# Patient Record
Sex: MALE | Race: WHITE | NOT HISPANIC OR LATINO | Employment: OTHER | URBAN - NONMETROPOLITAN AREA
[De-identification: names, ages, dates, MRNs, and addresses within clinical notes are randomized per-mention and may not be internally consistent; named-entity substitution may affect disease eponyms.]

---

## 2018-11-16 ENCOUNTER — OFFICE VISIT (OUTPATIENT)
Dept: SURGERY | Facility: CLINIC | Age: 63
End: 2018-11-16

## 2018-11-16 VITALS
WEIGHT: 185 LBS | SYSTOLIC BLOOD PRESSURE: 140 MMHG | TEMPERATURE: 98.9 F | OXYGEN SATURATION: 99 % | BODY MASS INDEX: 25.9 KG/M2 | HEIGHT: 71 IN | HEART RATE: 67 BPM | DIASTOLIC BLOOD PRESSURE: 80 MMHG

## 2018-11-16 DIAGNOSIS — Z12.11 SCREENING FOR COLON CANCER: ICD-10-CM

## 2018-11-16 DIAGNOSIS — K86.1 CHRONIC PANCREATITIS, UNSPECIFIED PANCREATITIS TYPE (HCC): Primary | ICD-10-CM

## 2018-11-16 DIAGNOSIS — R10.11 RIGHT UPPER QUADRANT ABDOMINAL PAIN: ICD-10-CM

## 2018-11-16 PROCEDURE — 99204 OFFICE O/P NEW MOD 45 MIN: CPT | Performed by: SURGERY

## 2018-11-16 RX ORDER — TIZANIDINE 4 MG/1
4 TABLET ORAL EVERY 8 HOURS PRN
COMMUNITY
Start: 2018-11-01 | End: 2021-07-15 | Stop reason: HOSPADM

## 2018-11-16 RX ORDER — FLUTICASONE PROPIONATE 50 MCG
1 SPRAY, SUSPENSION (ML) NASAL DAILY PRN
COMMUNITY
Start: 2018-10-17

## 2018-11-16 RX ORDER — GABAPENTIN 600 MG/1
600 TABLET ORAL 3 TIMES DAILY
COMMUNITY
Start: 2018-10-16 | End: 2021-07-15 | Stop reason: HOSPADM

## 2018-11-16 RX ORDER — POLYETHYLENE GLYCOL 3350 17 G/17G
255 POWDER, FOR SOLUTION ORAL ONCE
Qty: 15 PACKET | Refills: 0 | Status: SHIPPED | OUTPATIENT
Start: 2018-11-16 | End: 2018-11-16

## 2018-11-16 RX ORDER — LISINOPRIL AND HYDROCHLOROTHIAZIDE 25; 20 MG/1; MG/1
1 TABLET ORAL DAILY
Status: ON HOLD | COMMUNITY
End: 2021-07-02

## 2018-11-16 RX ORDER — BISACODYL 5 MG/1
TABLET, DELAYED RELEASE ORAL
Qty: 4 TABLET | Refills: 0 | Status: ON HOLD | OUTPATIENT
Start: 2018-11-16 | End: 2021-07-02

## 2018-11-16 RX ORDER — CETIRIZINE HYDROCHLORIDE 10 MG/1
10 TABLET ORAL DAILY
Status: ON HOLD | COMMUNITY
End: 2021-07-02

## 2018-11-16 RX ORDER — HUMAN INSULIN 100 [IU]/ML
28-30 INJECTION, SUSPENSION SUBCUTANEOUS TAKE AS DIRECTED
Status: ON HOLD | COMMUNITY
Start: 2018-10-17 | End: 2021-07-02

## 2018-11-16 NOTE — PROGRESS NOTES
Patient: Chance Lopez    YOB: 1955    Date: 11/16/2018    Primary Care Provider: Ramos Gurrola MD    Chief Complaint   Patient presents with   • Abdominal Pain       Subjective .     History of present illness:  Patient is here and complains of recent upper abdominal pain, that comes and goes for the last six weeks. He also complains of weight loss and diarrhea. Patient state a history of Pancreatitis. He denies any recent lab work.  Patient has a history of alcohol abuse but he quit 14 years ago.  His last severe attack of pancreatitis with over 20 years ago, required hospitalization at .  Patient complains of right upper quadrant pain, associated with food, bloating and pressure.  No significant heartburn symptoms.  There is some fatty food intolerance.  No previous colonoscopy and no family history of colon cancer.    The following portions of the patient's history were reviewed and updated as appropriate: allergies, current medications, past family history, past medical history, past social history, past surgical history and problem list.       Review of Systems   Constitutional: Negative for chills, fever and unexpected weight change.   HENT: Negative for trouble swallowing and voice change.    Eyes: Negative for visual disturbance.   Respiratory: Negative for apnea, cough, chest tightness, shortness of breath and wheezing.    Cardiovascular: Negative for chest pain, palpitations and leg swelling.   Gastrointestinal: Positive for abdominal pain and diarrhea. Negative for abdominal distention, anal bleeding, blood in stool, constipation, nausea, rectal pain and vomiting.   Endocrine: Negative for cold intolerance and heat intolerance.   Genitourinary: Negative for difficulty urinating, dysuria, flank pain, scrotal swelling and testicular pain.   Musculoskeletal: Negative for back pain, gait problem and joint swelling.   Skin: Negative for color change, rash and wound.   Neurological: Negative  "for dizziness, syncope, speech difficulty, weakness, numbness and headaches.   Hematological: Negative for adenopathy. Does not bruise/bleed easily.   Psychiatric/Behavioral: Negative for confusion. The patient is not nervous/anxious.        Allergies:  No Known Allergies    Medications:    Current Outpatient Medications:   •  cetirizine (zyrTEC) 10 MG tablet, Take 10 mg by mouth Daily., Disp: , Rfl:   •  lisinopril-hydrochlorothiazide (PRINZIDE,ZESTORETIC) 20-25 MG per tablet, Take 1 tablet by mouth Daily., Disp: , Rfl:   •  fluticasone (FLONASE) 50 MCG/ACT nasal spray, , Disp: , Rfl:   •  gabapentin (NEURONTIN) 600 MG tablet, , Disp: , Rfl:   •  NOVOLIN 70/30 RELION (70-30) 100 UNIT/ML injection, , Disp: , Rfl:   •  tiZANidine (ZANAFLEX) 4 MG tablet, , Disp: , Rfl:     History\"  Past Medical History:   Diagnosis Date   • Diabetes mellitus (CMS/[facility])        Past Surgical History:   Procedure Laterality Date   • NO PAST SURGERIES         Family History   Problem Relation Age of Onset   • Diabetes Mother    • Diabetes Father        Social History     Tobacco Use   • Smoking status: Never Smoker   • Smokeless tobacco: Current User     Types: Chew   Substance Use Topics   • Alcohol use: No     Frequency: Never   • Drug use: No        Objective     Vital Signs:   Vitals:    11/16/18 1303   BP: 140/80   Pulse: 67   Temp: 98.9 °F (37.2 °C)   SpO2: 99%   Weight: 83.9 kg (185 lb)   Height: 180.3 cm (71\")       Physical Exam:   General Appearance:    Alert, cooperative, in no acute distress   Head:    Normocephalic, without obvious abnormality, atraumatic   Eyes:            Lids and lashes normal, conjunctivae and sclerae normal, no   icterus, no pallor, corneas clear, PERRLA   Ears:    Ears appear intact with no abnormalities noted   Throat:   No oral lesions, no thrush, oral mucosa moist   Neck:   No adenopathy, supple, trachea midline, no thyromegaly, no   carotid bruit, no JVD   Lungs:     Clear to " auscultation,respirations regular, even and                  unlabored    Heart:    Regular rhythm and normal rate, normal S1 and S2, no            murmur, no gallop, no rub, no click   Chest Wall:    No abnormalities observed   Abdomen:     Normal bowel sounds, no masses, no organomegaly, soft        non-tender, non-distended, no guarding, tender right upper quadrant    Extremities:   Moves all extremities well, no edema, no cyanosis, no             redness   Pulses:   Pulses palpable and equal bilaterally   Skin:   No bleeding, bruising or rash   Lymph nodes:   No palpable adenopathy   Neurologic:   Cranial nerves 2 - 12 grossly intact, sensation intact, DTR       present and equal bilaterally     Results Review:   I reviewed the patient's new clinical results.    Assessment/Plan     1. Chronic pancreatitis, unspecified pancreatitis type (CMS/HCC)    2. Screening for colon cancer    3. Right upper quadrant abdominal pain        Gallbladder ultrasound today.  Patient will need EGD and colonoscopy to evaluate possible ulcer disease or gastritis.  Also will need routine colon screening.  If gallbladder ultrasound negative, patient will also need a HIDA scan to rule out gallbladder disease.    I discussed the patients findings and my recommendations with patient    Review of Systems was reviewed and confirmed as accurate today.    Electronically signed by Ha Sykes MD  11/16/18      .

## 2018-11-16 NOTE — H&P (VIEW-ONLY)
Patient: Chance Lopez    YOB: 1955    Date: 11/16/2018    Primary Care Provider: Ramos Gurrola MD    Chief Complaint   Patient presents with   • Abdominal Pain       Subjective .     History of present illness:  Patient is here and complains of recent upper abdominal pain, that comes and goes for the last six weeks. He also complains of weight loss and diarrhea. Patient state a history of Pancreatitis. He denies any recent lab work.  Patient has a history of alcohol abuse but he quit 14 years ago.  His last severe attack of pancreatitis with over 20 years ago, required hospitalization at .  Patient complains of right upper quadrant pain, associated with food, bloating and pressure.  No significant heartburn symptoms.  There is some fatty food intolerance.  No previous colonoscopy and no family history of colon cancer.    The following portions of the patient's history were reviewed and updated as appropriate: allergies, current medications, past family history, past medical history, past social history, past surgical history and problem list.       Review of Systems   Constitutional: Negative for chills, fever and unexpected weight change.   HENT: Negative for trouble swallowing and voice change.    Eyes: Negative for visual disturbance.   Respiratory: Negative for apnea, cough, chest tightness, shortness of breath and wheezing.    Cardiovascular: Negative for chest pain, palpitations and leg swelling.   Gastrointestinal: Positive for abdominal pain and diarrhea. Negative for abdominal distention, anal bleeding, blood in stool, constipation, nausea, rectal pain and vomiting.   Endocrine: Negative for cold intolerance and heat intolerance.   Genitourinary: Negative for difficulty urinating, dysuria, flank pain, scrotal swelling and testicular pain.   Musculoskeletal: Negative for back pain, gait problem and joint swelling.   Skin: Negative for color change, rash and wound.   Neurological: Negative  "for dizziness, syncope, speech difficulty, weakness, numbness and headaches.   Hematological: Negative for adenopathy. Does not bruise/bleed easily.   Psychiatric/Behavioral: Negative for confusion. The patient is not nervous/anxious.        Allergies:  No Known Allergies    Medications:    Current Outpatient Medications:   •  cetirizine (zyrTEC) 10 MG tablet, Take 10 mg by mouth Daily., Disp: , Rfl:   •  lisinopril-hydrochlorothiazide (PRINZIDE,ZESTORETIC) 20-25 MG per tablet, Take 1 tablet by mouth Daily., Disp: , Rfl:   •  fluticasone (FLONASE) 50 MCG/ACT nasal spray, , Disp: , Rfl:   •  gabapentin (NEURONTIN) 600 MG tablet, , Disp: , Rfl:   •  NOVOLIN 70/30 RELION (70-30) 100 UNIT/ML injection, , Disp: , Rfl:   •  tiZANidine (ZANAFLEX) 4 MG tablet, , Disp: , Rfl:     History\"  Past Medical History:   Diagnosis Date   • Diabetes mellitus (CMS/[facility])        Past Surgical History:   Procedure Laterality Date   • NO PAST SURGERIES         Family History   Problem Relation Age of Onset   • Diabetes Mother    • Diabetes Father        Social History     Tobacco Use   • Smoking status: Never Smoker   • Smokeless tobacco: Current User     Types: Chew   Substance Use Topics   • Alcohol use: No     Frequency: Never   • Drug use: No        Objective     Vital Signs:   Vitals:    11/16/18 1303   BP: 140/80   Pulse: 67   Temp: 98.9 °F (37.2 °C)   SpO2: 99%   Weight: 83.9 kg (185 lb)   Height: 180.3 cm (71\")       Physical Exam:   General Appearance:    Alert, cooperative, in no acute distress   Head:    Normocephalic, without obvious abnormality, atraumatic   Eyes:            Lids and lashes normal, conjunctivae and sclerae normal, no   icterus, no pallor, corneas clear, PERRLA   Ears:    Ears appear intact with no abnormalities noted   Throat:   No oral lesions, no thrush, oral mucosa moist   Neck:   No adenopathy, supple, trachea midline, no thyromegaly, no   carotid bruit, no JVD   Lungs:     Clear to " auscultation,respirations regular, even and                  unlabored    Heart:    Regular rhythm and normal rate, normal S1 and S2, no            murmur, no gallop, no rub, no click   Chest Wall:    No abnormalities observed   Abdomen:     Normal bowel sounds, no masses, no organomegaly, soft        non-tender, non-distended, no guarding, tender right upper quadrant    Extremities:   Moves all extremities well, no edema, no cyanosis, no             redness   Pulses:   Pulses palpable and equal bilaterally   Skin:   No bleeding, bruising or rash   Lymph nodes:   No palpable adenopathy   Neurologic:   Cranial nerves 2 - 12 grossly intact, sensation intact, DTR       present and equal bilaterally     Results Review:   I reviewed the patient's new clinical results.    Assessment/Plan     1. Chronic pancreatitis, unspecified pancreatitis type (CMS/HCC)    2. Screening for colon cancer    3. Right upper quadrant abdominal pain        Gallbladder ultrasound today.  Patient will need EGD and colonoscopy to evaluate possible ulcer disease or gastritis.  Also will need routine colon screening.  If gallbladder ultrasound negative, patient will also need a HIDA scan to rule out gallbladder disease.    I discussed the patients findings and my recommendations with patient    Review of Systems was reviewed and confirmed as accurate today.    Electronically signed by Ha Sykes MD  11/16/18      .

## 2018-11-20 ENCOUNTER — PREP FOR SURGERY (OUTPATIENT)
Dept: OTHER | Facility: HOSPITAL | Age: 63
End: 2018-11-20

## 2018-11-20 DIAGNOSIS — Z12.11 COLON CANCER SCREENING: Primary | ICD-10-CM

## 2018-11-20 DIAGNOSIS — K29.70 GASTRITIS, PRESENCE OF BLEEDING UNSPECIFIED, UNSPECIFIED CHRONICITY, UNSPECIFIED GASTRITIS TYPE: ICD-10-CM

## 2018-11-21 ENCOUNTER — TELEPHONE (OUTPATIENT)
Dept: SURGERY | Facility: CLINIC | Age: 63
End: 2018-11-21

## 2018-11-26 ENCOUNTER — ANESTHESIA EVENT (OUTPATIENT)
Dept: GASTROENTEROLOGY | Facility: HOSPITAL | Age: 63
End: 2018-11-26

## 2018-11-26 ENCOUNTER — ANESTHESIA (OUTPATIENT)
Dept: GASTROENTEROLOGY | Facility: HOSPITAL | Age: 63
End: 2018-11-26

## 2018-11-26 ENCOUNTER — HOSPITAL ENCOUNTER (OUTPATIENT)
Facility: HOSPITAL | Age: 63
Setting detail: HOSPITAL OUTPATIENT SURGERY
Discharge: HOME | End: 2018-11-26
Attending: SURGERY | Admitting: SURGERY

## 2018-11-26 VITALS
RESPIRATION RATE: 18 BRPM | OXYGEN SATURATION: 96 % | DIASTOLIC BLOOD PRESSURE: 76 MMHG | HEART RATE: 55 BPM | BODY MASS INDEX: 25.9 KG/M2 | SYSTOLIC BLOOD PRESSURE: 155 MMHG | TEMPERATURE: 98.4 F | WEIGHT: 185 LBS | HEIGHT: 71 IN

## 2018-11-26 DIAGNOSIS — K29.70 GASTRITIS, PRESENCE OF BLEEDING UNSPECIFIED, UNSPECIFIED CHRONICITY, UNSPECIFIED GASTRITIS TYPE: ICD-10-CM

## 2018-11-26 DIAGNOSIS — Z12.11 COLON CANCER SCREENING: ICD-10-CM

## 2018-11-26 LAB — GLUCOSE BLDC GLUCOMTR-MCNC: 276 MG/DL (ref 70–130)

## 2018-11-26 PROCEDURE — 82962 GLUCOSE BLOOD TEST: CPT

## 2018-11-26 PROCEDURE — 25010000002 ONDANSETRON PER 1 MG: Performed by: NURSE ANESTHETIST, CERTIFIED REGISTERED

## 2018-11-26 PROCEDURE — 25010000002 PROPOFOL 200 MG/20ML EMULSION: Performed by: NURSE ANESTHETIST, CERTIFIED REGISTERED

## 2018-11-26 RX ORDER — ONDANSETRON 2 MG/ML
INJECTION INTRAMUSCULAR; INTRAVENOUS AS NEEDED
Status: DISCONTINUED | OUTPATIENT
Start: 2018-11-26 | End: 2018-11-26 | Stop reason: SURG

## 2018-11-26 RX ORDER — PROPOFOL 10 MG/ML
INJECTION, EMULSION INTRAVENOUS AS NEEDED
Status: DISCONTINUED | OUTPATIENT
Start: 2018-11-26 | End: 2018-11-26 | Stop reason: SURG

## 2018-11-26 RX ORDER — SODIUM CHLORIDE, SODIUM LACTATE, POTASSIUM CHLORIDE, CALCIUM CHLORIDE 600; 310; 30; 20 MG/100ML; MG/100ML; MG/100ML; MG/100ML
1000 INJECTION, SOLUTION INTRAVENOUS CONTINUOUS
Status: DISCONTINUED | OUTPATIENT
Start: 2018-11-26 | End: 2018-11-26 | Stop reason: HOSPADM

## 2018-11-26 RX ORDER — ONDANSETRON 2 MG/ML
4 INJECTION INTRAMUSCULAR; INTRAVENOUS ONCE AS NEEDED
Status: DISCONTINUED | OUTPATIENT
Start: 2018-11-26 | End: 2018-11-26 | Stop reason: HOSPADM

## 2018-11-26 RX ADMIN — PROPOFOL 50 MG: 10 INJECTION, EMULSION INTRAVENOUS at 12:10

## 2018-11-26 RX ADMIN — PROPOFOL 50 MG: 10 INJECTION, EMULSION INTRAVENOUS at 12:00

## 2018-11-26 RX ADMIN — PROPOFOL 50 MG: 10 INJECTION, EMULSION INTRAVENOUS at 12:02

## 2018-11-26 RX ADMIN — PROPOFOL 50 MG: 10 INJECTION, EMULSION INTRAVENOUS at 12:03

## 2018-11-26 RX ADMIN — SODIUM CHLORIDE, POTASSIUM CHLORIDE, SODIUM LACTATE AND CALCIUM CHLORIDE 1000 ML: 600; 310; 30; 20 INJECTION, SOLUTION INTRAVENOUS at 10:04

## 2018-11-26 RX ADMIN — PROPOFOL 50 MG: 10 INJECTION, EMULSION INTRAVENOUS at 12:07

## 2018-11-26 RX ADMIN — ONDANSETRON 4 MG: 2 INJECTION INTRAMUSCULAR; INTRAVENOUS at 12:02

## 2018-11-26 RX ADMIN — PROPOFOL 50 MG: 10 INJECTION, EMULSION INTRAVENOUS at 11:56

## 2018-11-26 RX ADMIN — PROPOFOL 50 MG: 10 INJECTION, EMULSION INTRAVENOUS at 11:58

## 2018-11-26 NOTE — ANESTHESIA PREPROCEDURE EVALUATION
Anesthesia Evaluation     Patient summary reviewed and Nursing notes reviewed   no history of anesthetic complications:  NPO Solid Status: > 8 hours  NPO Liquid Status: > 8 hours           Airway   Dental      Pulmonary    (-) not a smoker  Cardiovascular     (+) hypertension,       Neuro/Psych  GI/Hepatic/Renal/Endo    (+)   hepatitis A, liver disease, diabetes mellitus type 2 poorly controlled using insulin,     Musculoskeletal     (+) arthralgias, back pain, chronic pain,   Abdominal    Substance History   (+) alcohol use,      OB/GYN          Other                      Anesthesia Plan    ASA 3     MAC   (Risks and benefits discussed including risk of aspiration, recall and dental damage. All patient questions answered. Will continue with POC.)  intravenous induction   Anesthetic plan, all risks, benefits, and alternatives have been provided, discussed and informed consent has been obtained with: patient.

## 2018-11-26 NOTE — DISCHARGE INSTRUCTIONS
Please follow all post op instructions and follow up appointment time from your physician's office included in your discharge packet.    No pushing, pulling, tugging,  heavy lifting, or strenuous activity.  No major decision making, driving, or drinking alcoholic beverages for 24 hours. ( due to the medications you have  received)  Always use good hand hygiene/washing techniques.  NO driving while taking pain medications.    To assist you in voiding:  Drink plenty of fluids  Listen to running water while attempting to void.    If you are unable to urinate and you have an uncomfortable urge to void or it has been   6 hours since you were discharged, return to the Emergency Room

## 2018-11-26 NOTE — ANESTHESIA POSTPROCEDURE EVALUATION
Patient: Chance Lopez    Procedure Summary     Date:  11/26/18 Room / Location:  [facility] ENDOSCOPY 3 / [facility] ENDOSCOPY    Anesthesia Start:  1154 Anesthesia Stop:  1214    Procedures:       ESOPHAGOGASTRODUODENOSCOPY with cold forcep biopsy (N/A Esophagus)      COLONOSCOPY (N/A ) Diagnosis:       Colon cancer screening      Gastritis, presence of bleeding unspecified, unspecified chronicity, unspecified gastritis type      (Colon cancer screening [Z12.11])      (Gastritis, presence of bleeding unspecified, unspecified chronicity, unspecified gastritis type [K29.70])    Surgeon:  Ha Sykes MD Provider:  Harpreet Sigala CRNA    Anesthesia Type:  MAC ASA Status:  3          Anesthesia Type: MAC  Last vitals  BP   155/76 (11/26/18 1249)   Temp   98.4 °F (36.9 °C) (11/26/18 1219)   Pulse   55 (11/26/18 1249)   Resp   18 (11/26/18 1249)     SpO2   96 % (11/26/18 1249)     Post Anesthesia Care and Evaluation    Patient location during evaluation: bedside  Patient participation: complete - patient participated  Level of consciousness: awake  Pain score: 0  Pain management: adequate  Airway patency: patent  Anesthetic complications: No anesthetic complications  PONV Status: controlled  Cardiovascular status: acceptable and stable  Respiratory status: acceptable and room air  Hydration status: acceptable

## 2018-11-28 LAB
LAB AP CASE REPORT: NORMAL
PATH REPORT.FINAL DX SPEC: NORMAL

## 2018-12-04 ENCOUNTER — HOSPITAL ENCOUNTER (OUTPATIENT)
Dept: NUCLEAR MEDICINE | Facility: HOSPITAL | Age: 63
Discharge: HOME | End: 2018-12-04
Attending: SURGERY

## 2018-12-04 DIAGNOSIS — K86.1 CHRONIC PANCREATITIS, UNSPECIFIED PANCREATITIS TYPE (HCC): ICD-10-CM

## 2018-12-04 DIAGNOSIS — R10.11 RIGHT UPPER QUADRANT ABDOMINAL PAIN: ICD-10-CM

## 2018-12-04 PROCEDURE — A9500 TC99M SESTAMIBI: HCPCS | Performed by: SURGERY

## 2018-12-04 PROCEDURE — 0 TECHNETIUM TC 99M MEBROFENIN KIT: Performed by: SURGERY

## 2018-12-04 PROCEDURE — 25010000002 SINCALIDE PER 5 MCG: Performed by: SURGERY

## 2018-12-04 PROCEDURE — A9537 TC99M MEBROFENIN: HCPCS | Performed by: SURGERY

## 2018-12-04 PROCEDURE — 0 TECHNETIUM SESTAMIBI: Performed by: SURGERY

## 2018-12-04 PROCEDURE — 78227 HEPATOBIL SYST IMAGE W/DRUG: CPT

## 2018-12-04 RX ORDER — KIT FOR THE PREPARATION OF TECHNETIUM TC 99M MEBROFENIN 45 MG/10ML
1 INJECTION, POWDER, LYOPHILIZED, FOR SOLUTION INTRAVENOUS
Status: COMPLETED | OUTPATIENT
Start: 2018-12-04 | End: 2018-12-04

## 2018-12-04 RX ADMIN — SINCALIDE 1.7 MCG: 5 INJECTION, POWDER, LYOPHILIZED, FOR SOLUTION INTRAVENOUS at 13:23

## 2018-12-04 RX ADMIN — MEBROFENIN 1 DOSE: 45 INJECTION, POWDER, LYOPHILIZED, FOR SOLUTION INTRAVENOUS at 12:20

## 2018-12-14 ENCOUNTER — OFFICE VISIT (OUTPATIENT)
Dept: SURGERY | Facility: CLINIC | Age: 63
End: 2018-12-14

## 2018-12-14 VITALS
BODY MASS INDEX: 25.9 KG/M2 | HEIGHT: 71 IN | DIASTOLIC BLOOD PRESSURE: 78 MMHG | HEART RATE: 77 BPM | SYSTOLIC BLOOD PRESSURE: 136 MMHG | WEIGHT: 184.97 LBS | TEMPERATURE: 98.7 F | OXYGEN SATURATION: 98 %

## 2018-12-14 DIAGNOSIS — K29.60 GASTRITIS DUE TO HELICOBACTER HEILMANNII: ICD-10-CM

## 2018-12-14 DIAGNOSIS — R10.11 RIGHT UPPER QUADRANT ABDOMINAL PAIN: Primary | ICD-10-CM

## 2018-12-14 DIAGNOSIS — K21.00 GASTROESOPHAGEAL REFLUX DISEASE WITH ESOPHAGITIS: ICD-10-CM

## 2018-12-14 DIAGNOSIS — B96.89 GASTRITIS DUE TO HELICOBACTER HEILMANNII: ICD-10-CM

## 2018-12-14 PROCEDURE — 99213 OFFICE O/P EST LOW 20 MIN: CPT | Performed by: SURGERY

## 2018-12-14 RX ORDER — LANSOPRAZOLE, AMOXICILLIN, CLARITHROMYCIN 30-500-500
KIT ORAL 2 TIMES DAILY
Qty: 14 KIT | Refills: 0 | Status: ON HOLD | OUTPATIENT
Start: 2018-12-14 | End: 2021-07-02

## 2018-12-14 NOTE — PROGRESS NOTES
Patient: Chance Lopez    YOB: 1955    Date: 12/14/2018    Primary Care Provider: Ramos Gurrola MD    Chief Complaint   Patient presents with   • Follow-up     EGD and HIDA       Subjective .     History of present illness:  I saw the patient in the office today as a consultation for evaluation and treatment of abdominal pain. He had a recent EGD with biopsy, the pathology report did show Helicobacter pylori gastritis and chronic active gastritis. He also had a recent HIDA scan that did show 43.4%. He complains of sharp upper abdominal pain, nothing seems to make it worse or better. He complains of associated diarrhea. He denies nausea and vomiting.     The following portions of the patient's history were reviewed and updated as appropriate: allergies, current medications, past family history, past medical history, past social history, past surgical history and problem list.    Review of Systems   Constitutional: Negative for chills, fever and unexpected weight change.   HENT: Negative for trouble swallowing and voice change.    Eyes: Negative for visual disturbance.   Respiratory: Negative for apnea, cough, chest tightness, shortness of breath and wheezing.    Cardiovascular: Negative for chest pain, palpitations and leg swelling.   Gastrointestinal: Positive for abdominal pain and diarrhea. Negative for abdominal distention, anal bleeding, blood in stool, constipation, nausea, rectal pain and vomiting.   Endocrine: Negative for cold intolerance and heat intolerance.   Genitourinary: Negative for difficulty urinating, dysuria, flank pain, scrotal swelling and testicular pain.   Musculoskeletal: Negative for back pain, gait problem and joint swelling.   Skin: Negative for color change, rash and wound.   Neurological: Negative for dizziness, syncope, speech difficulty, weakness, numbness and headaches.   Hematological: Negative for adenopathy. Does not bruise/bleed easily.   Psychiatric/Behavioral:  Negative for confusion. The patient is not nervous/anxious.        History:  Past Medical History:   Diagnosis Date   • Diabetes mellitus (CMS/HCC)    • Diarrhea    • Full dentures     INSTRUCTED NO ADHESIVES THE DOS. REPORTS USUALLY ONLY WEARS UPPER PLATE.   • Hearing loss     REPORTS MORE LOSS ON LEFT SIDE BUT THAT HE HAS BILATERAL LOSS. NO USE OF HEARING AIDS.   • Hepatitis     REPORTS AS A CHILD.  UNSURE IF TYPE A OR B.   • History of acute pancreatitis     REPORTS PRIOR TO 2000   • History of gout    • Hypertension    • Seasonal allergies    • Wears glasses     RX GLASSES       Past Surgical History:   Procedure Laterality Date   • TOOTH EXTRACTION         Family History   Problem Relation Age of Onset   • Diabetes Mother    • Diabetes Father        Social History     Tobacco Use   • Smoking status: Never Smoker   • Smokeless tobacco: Current User     Types: Chew   • Tobacco comment: REPORTS HE HAS CHEWED TOBACCO FOR 45 YEARS   Substance Use Topics   • Alcohol use: Yes     Frequency: Never     Comment: PATIENT REPORTS NO ETOH FOR 14 YEARS. REPORTS NO HX OF ABUSE   • Drug use: No       Allergies:  No Known Allergies    Medications:    Current Outpatient Medications:   •  bisacodyl (DULCOLAX) 5 MG EC tablet, As directed/colon prep (Patient taking differently: Take 5 mg by mouth Take As Directed. As directed/colon prep), Disp: 4 tablet, Rfl: 0  •  cetirizine (zyrTEC) 10 MG tablet, Take 10 mg by mouth Daily., Disp: , Rfl:   •  fluticasone (FLONASE) 50 MCG/ACT nasal spray, 1 spray into the nostril(s) as directed by provider Daily As Needed for Rhinitis or Allergies., Disp: , Rfl:   •  gabapentin (NEURONTIN) 600 MG tablet, Take 600 mg by mouth 3 (Three) Times a Day As Needed., Disp: , Rfl:   •  lisinopril-hydrochlorothiazide (PRINZIDE,ZESTORETIC) 20-25 MG per tablet, Take 1 tablet by mouth Daily., Disp: , Rfl:   •  NOVOLIN 70/30 RELION (70-30) 100 UNIT/ML injection, Inject 28-30 Units under the skin into the  "appropriate area as directed Take As Directed. TAKE 30 UNITS EVERY MORNING AND 28 UNITS EVERY NIGHT, Disp: , Rfl:   •  tiZANidine (ZANAFLEX) 4 MG tablet, Take 4 mg by mouth 2 (Two) Times a Day., Disp: , Rfl:     Objective     Vital Signs:   Vitals:    12/14/18 1421   BP: 136/78   Pulse: 77   Temp: 98.7 °F (37.1 °C)   SpO2: 98%   Weight: 83.9 kg (184 lb 15.5 oz)   Height: 180.3 cm (70.98\")       Physical Exam:   General Appearance:    Alert, cooperative, in no acute distress   Head:    Normocephalic, without obvious abnormality, atraumatic   Eyes:            Lids and lashes normal, conjunctivae and sclerae normal, no   icterus, no pallor, corneas clear, PERRLA   Ears:    Ears appear intact with no abnormalities noted   Throat:   No oral lesions, no thrush, oral mucosa moist   Neck:   No adenopathy, supple, trachea midline, no thyromegaly, no   carotid bruit, no JVD   Lungs:     Clear to auscultation,respirations regular, even and                  unlabored    Heart:    Regular rhythm and normal rate, normal S1 and S2, no            murmur   Abdomen:     no masses, no organomegaly, soft non-tender, non-distended, no guarding, there is evidence of right upper quadrant tenderness   Extremities:   Moves all extremities well, no edema, no cyanosis, no             redness   Pulses:   Pulses palpable and equal bilaterally   Skin:   No bleeding, bruising or rash   Lymph nodes:   No palpable adenopathy   Neurologic:   Cranial nerves 2 - 12 grossly intact, sensation intact      Results Review:   I reviewed the patient's new clinical results.    Review of Systems was reviewed and confirmed as accurate today.    Assessment/Plan :    1. Right upper quadrant abdominal pain    2. Gastroesophageal reflux disease with esophagitis    3. Gastritis due to Helicobacter heilmannii      Patient will be started on Prevpac for 14 days, follow-up in 3 weeks.  If no change abdominal pain, with consider laparoscopic cholecystectomy at that " time.     I discussed the patients findings and my recommendations with patient.    Electronically signed by Ha Sykes MD  12/14/18    Portions of this note has been scribed for Ha Sykes MD by Renita Forde. 12/14/2018  2:49 PM

## 2019-01-04 ENCOUNTER — OFFICE VISIT (OUTPATIENT)
Dept: SURGERY | Facility: CLINIC | Age: 64
End: 2019-01-04

## 2019-01-04 VITALS
WEIGHT: 184.2 LBS | OXYGEN SATURATION: 99 % | DIASTOLIC BLOOD PRESSURE: 70 MMHG | SYSTOLIC BLOOD PRESSURE: 130 MMHG | BODY MASS INDEX: 25.79 KG/M2 | HEIGHT: 71 IN | HEART RATE: 60 BPM | TEMPERATURE: 98.4 F

## 2019-01-04 DIAGNOSIS — K29.60 GASTRITIS DUE TO HELICOBACTER HEILMANNII: ICD-10-CM

## 2019-01-04 DIAGNOSIS — K82.8 BILIARY DYSKINESIA: Primary | ICD-10-CM

## 2019-01-04 DIAGNOSIS — B96.89 GASTRITIS DUE TO HELICOBACTER HEILMANNII: ICD-10-CM

## 2019-01-04 PROCEDURE — 99212 OFFICE O/P EST SF 10 MIN: CPT | Performed by: SURGERY

## 2019-01-04 NOTE — PROGRESS NOTES
Patient: Chance Lopez    YOB: 1955    Date: 01/04/2019    Primary Care Provider: Ramos Gurrola MD    Chief Complaint   Patient presents with   • Follow-up     F/U UPPER ABDOMINAL PAIN       History:I saw the patient in the office today to follow up on nausea.  Patient had an EGD done that showed Helicobacter, patient has finished a Prevpak.  He had a hida scan EF 43.4%.  Patient states his diarrhea and pain @ upper abdominal area has improved.  He still  has soreness @ RUQ.  Patient does not have severe right upper quadrant pain after eating, no nausea or bloating.    The following portions of the patient's history were reviewed and updated as appropriate: allergies, current medications, past family history, past medical history, past social history, past surgical history and problem list.      Review of Systems   Constitutional: Negative for chills, fever and unexpected weight change.   HENT: Negative for trouble swallowing and voice change.    Eyes: Negative for visual disturbance.   Respiratory: Negative for apnea, cough, chest tightness, shortness of breath and wheezing.    Cardiovascular: Negative for chest pain, palpitations and leg swelling.   Gastrointestinal: Negative for abdominal distention, abdominal pain, anal bleeding, blood in stool, constipation, diarrhea, nausea, rectal pain and vomiting.   Endocrine: Negative for cold intolerance and heat intolerance.   Genitourinary: Negative for difficulty urinating, dysuria, flank pain, scrotal swelling and testicular pain.   Musculoskeletal: Negative for back pain, gait problem and joint swelling.   Skin: Negative for color change, rash and wound.   Neurological: Negative for dizziness, syncope, speech difficulty, weakness, numbness and headaches.   Hematological: Negative for adenopathy. Does not bruise/bleed easily.   Psychiatric/Behavioral: Negative for confusion. The patient is not nervous/anxious.        Vital Signs  Vitals:    01/04/19  "1225   BP: 130/70   Pulse: 60   Temp: 98.4 °F (36.9 °C)   SpO2: 99%   Weight: 83.6 kg (184 lb 3.2 oz)   Height: 180.3 cm (70.98\")       Allergies:  No Known Allergies    Medications:    Current Outpatient Medications:   •  amoxicillin-clarithromycin-lansoprazole (PREVPAC) combo pack, Take  by mouth 2 (Two) Times a Day. Follow package directions., Disp: 14 kit, Rfl: 0  •  bisacodyl (DULCOLAX) 5 MG EC tablet, As directed/colon prep (Patient taking differently: Take 5 mg by mouth Take As Directed. As directed/colon prep), Disp: 4 tablet, Rfl: 0  •  cetirizine (zyrTEC) 10 MG tablet, Take 10 mg by mouth Daily., Disp: , Rfl:   •  fluticasone (FLONASE) 50 MCG/ACT nasal spray, 1 spray into the nostril(s) as directed by provider Daily As Needed for Rhinitis or Allergies., Disp: , Rfl:   •  gabapentin (NEURONTIN) 600 MG tablet, Take 600 mg by mouth 3 (Three) Times a Day As Needed., Disp: , Rfl:   •  lisinopril-hydrochlorothiazide (PRINZIDE,ZESTORETIC) 20-25 MG per tablet, Take 1 tablet by mouth Daily., Disp: , Rfl:   •  NOVOLIN 70/30 RELION (70-30) 100 UNIT/ML injection, Inject 28-30 Units under the skin into the appropriate area as directed Take As Directed. TAKE 30 UNITS EVERY MORNING AND 28 UNITS EVERY NIGHT, Disp: , Rfl:   •  tiZANidine (ZANAFLEX) 4 MG tablet, Take 4 mg by mouth 2 (Two) Times a Day., Disp: , Rfl:     Physical Exam:   General Appearance:    Alert, cooperative, in no acute distress   Head:    Normocephalic, without obvious abnormality, atraumatic   Lungs:     Clear to auscultation,respirations regular, even and                  unlabored    Heart:    Regular rhythm and normal rate, normal S1 and S2, no            murmur, no gallop, no rub, no click   Abdomen:     Normal bowel sounds, no masses, no organomegaly, soft        non-tender, non-distended, no guarding, no rebound                tenderness   Extremities:   Moves all extremities well, no edema, no cyanosis, no             redness   Pulses:   Pulses " palpable and equal bilaterally   Skin:   No bleeding, bruising or rash       Results Review:   I reviewed the patient's new clinical results.     Assessment/Plan     1. Biliary dyskinesia    2. Gastritis due to Helicobacter heilmannii      Continue a low-fat diet, also would recommend continue PPI medication for gastritis.  Follow-up if he has recurrent symptoms of right upper quadrant pain to reevaluate his gallbladder symptoms.  Otherwise will follow-up as needed.    Electronically signed by Ha Sykes MD  01/04/19    Portions of this note have been scribed for Ha Sykes MD by Elida Agustin. 1/4/2019  12:43 PM.

## 2021-02-25 DIAGNOSIS — Z23 IMMUNIZATION DUE: ICD-10-CM

## 2021-07-01 ENCOUNTER — HOSPITAL ENCOUNTER (INPATIENT)
Facility: HOSPITAL | Age: 66
LOS: 14 days | Discharge: NURSING, REHAB OR LONG-TERM CARE | End: 2021-07-15
Attending: FAMILY MEDICINE | Admitting: FAMILY MEDICINE

## 2021-07-01 PROBLEM — I63.9 CVA (CEREBRAL VASCULAR ACCIDENT): Status: ACTIVE | Noted: 2021-07-01

## 2021-07-01 LAB
GLUCOSE BLDC GLUCOMTR-MCNC: 156 MG/DL (ref 70–130)
GLUCOSE BLDC GLUCOMTR-MCNC: 239 MG/DL (ref 70–130)

## 2021-07-01 PROCEDURE — 63710000001 INSULIN DETEMIR PER 5 UNITS: Performed by: FAMILY MEDICINE

## 2021-07-01 PROCEDURE — 82962 GLUCOSE BLOOD TEST: CPT

## 2021-07-01 PROCEDURE — 25010000002 ENOXAPARIN PER 10 MG: Performed by: FAMILY MEDICINE

## 2021-07-01 PROCEDURE — 63710000001 INSULIN ASPART PER 5 UNITS: Performed by: FAMILY MEDICINE

## 2021-07-01 RX ORDER — DIPHENOXYLATE HYDROCHLORIDE AND ATROPINE SULFATE 2.5; .025 MG/1; MG/1
1 TABLET ORAL DAILY
Status: DISCONTINUED | OUTPATIENT
Start: 2021-07-01 | End: 2021-07-15 | Stop reason: HOSPADM

## 2021-07-01 RX ORDER — CETIRIZINE HYDROCHLORIDE 10 MG/1
10 TABLET ORAL DAILY
Status: DISCONTINUED | OUTPATIENT
Start: 2021-07-01 | End: 2021-07-15 | Stop reason: HOSPADM

## 2021-07-01 RX ORDER — OLANZAPINE 5 MG/1
5 TABLET ORAL NIGHTLY
Status: DISCONTINUED | OUTPATIENT
Start: 2021-07-01 | End: 2021-07-15 | Stop reason: HOSPADM

## 2021-07-01 RX ORDER — CLOPIDOGREL BISULFATE 75 MG/1
75 TABLET ORAL DAILY
Status: DISCONTINUED | OUTPATIENT
Start: 2021-07-01 | End: 2021-07-15 | Stop reason: HOSPADM

## 2021-07-01 RX ORDER — CARVEDILOL 6.25 MG/1
12.5 TABLET ORAL 2 TIMES DAILY WITH MEALS
Status: DISCONTINUED | OUTPATIENT
Start: 2021-07-01 | End: 2021-07-15 | Stop reason: HOSPADM

## 2021-07-01 RX ORDER — FLUTICASONE PROPIONATE 50 MCG
2 SPRAY, SUSPENSION (ML) NASAL DAILY
Status: DISCONTINUED | OUTPATIENT
Start: 2021-07-01 | End: 2021-07-15 | Stop reason: HOSPADM

## 2021-07-01 RX ORDER — ASPIRIN 81 MG/1
81 TABLET ORAL DAILY
Status: DISCONTINUED | OUTPATIENT
Start: 2021-07-01 | End: 2021-07-15 | Stop reason: HOSPADM

## 2021-07-01 RX ORDER — ATORVASTATIN CALCIUM 40 MG/1
40 TABLET, FILM COATED ORAL NIGHTLY
Status: DISCONTINUED | OUTPATIENT
Start: 2021-07-01 | End: 2021-07-15 | Stop reason: HOSPADM

## 2021-07-01 RX ORDER — PAROXETINE HYDROCHLORIDE 20 MG/1
20 TABLET, FILM COATED ORAL DAILY
Status: DISCONTINUED | OUTPATIENT
Start: 2021-07-01 | End: 2021-07-15 | Stop reason: HOSPADM

## 2021-07-01 RX ADMIN — ATORVASTATIN CALCIUM 40 MG: 40 TABLET, FILM COATED ORAL at 20:38

## 2021-07-01 RX ADMIN — INSULIN DETEMIR 8 UNITS: 100 INJECTION, SOLUTION SUBCUTANEOUS at 21:04

## 2021-07-01 RX ADMIN — ENOXAPARIN SODIUM 40 MG: 40 INJECTION SUBCUTANEOUS at 20:37

## 2021-07-01 RX ADMIN — OLANZAPINE 5 MG: 5 TABLET, FILM COATED ORAL at 20:38

## 2021-07-01 RX ADMIN — INSULIN ASPART 7 UNITS: 100 INJECTION, SOLUTION INTRAVENOUS; SUBCUTANEOUS at 18:47

## 2021-07-01 NOTE — PROGRESS NOTES
Patient Assessment Instrument  Quality Indicators - Admission    Section B. Hearing, Speech Vision  Expression of Ideas and Wants: Expresses complex messages without difficulty and  with speech that is clear and easy to understand.  Understanding Verbal and Non-Verbal Content: Understands: Clear comprehension  without cues or repetitions.    Section C. Cognitive Patterns      Section VR6995. Prior Functioning      Section PJ2236. Prior Device Use      Section OM3416. Self Care Performance      Section OY7531. Self Care Discharge Goals      Section WU6446. Mobility Performance      Section ET6642. Mobility Discharge Goals      Section H. Bladder and Bowel      Section I. Active Diagnosis      Section J. Health Conditions      Section K. Swallowing/Nutritional Status      Section M. Skin Conditions      Section N. Medication      Section O. Special Treatments, Procedures, and Programs      OPTIONAL BRANCH FOR TRACKING FALLS  Fall(s) During Shift:    Signed by: Nurse Sara

## 2021-07-01 NOTE — PROGRESS NOTES
Rehabilitation Nursing  Inpatient Rehabilitation Admission Assessment of Functional Measures  and Plan of Care    Plan of Care  Copy from POC    Functional Measures  RENETTA Eating:  RENETTA Grooming:  RENETTA Bathing:  RENETTA Upper Body Dressing:  RENETTA Lower Body Dressing:  RENETTA Toileting:    RENETTA Bladder Management  Level of Assistance:  Frequency/Number of Accidents (4 days prior to admission):  Frequency/Number of Accidents this Shift:    RENETTA Bowel Management  Level of Assistance:  Frequency/Number of Accidents (4 days prior to admission):  Frequency/Number of Accidents this Shift:    RENETTA Bed/Chair/Wheelchair Transfer:  RENETTA Toilet Transfer:  RENETTA Tub/Shower Transfer:    Previously Documented Mode of Locomotion at Discharge:  [facility] Expected Mode of Locomotion at Discharge:  RENETTA Walk/Wheelchair:  RENETTA Stairs:    RENTETA Comprehension:  RENETTA Expression:  RENETTA Social Interaction:  [facility] Problem Solving:  RENETTA Memory:    Discharge Functional Goals:    Signed by: Claudia Allred Nurse

## 2021-07-01 NOTE — PROGRESS NOTES
Rehabilitation Nursing  Inpatient Rehabilitation Plan of Care Note    Plan of Care  Copy from Suzette    Pain Management (Active)  Current Status (7/1/2021 5:54:00 PM): Potential for pain  Weekly Goal: No pain this week  Discharge Goal: No pain    Safety    Potential for Injury (Active)  Current Status (7/1/2021 5:54:00 PM): At risk for injury  Weekly Goal: No injury this week  Discharge Goal: No injury    Signed by: Lynne Martin Nurse

## 2021-07-02 LAB
ALBUMIN SERPL-MCNC: 3.44 G/DL (ref 3.5–5.2)
ALBUMIN/GLOB SERPL: 1.3 G/DL
ALP SERPL-CCNC: 90 U/L (ref 39–117)
ALT SERPL W P-5'-P-CCNC: 35 U/L (ref 1–41)
ANION GAP SERPL CALCULATED.3IONS-SCNC: 10 MMOL/L (ref 5–15)
AST SERPL-CCNC: 27 U/L (ref 1–40)
BASOPHILS # BLD AUTO: 0.04 10*3/MM3 (ref 0–0.2)
BASOPHILS NFR BLD AUTO: 0.8 % (ref 0–1.5)
BILIRUB SERPL-MCNC: 0.4 MG/DL (ref 0–1.2)
BUN SERPL-MCNC: 15 MG/DL (ref 8–23)
BUN/CREAT SERPL: 10.3 (ref 7–25)
CALCIUM SPEC-SCNC: 9.2 MG/DL (ref 8.6–10.5)
CHLORIDE SERPL-SCNC: 105 MMOL/L (ref 98–107)
CO2 SERPL-SCNC: 26 MMOL/L (ref 22–29)
CREAT SERPL-MCNC: 1.45 MG/DL (ref 0.76–1.27)
DEPRECATED RDW RBC AUTO: 49.8 FL (ref 37–54)
EOSINOPHIL # BLD AUTO: 0.19 10*3/MM3 (ref 0–0.4)
EOSINOPHIL NFR BLD AUTO: 3.7 % (ref 0.3–6.2)
ERYTHROCYTE [DISTWIDTH] IN BLOOD BY AUTOMATED COUNT: 13.5 % (ref 12.3–15.4)
GFR SERPL CREATININE-BSD FRML MDRD: 49 ML/MIN/1.73
GLOBULIN UR ELPH-MCNC: 2.7 GM/DL
GLUCOSE BLDC GLUCOMTR-MCNC: 147 MG/DL (ref 70–130)
GLUCOSE BLDC GLUCOMTR-MCNC: 188 MG/DL (ref 70–130)
GLUCOSE SERPL-MCNC: 180 MG/DL (ref 65–99)
HCT VFR BLD AUTO: 34.3 % (ref 37.5–51)
HGB BLD-MCNC: 11.3 G/DL (ref 13–17.7)
IMM GRANULOCYTES # BLD AUTO: 0.02 10*3/MM3 (ref 0–0.05)
IMM GRANULOCYTES NFR BLD AUTO: 0.4 % (ref 0–0.5)
LYMPHOCYTES # BLD AUTO: 1.33 10*3/MM3 (ref 0.7–3.1)
LYMPHOCYTES NFR BLD AUTO: 26 % (ref 19.6–45.3)
MAGNESIUM SERPL-MCNC: 1.6 MG/DL (ref 1.6–2.4)
MCH RBC QN AUTO: 32.8 PG (ref 26.6–33)
MCHC RBC AUTO-ENTMCNC: 32.9 G/DL (ref 31.5–35.7)
MCV RBC AUTO: 99.7 FL (ref 79–97)
MONOCYTES # BLD AUTO: 0.49 10*3/MM3 (ref 0.1–0.9)
MONOCYTES NFR BLD AUTO: 9.6 % (ref 5–12)
NEUTROPHILS NFR BLD AUTO: 3.05 10*3/MM3 (ref 1.7–7)
NEUTROPHILS NFR BLD AUTO: 59.5 % (ref 42.7–76)
NRBC BLD AUTO-RTO: 0 /100 WBC (ref 0–0.2)
PLATELET # BLD AUTO: 126 10*3/MM3 (ref 140–450)
PMV BLD AUTO: 12.1 FL (ref 6–12)
POTASSIUM SERPL-SCNC: 4.2 MMOL/L (ref 3.5–5.2)
PROT SERPL-MCNC: 6.1 G/DL (ref 6–8.5)
RBC # BLD AUTO: 3.44 10*6/MM3 (ref 4.14–5.8)
SODIUM SERPL-SCNC: 141 MMOL/L (ref 136–145)
WBC # BLD AUTO: 5.12 10*3/MM3 (ref 3.4–10.8)

## 2021-07-02 PROCEDURE — 25010000002 ENOXAPARIN PER 10 MG: Performed by: FAMILY MEDICINE

## 2021-07-02 PROCEDURE — 85025 COMPLETE CBC W/AUTO DIFF WBC: CPT | Performed by: FAMILY MEDICINE

## 2021-07-02 PROCEDURE — 82962 GLUCOSE BLOOD TEST: CPT

## 2021-07-02 PROCEDURE — 99223 1ST HOSP IP/OBS HIGH 75: CPT | Performed by: FAMILY MEDICINE

## 2021-07-02 PROCEDURE — 97535 SELF CARE MNGMENT TRAINING: CPT | Performed by: OCCUPATIONAL THERAPIST

## 2021-07-02 PROCEDURE — 63710000001 INSULIN ASPART PER 5 UNITS: Performed by: FAMILY MEDICINE

## 2021-07-02 PROCEDURE — 97530 THERAPEUTIC ACTIVITIES: CPT | Performed by: OCCUPATIONAL THERAPIST

## 2021-07-02 PROCEDURE — 97110 THERAPEUTIC EXERCISES: CPT

## 2021-07-02 PROCEDURE — 97116 GAIT TRAINING THERAPY: CPT

## 2021-07-02 PROCEDURE — 80053 COMPREHEN METABOLIC PANEL: CPT | Performed by: FAMILY MEDICINE

## 2021-07-02 PROCEDURE — 97166 OT EVAL MOD COMPLEX 45 MIN: CPT | Performed by: OCCUPATIONAL THERAPIST

## 2021-07-02 PROCEDURE — 97161 PT EVAL LOW COMPLEX 20 MIN: CPT

## 2021-07-02 PROCEDURE — 92610 EVALUATE SWALLOWING FUNCTION: CPT

## 2021-07-02 PROCEDURE — 83735 ASSAY OF MAGNESIUM: CPT | Performed by: FAMILY MEDICINE

## 2021-07-02 PROCEDURE — 25010000003 MAGNESIUM SULFATE 4 GM/100ML SOLUTION: Performed by: FAMILY MEDICINE

## 2021-07-02 PROCEDURE — 63710000001 INSULIN DETEMIR PER 5 UNITS: Performed by: FAMILY MEDICINE

## 2021-07-02 PROCEDURE — 92523 SPEECH SOUND LANG COMPREHEN: CPT

## 2021-07-02 RX ORDER — MAGNESIUM SULFATE HEPTAHYDRATE 40 MG/ML
2 INJECTION, SOLUTION INTRAVENOUS AS NEEDED
Status: DISCONTINUED | OUTPATIENT
Start: 2021-07-02 | End: 2021-07-15 | Stop reason: HOSPADM

## 2021-07-02 RX ORDER — LORATADINE 10 MG/1
10 TABLET ORAL DAILY PRN
COMMUNITY
End: 2021-07-15 | Stop reason: HOSPADM

## 2021-07-02 RX ORDER — MAGNESIUM SULFATE HEPTAHYDRATE 40 MG/ML
4 INJECTION, SOLUTION INTRAVENOUS AS NEEDED
Status: DISCONTINUED | OUTPATIENT
Start: 2021-07-02 | End: 2021-07-15 | Stop reason: HOSPADM

## 2021-07-02 RX ORDER — MAGNESIUM SULFATE HEPTAHYDRATE 40 MG/ML
4 INJECTION, SOLUTION INTRAVENOUS ONCE
Status: COMPLETED | OUTPATIENT
Start: 2021-07-02 | End: 2021-07-02

## 2021-07-02 RX ORDER — PRAVASTATIN SODIUM 10 MG
10 TABLET ORAL DAILY
COMMUNITY
End: 2021-07-15 | Stop reason: HOSPADM

## 2021-07-02 RX ORDER — HYDROCHLOROTHIAZIDE 25 MG/1
25 TABLET ORAL DAILY
COMMUNITY
End: 2021-07-15 | Stop reason: HOSPADM

## 2021-07-02 RX ADMIN — CETIRIZINE HYDROCHLORIDE 10 MG: 10 TABLET, FILM COATED ORAL at 08:23

## 2021-07-02 RX ADMIN — CLOPIDOGREL 75 MG: 75 TABLET, FILM COATED ORAL at 08:23

## 2021-07-02 RX ADMIN — ENOXAPARIN SODIUM 40 MG: 40 INJECTION SUBCUTANEOUS at 20:09

## 2021-07-02 RX ADMIN — INSULIN DETEMIR 8 UNITS: 100 INJECTION, SOLUTION SUBCUTANEOUS at 21:14

## 2021-07-02 RX ADMIN — CARVEDILOL 12.5 MG: 6.25 TABLET, FILM COATED ORAL at 08:23

## 2021-07-02 RX ADMIN — PAROXETINE HYDROCHLORIDE 20 MG: 20 TABLET, FILM COATED ORAL at 08:23

## 2021-07-02 RX ADMIN — CARVEDILOL 12.5 MG: 6.25 TABLET, FILM COATED ORAL at 17:14

## 2021-07-02 RX ADMIN — INSULIN ASPART 7 UNITS: 100 INJECTION, SOLUTION INTRAVENOUS; SUBCUTANEOUS at 17:13

## 2021-07-02 RX ADMIN — MAGNESIUM SULFATE HEPTAHYDRATE 4 G: 40 INJECTION, SOLUTION INTRAVENOUS at 05:41

## 2021-07-02 RX ADMIN — OLANZAPINE 5 MG: 5 TABLET, FILM COATED ORAL at 20:09

## 2021-07-02 RX ADMIN — ATORVASTATIN CALCIUM 40 MG: 40 TABLET, FILM COATED ORAL at 20:09

## 2021-07-02 RX ADMIN — FLUTICASONE PROPIONATE 2 SPRAY: 50 SPRAY, METERED NASAL at 08:23

## 2021-07-02 RX ADMIN — Medication 1 TABLET: at 08:23

## 2021-07-02 RX ADMIN — INSULIN ASPART 7 UNITS: 100 INJECTION, SOLUTION INTRAVENOUS; SUBCUTANEOUS at 08:23

## 2021-07-02 RX ADMIN — INSULIN ASPART 7 UNITS: 100 INJECTION, SOLUTION INTRAVENOUS; SUBCUTANEOUS at 11:57

## 2021-07-02 RX ADMIN — ASPIRIN 81 MG: 81 TABLET, COATED ORAL at 08:23

## 2021-07-02 NOTE — PLAN OF CARE
DYSPHAGIA THERAPY PLAN OF CARE:    Chance Lopez will benefit from formal dysphagia therapy  X3-5 days per week at 15-30 minutes sessions, as functionally tolerated, for 7-40 Days, to address:    Long Term Goal:  Pt will accept least restrictive diet tolerance w/o overt s/s aspiration.     Short Term Goals:    1. Pt will perform OROM/SHANA exercises x3 sets x10 reps w/ min cues.    2. Pt will perform compensatory techniques during meals w/ min cues.        Thank you-  Claribel Lovell M.S., CFY-SLP      Goal Outcome Evaluation:

## 2021-07-02 NOTE — PROGRESS NOTES
Inpatient Rehabilitation Functional Measures Assessment and Plan of Care    Plan of Care      Functional Measures  RENETTA Eating:  RENETTA Grooming:  RENETTA Bathing:  RENETTA Upper Body Dressing:  RENETTA Lower Body Dressing:  RENETTA Toileting:    RENETTA Bladder Management  Level of Assistance:  Frequency/Number of Accidents this Shift:    RENETTA Bowel Management  Level of Assistance:  Frequency/Number of Accidents this Shift:    RENETTA Bed/Chair/Wheelchair Transfer:  RENETTA Toilet Transfer:  RENETTA Tub/Shower Transfer:    Previously Documented Mode of Locomotion at Discharge:  [facility] Expected Mode of Locomotion at Discharge:  [facility] Walk/Wheelchair:  [facility] Stairs:    [facility] Comprehension:  [facility] Expression:  [facility] Social Interaction:  [facility] Problem Solving:  RENETTA Memory:    Therapy Mode Minutes  Occupational Therapy: Individual: 75 minutes.  Physical Therapy:  Speech Language Pathology:    Discharge Functional Goals:    Signed by: Gabi Marinelli, Occupational Therapist

## 2021-07-02 NOTE — THERAPY EVALUATION
"Inpatient Rehabilitation - Speech Language Pathology Initial Evaluation  [facility]   SPEECH LANGUAGE COGNITIVE EVALUATION     Patient Name: Chance Lopez  : 1955  MRN: [id number]  Today's Date: 2021             Admit Date: 2021     Chance Lopez  is seen this pm on  101/2S to participate in a formal s/l and cognitive evaluation to assess safety/function in adls. Pt is positioned in wheelchair to cooperatively participate. All results and observations of this evaluation are felt to be representative of pt's current functional status.    He had presented to [facility] in [place] with acute confusion of 2-day duration.  He had an MRI performed which showed acute/subacute infarct in the left periventricular occipital lobe without any evidence of hemorrhage.  EEG did show slow frequencies bilaterally consistent with cerebral dysfunction.He was transferred to [facility] and through further evaluation including CT a of the head and neck patient has stenosis of the left MCA segment.  Patient was also evaluated by psychiatry during his time in the emergency [place] for is a concern for catatonia.    Pt's family reports that he had begun to have memory problems approximately 1 month prior to admission.      Pt is noted to be eager to be finished w/ therapy tasks on this date and is tired.  He is noted to be easily distracted and mildly difficult to reorient to task.  Pt is noted to be slightly impulsive and attempts to roll wheelchair to leave speech therapy office as he states \"we're done now\".     Social History     Socioeconomic History   • Marital status:      Spouse name: Not on file   • Number of children: Not on file   • Years of education: Not on file   • Highest education level: Not on file   Tobacco Use   • Smoking status: Never Smoker   • Smokeless tobacco: Current User     Types: Chew   • Tobacco comment: REPORTS HE HAS CHEWED TOBACCO FOR 45 YEARS   Substance and Sexual " Activity   • Alcohol use: Yes     Comment: PATIENT REPORTS NO ETOH FOR 14 YEARS. REPORTS NO HX OF ABUSE   • Drug use: No   • Sexual activity: Defer        Diet Orders (active) (From admission, onward)     Start     Ordered    07/01/21 1822  Diet Soft Texture; Chopped; Thin; Consistent Carbohydrate  Diet Effective Now      07/01/21 1821                Pt is observed on room air w/o complications.     Receptive language skills are impacted. Pt is able to id common objects and personal body parts.  Pt is able to follow simple directives w/ multiple cues provided to follow multi-step directives.  Receptive language skills unable to be completely assessed on this date as pt's impulsivity and distractibility impede completion of assessment.      Expressive language skills are impacted. Pt is able to complete automatic confrontation naming tasks, and repeat at word/sentence level. No verbal apraxia noted. Expressive language skills unable to be completely assessed on this date as pt's impulsivity and distractibility impede completion of assessment.     Pt is independently oriented to person. Pt demonstrates understanding of idiomatic language. Cognitive skills unable to be completely assessed on this date as pt's impulsivity and distractibility impede completion of assessment.     Facial/oral structures are symmetrical upon observation. Oral mucosa are moist, pink and clean. Secretions are clear, thin and well controlled. OROM/SHANA is wfl w/o lingual deviation upon protrusion. Speech intensity, clarity and intelligibility are wfl w/o dysarthria or oral apraxia noted.    Reading skills are intact, premorbid baseline status.  He is noted w/ increased processing time.  Pt is able to graphically produce his wife's name, does not produce his own or his children's despite multiple directives.  Pt is able to id isolated letters, and simple and moderate words.  Additional reading and graphic skills unable to be assessed on this  date at pt's impulsivity and distractibility impede completion of assessment.     Pragmatic skills are wfl w/ appropriate eye gaze patterns and visual tracking. Pt is does not demonstrate appropriate topic maintenance and is often distracted w/ pt noted to be picking at gait belt and wheelchair arms. No left neglect evidenced. Humor response is intact w/ appropriate affect.      Visit Dx:  No diagnosis found.  Patient Active Problem List   Diagnosis   • Colon cancer screening   • Gastritis   • CVA (cerebral vascular accident) (CMS/HCC)     Past Medical History:   Diagnosis Date   • Diabetes mellitus (CMS/HCC)    • Diarrhea    • Full dentures     INSTRUCTED NO ADHESIVES THE DOS. REPORTS USUALLY ONLY WEARS UPPER PLATE.   • Hearing loss     REPORTS MORE LOSS ON LEFT SIDE BUT THAT HE HAS BILATERAL LOSS. NO USE OF HEARING AIDS.   • Hepatitis     REPORTS AS A CHILD.  UNSURE IF TYPE A OR B.   • History of acute pancreatitis     REPORTS PRIOR TO 2000   • History of gout    • Hypertension    • Seasonal allergies    • Stroke (CMS/HCC)    • Wears glasses     RX GLASSES     Past Surgical History:   Procedure Laterality Date   • COLONOSCOPY N/A 11/26/2018    Procedure: COLONOSCOPY;  Surgeon: Ha Sykes MD;  Location: [facility] ENDOSCOPY;  Service: Gastroenterology   • ENDOSCOPY N/A 11/26/2018    Procedure: ESOPHAGOGASTRODUODENOSCOPY with cold forcep biopsy;  Surgeon: Ha Sykes MD;  Location: [facility] ENDOSCOPY;  Service: Gastroenterology   • TOOTH EXTRACTION          EDUCATION  The patient has been educated in the following areas:   Cognitive Impairment Communication Impairment.    IMPRESSION:  Speech language evaluation unable to be completed on this date due to pt impulsivity and distractibility.      SLP Recommendation and Plan       Recommendations: SLP to f/u for completion of speech language evaluation to assess communication and cognition.     D/w pt results and recommendations w/ verbal understanding and  agreement.    D/w RN results and recommendations w/ verbal understanding and agreement.     Thank you for allowing me to participate in the care of your patient-  Claribel Lovell M.S., CFGAURVA-SLP                   Time Calculation:     Time Calculation- SLP     Row Name 07/02/21 1445 07/02/21 1148          Time Calculation- SLP    SLP Start Time  1400  -JR  0945  -     SLP Stop Time  1440  -JR  1005  -     SLP Time Calculation (min)  40 min  -  20 min  -     SLP Received On  --  07/02/21  -     SLP - Next Appointment  07/03/21  -  07/03/21  -       User Key  (r) = Recorded By, (t) = Taken By, (c) = Cosigned By    Initials Name Provider Type    Claribel Correa MS, CFY-SLP Speech and Language Pathologist          Therapy Charges for Today     Code Description Service Date Service Provider Modifiers Qty    71574553047 HC ST EVAL ORAL PHARYNG SWALLOW 1 7/2/2021 Claribel Lovell MS, CFGAURAV-SLP GN 1    [id number] HC ST EVAL SPEECH AND PROD W LANG  3 7/2/2021 Claribel Lovell MS, CFY-SLP GN 1                     Claribel Lovell MS, KITTY-SLP  7/2/2021

## 2021-07-02 NOTE — H&P
[facility] REHAB HISTORY AND PHYSICAL    Patient Identification:  Name:  Chance Lopez  Age:  65 y.o.  Sex:  male  :  1955  MRN:  [id number]   Visit Number:  [id number]  Room number:  101/2S  Primary Care Physician:  Ramos Gurrola MD     Subjective     2021   Chief complaint:  No chief complaint on file.      History of presenting illness:  65 y.o. male  This pt is seen today for post admission physician evaluation to the inpatient rehabilitation facility.  Patient is a 65-year-old gentleman with a past history of diabetes mellitus and hypertension, remote history of alcoholism per chart, pancreatitis remote history per chart.  Patient apparently had presented to [facility] in [place] with acute confusion of 2-day duration.  Patient had MRI performed which showed acute/subacute infarct in the left periventricular occipital lobe without any evidence of hemorrhage.  EEG did show slow frequencies bilaterally consistent with cerebral dysfunction.  Patient was initially evaluated and treated for possible alcohol withdrawal, possible meningitis as well.  Patient had work-up for meningitis which was negative.  Patient continued to have some agitation and had difficulty eating apparently.  Patient on questioning of his family is at been can have begun to have memory problems approximately 1 month prior to admission.  Patient was transferred to [facility] and through further evaluation including CT a of the head and neck patient has stenosis of the left MCA segment.  Neurosurgery was evaluated did evaluate patient because of abnormal cerebral angiogram and they felt that patient would benefit best from medical management.  Patient was also evaluated by psychiatry during his time in the emergency [place] for is a concern for catatonia.  Patient did have acute kidney injury as well which did improve throughout the admission.  Patient does have past history of diabetes  mellitus, hypertension and some recent dysphagia from his stroke.  Patient at this time states he is feeling reasonably well has no new complaints.      Patient continued to progress medically.  Physical therapy and Occupational therapy evaluations were completed with recommended acute inpatient rehabilitation referral for continued functional mobility intervention and reeducation.  Acute rehab referral ordered for continued medical monitoring and management post prolonged hospitalization, continued respiratory status monitoring, lab monitoring, pain mgt needs, bowel/bladder care with new medication education, skin monitoring and breakdown prevention along with ongoing medical comorbidities that require ongoing care.    The preadmission mini-FIM score as assessed by the referring facility as follows: Eating 5; grooming 4; bladder management 3; bathing for; dressing dressing upper body for; dressing lower body for; toileting 1; transfers to bed chair wheelchair 1; transfers to toilet 1; locomotion including walker wheelchair 1; bowel management 3; comprehension 3; expression 3; social reaction interaction 3; problem-solving 3; memory 3.  Estimated length of stay will be 7 to 10days.  Patient was independent at home prior to this acute event.    ---------------------------------------------------------------------------------------------------------------------   Review of Systems:  General: No fever no chills  HEENT: No headaches; patient has had some difficulty swallowing recently  Heart: No chest pain; no history of heart disease per patient report  Lungs: No cough, no shortness of breath  Abdomen: Negative  : Negative  Musculoskeletal: Negative  Neuro: Has recently been confused and forgetful although this is improved over the last week according to the notes [facility].  Skin:  Negative  ---------------------------------------------------------------------------------------------------------------------   Past Medical History:   Diagnosis Date   • Diabetes mellitus (CMS/[facility])    • Diarrhea    • Full dentures     INSTRUCTED NO ADHESIVES THE DOS. REPORTS USUALLY ONLY WEARS UPPER PLATE.   • Hearing loss     REPORTS MORE LOSS ON LEFT SIDE BUT THAT HE HAS BILATERAL LOSS. NO USE OF HEARING AIDS.   • Hepatitis     REPORTS AS A CHILD.  UNSURE IF TYPE A OR B.   • History of acute pancreatitis     REPORTS PRIOR TO 2000   • History of gout    • Hypertension    • Seasonal allergies    • Stroke (CMS/[facility])    • Wears glasses     RX GLASSES     Past Surgical History:   Procedure Laterality Date   • COLONOSCOPY N/A 11/26/2018    Procedure: COLONOSCOPY;  Surgeon: Ha Sykes MD;  Location: [facility] ENDOSCOPY;  Service: Gastroenterology   • ENDOSCOPY N/A 11/26/2018    Procedure: ESOPHAGOGASTRODUODENOSCOPY with cold forcep biopsy;  Surgeon: Ha Sykes MD;  Location: [facility] ENDOSCOPY;  Service: Gastroenterology   • TOOTH EXTRACTION       Family History   Problem Relation Age of Onset   • Diabetes Mother    • Diabetes Father      Social History     Socioeconomic History   • Marital status:      Spouse name: Not on file   • Number of children: Not on file   • Years of education: Not on file   • Highest education level: Not on file   Tobacco Use   • Smoking status: Never Smoker   • Smokeless tobacco: Current User     Types: Chew   • Tobacco comment: REPORTS HE HAS CHEWED TOBACCO FOR 45 YEARS   Substance and Sexual Activity   • Alcohol use: Yes     Comment: PATIENT REPORTS NO ETOH FOR 14 YEARS. REPORTS NO HX OF ABUSE   • Drug use: No   • Sexual activity: Defer     ---------------------------------------------------------------------------------------------------------------------   Allergies:  Patient has no known  allergies.  ---------------------------------------------------------------------------------------------------------------------   Medications below are reported home medications pulling from within the system; at this time, these medications have not been reconciled unless otherwise specified and are in the verification process for further verifcation as current home medications.    Prior to Admission Medications     Prescriptions Last Dose Informant Patient Reported? Taking?    amoxicillin-clarithromycin-lansoprazole (PREVPAC) combo pack   No No    Take  by mouth 2 (Two) Times a Day. Follow package directions.    bisacodyl (DULCOLAX) 5 MG EC tablet  Self No No    As directed/colon prep    Patient taking differently:  Take 5 mg by mouth Take As Directed. As directed/colon prep    cetirizine (zyrTEC) 10 MG tablet  Self Yes No    Take 10 mg by mouth Daily.    fluticasone (FLONASE) 50 MCG/ACT nasal spray  Self Yes No    1 spray into the nostril(s) as directed by provider Daily As Needed for Rhinitis or Allergies.    gabapentin (NEURONTIN) 600 MG tablet  Self Yes No    Take 600 mg by mouth 3 (Three) Times a Day As Needed.    lisinopril-hydrochlorothiazide (PRINZIDE,ZESTORETIC) 20-25 MG per tablet  Self Yes No    Take 1 tablet by mouth Daily.    NOVOLIN 70/30 RELION (70-30) 100 UNIT/ML injection  Self Yes No    Inject 28-30 Units under the skin into the appropriate area as directed Take As Directed. TAKE 30 UNITS EVERY MORNING AND 28 UNITS EVERY NIGHT    tiZANidine (ZANAFLEX) 4 MG tablet  Self Yes No    Take 4 mg by mouth 2 (Two) Times a Day.        Objective     Vital Signs:  Temp:  [97.8 °F (36.6 °C)-98.9 °F (37.2 °C)] 97.8 °F (36.6 °C)  Heart Rate:  [63-67] 66  Resp:  [16] 16  BP: (139-153)/(74-78) 153/74    No data found.  SpO2:  [96 %-98 %] 96 %  on   ;   Device (Oxygen Therapy): room air  Body mass index is 24.39 kg/m².    Wt Readings from Last 3 Encounters:   07/01/21 79.3 kg (174 lb 13.2 oz)   01/04/19 83.6 kg  (184 lb 3.2 oz)   12/14/18 83.9 kg (184 lb 15.5 oz)      ---------------------------------------------------------------------------------------------------------------------     Physical exam:  Constitutional:   No acute distress  HEENT: Normocephalic atraumatic  Neck: Supple   Cardiovascular: Regular rate and rhythm without murmurs rubs gallops  Pulmonary/Chest: Clear to auscultation  Abdominal: Positive bowel sounds soft.   Musculoskeletal: No arthropathy  Neurological: Patient has 5 out of 5 strength in all extremities at this time.  Patient states that he has had some balance issues that are mild.  Patient has had some difficulty swallowing apparently.  Patient is answering questions but does seem to have to have some difficulty elaborating.  Skin: No rashes noted  Peripheral vascular: Palpable pedal pulses  Genitourinary::  ---------------------------------------------------------------------------------------------------------------------  EKG:    No orders to display           Last echocardiogram:    --------------------------------------------------------------------------------------------------------------------  Labs:  Results from last 7 days   Lab Units 07/02/21  0206   WBC 10*3/mm3 5.12   HEMOGLOBIN g/dL 11.3*   HEMATOCRIT % 34.3*   MCV fL 99.7*   MCHC g/dL 32.9   PLATELETS 10*3/mm3 126*         Results from last 7 days   Lab Units 07/02/21  0206   SODIUM mmol/L 141   POTASSIUM mmol/L 4.2   MAGNESIUM mg/dL 1.6   CHLORIDE mmol/L 105   CO2 mmol/L 26.0   BUN mg/dL 15   CREATININE mg/dL 1.45*   EGFR IF NONAFRICN AM mL/min/1.73 49*   CALCIUM mg/dL 9.2   GLUCOSE mg/dL 180*   ALBUMIN g/dL 3.44*   BILIRUBIN mg/dL 0.4   ALK PHOS U/L 90   AST (SGOT) U/L 27   ALT (SGPT) U/L 35   Estimated Creatinine Clearance: 57 mL/min (A) (by C-G formula based on SCr of 1.45 mg/dL (H)).  No results found for: AMMONIA          Glucose   Date/Time Value Ref Range Status   07/02/2021 0601 188 (H) 70 - 130 mg/dL Final     Comment:      Meter: [id number] : [id number] Veronica Batista   07/01/2021 1957 239 (H) 70 - 130 mg/dL Final     Comment:     Meter: [id number] : [id number] Bobby DAVILA   07/01/2021 1845 156 (H) 70 - 130 mg/dL Final     Comment:     Meter: [id number] : [id number] monhollin heather     No results found for: TSH, FREET4  No results found for: PREGTESTUR, PREGSERUM, HCG, HCGQUANT  Pain Management Panel    There is no flowsheet data to display.       Brief Urine Lab Results     None        No results found for: BLOODCX  No results found for: URINECX  No results found for: WOUNDCX  No results found for: STOOLCX    Last Urine Toxicity    There is no flowsheet data to display.         I have personally looked at the labs and they are summarized above.  ----------------------------------------------------------------------------------------------------------------------  Detailed radiology reports for the last 24 hours:    Imaging Results (Last 24 Hours)     ** No results found for the last 24 hours. **        Final impressions for the last 30 days of radiology reports:    No radiology results for the last 30 days.  I have personally looked at the radiology images and read the final radiology report.    Assessment & Plan    Status post CVA--bihemispheric MCA infarctions--patient will require physical therapy 90 minutes/day 5 to 6 days/week for help with therapeutic exercise, range of motion, endurance training, gait training, safety, stair navigation and strengthening.  Will also require occupational therapy 90 minutes/day 5 to 6 days/week for help with dressing, ADLs, feeding, home skills, safety and toileting techniques.  We will also consult speech therapy at this time secondary to recent dysphagia.  We will continue aspirin therapy, statin therapy, Plavix therapy.    Acute encephalopathy--much improved.  We will continue Zyprexa and Paxil at this time.    Diabetes mellitus continue Levemir 8 units nightly along  with NovoLog 7 units before meals.  Will monitor and make necessary adjustments to insulin regimen.    Hypertension continue Coreg 12.5 mg twice daily    Acute kidney injury--creatinine is 1.4 at this time which is slightly improved from recent evaluation at .  We will continue to monitor closely  VTE Prophylaxis:   Mechanical Order History:     None      Pharmalogical Order History:      Ordered     Dose Route Frequency Stop    07/01/21 0476  enoxaparin (LOVENOX) syringe 40 mg      40 mg SC Every 24 Hours --                Fall risk evaluation: Elevated risk for falls at this time secondary to some balance issues.  Patient is status post CVA      Chance Underwood MD  [facility] Center[facility]  07/02/21  08:19 EDT

## 2021-07-02 NOTE — PROGRESS NOTES
Rehabilitation Nursing  Inpatient Rehabilitation Plan of Care Note    Plan of Care  Pain    Pain Management (Active)  Current Status (7/1/2021 5:54:00 PM): Potential for pain  Weekly Goal: No pain this week  Discharge Goal: No pain    Safety    Potential for Injury (Active)  Current Status (7/1/2021 5:54:00 PM): At risk for injury  Weekly Goal: No injury this week  Discharge Goal: No injury    Signed by: Syed Thakkar RN

## 2021-07-02 NOTE — PLAN OF CARE
Goal Outcome Evaluation:  Plan of Care Reviewed With: patient        Progress: improving  Outcome Summary: NEW ADMIT

## 2021-07-02 NOTE — PROGRESS NOTES
Patient Assessment Instrument  Quality Indicators - Admission    Section B. Hearing, Speech Vision      Section C. Cognitive Patterns      Section [id number]. Prior Functioning      Section OQ3342. Prior Device Use      Section HM0322. Self Care Performance      Section EJ8530. Self Care Discharge Goals      Section CX1943. Mobility Performance     Roll Left and Right: [place] provides verbal cues and/or touching/steadying  and/or contact guard assistance as patient completes activity. Assistance may be  provided throughout the activity or intermittently.   Sit to Lying: [place] provides verbal cues and/or touching/steadying and/or  contact guard assistance as patient completes activity. Assistance may be  provided throughout the activity or intermittently.   Lying to Sitting on Side of Bed: [place] provides verbal cues and/or  touching/steadying and/or contact guard assistance as patient completes  activity. Assistance may be provided throughout the activity or intermittently.   Sit to Stand: [place] provides verbal cues and/or touching/steadying and/or  contact guard assistance as patient completes activity. Assistance may be  provided throughout the activity or intermittently.   Chair/Bed to Chair Transfer: [place] provides verbal cues and/or  touching/steadying and/or contact guard assistance as patient completes  activity. Assistance may be provided throughout the activity or intermittently.   Toilet Transfer Not applicable.   Car Transfer: Not applicable.   Walk 10 Feet:   [place] provides verbal cues and/or touching/steadying and/or  contact guard assistance as patient completes activity. Assistance may be  provided throughout the activity or intermittently.  Walk 50 Feet with 2 Turns:   [place] provides verbal cues and/or  touching/steadying and/or contact guard assistance as patient completes  activity. Assistance may be provided throughout the activity or intermittently.  Walk 150 Feet:   [place] provides verbal cues  and/or touching/steadying and/or  contact guard assistance as patient completes activity. Assistance may be  provided throughout the activity or intermittently.  Walking 10 Feet on Uneven Surfaces:   Not attempted due to medical or safety  concerns.  1 Step Over Curb or Up/Down Stair:   Not attempted due to medical or safety  concerns.  Picking up an Object:   Not attempted due to medical or safety concerns.  Uses Wheelchair/Scooter: No    Section [id number]. Mobility Discharge Goals     Sit to Stand: [place] provides verbal cues or touching/steadying assistance as  patient completes activity.   Chair/Bed to Chair Transfer: [place] provides verbal cues or touching/steadying  assistance as patient completes activity.   Walk Discharge Goals:   Walk 150 Feet: [place] provides verbal cues or touching/steadying assistance as  patient completes activity.    Section H. Bladder and Bowel      Section I. Active Diagnosis      Section J. Health Conditions      Section K. Swallowing/Nutritional Status      Section M. Skin Conditions      Section N. Medication      Section O. Special Treatments, Procedures, and Programs      OPTIONAL BRANCH FOR TRACKING FALLS  Fall(s) During Shift:    Signed by: Linda Rodríguez, Physical Therapist

## 2021-07-02 NOTE — THERAPY EVALUATION
Inpatient Rehabilitation - Speech Language Pathology   Swallow Initial Evaluation  Elie   CLINICAL DYSPHAGIA ASSESSMENT     Patient Name: Chance Lopez  : 1955  MRN: [id number]  Today's Date: 2021             Admit Date: 2021    Chance Lopez  is seen at bedside this am on inpatient physical rehabilitation 101B to assess safety/efficacy of swallowing fnx, determine safest/least restrictive diet tolerance.  He was admitted on 21 w/ a CVA.      He had presented to [facility] in [place] with acute confusion of 2-day duration.  He had an MRI performed which showed acute/subacute infarct in the left periventricular occipital lobe without any evidence of hemorrhage.  EEG did show slow frequencies bilaterally consistent with cerebral dysfunction. Pt's family reports that he had begun to have memory problems approximately 1 month prior to admission.  He was transferred to [facility] and through further evaluation including CT a of the head and neck patient has stenosis of the left MCA segment.  Patient was also evaluated by psychiatry during his time in the emergency [place] for is a concern for catatonia.    Pt was noted w/ some difficulty eating while at [facility] in [place].  He has a medial history significant for diabetes mellitus, hypertension and some recent dysphagia from his stroke.    Per discussion w/ pt prior to po trials, pt states he was placed on a modified po diet but states this was for a short time only.  He is unable to provide specifics as to type of modified po diet and is unsure if this included thickened liquids.      Social History     Socioeconomic History   • Marital status:      Spouse name: Not on file   • Number of children: Not on file   • Years of education: Not on file   • Highest education level: Not on file   Tobacco Use   • Smoking status: Never Smoker   • Smokeless tobacco: Current User     Types: Chew   • Tobacco comment: REPORTS HE HAS CHEWED  TOBACCO FOR 45 YEARS   Substance and Sexual Activity   • Alcohol use: Yes     Comment: PATIENT REPORTS NO ETOH FOR 14 YEARS. REPORTS NO HX OF ABUSE   • Drug use: No   • Sexual activity: Defer        No recent chest xray available for review    Diet Orders (active) (From admission, onward)     Start     Ordered    07/01/21 1822  Diet Soft Texture; Chopped; Thin; Consistent Carbohydrate  Diet Effective Now      07/01/21 1821                Pt is observed on room air w/o complications.     Pt is positioned upright and centered in bed to accept multiple po presentations of ice chips solid cracker, puree and thin liquids via spoon, cup and straw.  Pt is able to self feed and does so across this evaluation.     Facial/oral structures are symmetrical upon observation. Lingual protrusion reveals no deviation but is noted w/ lingual tremors. Oral mucosa are moist, pink, and clean. Secretions are clear, thin, and well controlled. OROM/SHANA is generally weak to imitate oral postures. Gag is not assessed. Volitional cough is intact w/ mildly weak intensity, clear in quality, non-productive. Voice is adequate in intensity, clear in quality w/ intelligible speech.    Upon po presentations, adequate bolus anticipation and acceptance w/ good labial seal for bolus clearance via spoon bowl, cup rim stability and suction via straw. Bolus formation, manipulation, and control are weak w/ mixed phasic-rotary mastication pattern.  Pt is noted w/ prolonged mastication of ice chips and solid cracker.  A-p transit is timely w/o oral residue.     Pharyngeal swallow is generally weak w/ slightly weak hyolaryngeal elevation per palpation. No overt s/s aspiration evidenced across this evaluation. No silent aspiration suspected as pt is w/o changes in vocal quality, respirations or secretions post po presentations. Pt denies odynophagia.      Visit Dx:   No diagnosis found.  Patient Active Problem List   Diagnosis   • Colon cancer screening   •  Gastritis   • CVA (cerebral vascular accident) (CMS/HCC)     Past Medical History:   Diagnosis Date   • Diabetes mellitus (CMS/[facility])    • Diarrhea    • Full dentures     INSTRUCTED NO ADHESIVES THE DOS. REPORTS USUALLY ONLY WEARS UPPER PLATE.   • Hearing loss     REPORTS MORE LOSS ON LEFT SIDE BUT THAT HE HAS BILATERAL LOSS. NO USE OF HEARING AIDS.   • Hepatitis     REPORTS AS A CHILD.  UNSURE IF TYPE A OR B.   • History of acute pancreatitis     REPORTS PRIOR TO 2000   • History of gout    • Hypertension    • Seasonal allergies    • Stroke (CMS/[facility])    • Wears glasses     RX GLASSES     Past Surgical History:   Procedure Laterality Date   • COLONOSCOPY N/A 11/26/2018    Procedure: COLONOSCOPY;  Surgeon: Ha Sykes MD;  Location: [facility] ENDOSCOPY;  Service: Gastroenterology   • ENDOSCOPY N/A 11/26/2018    Procedure: ESOPHAGOGASTRODUODENOSCOPY with cold forcep biopsy;  Surgeon: Ha Sykes MD;  Location: [facility] ENDOSCOPY;  Service: Gastroenterology   • TOOTH EXTRACTION         EDUCATION  The patient has been educated in the following areas:   Dysphagia (Swallowing Impairment) Oral Care/Hydration Modified Diet Instruction.    IMPRESSION:  Pt presents w/ a mild oral dysphagia w/o s/s aspiration.  No s/s indicative of silent aspiration.  No odynophagia reported.  He is felt to most benefit from formal dysphagia tx to address observed oral dysphagia.  He is felt to most benefit from a continued modified po diet of mechanical soft solids, chopped meats, thin liquids w/ medications whole in puree/thins.     SLP Recommendation and Plan       Recommendations:  1. Mechanical soft solids, chopped meats, thin liquids.    2. Meds whole in puree/thins.   3. Upright and centered for all po intake  4. NASIM precautions.  5. Oral care protocol.  6. Universal aspiration precautions.     SLP to f/u for formal dysphagia tx.     D/w pt results and recommendations w/ verbal agreement.    D/w RN results and recommendations w/  verbal agreement.    Thank you for allowing me to participate in the care of your patient-  Claribel Lovell M.S., CFY-SLP                  Time Calculation:   Time Calculation- SLP     Row Name 07/02/21 1148             Time Calculation- SLP    SLP Start Time  0945  -JR      SLP Stop Time  1005  -      SLP Time Calculation (min)  20 min  -      SLP Received On  07/02/21  -      SLP - Next Appointment  07/03/21  -        User Key  (r) = Recorded By, (t) = Taken By, (c) = Cosigned By    Initials Name Provider Type    Claribel Correa MS, CFY-SLP Speech and Language Pathologist          Therapy Charges for Today     Code Description Service Date Service Provider Modifiers Qty    [id number]  ST EVAL ORAL PHARYNG SWALLOW 1 7/2/2021 Claribel Lovell MS, CFY-SLP GN 1               Claribel Lovell MS, AKSHATY-SLP  7/2/2021

## 2021-07-02 NOTE — PROGRESS NOTES
Case Management  Inpatient Rehabilitation Plan of Care and Discharge Plan Note    Rehabilitation Diagnosis:  Acute ischemic stroke of left periventricular  occipital, acute encephalopathy  Date of Onset:  05/27/21    Medical Summary:  PMH: DM, remote alcoholism, pancreatitis, HTN    Plan of Care      Expected Intensity:  Average of 3 hours of therapy 5 days/week.  Interdisciplinary Team:  Interdisciplinary Team: Medical Supervision and 24 Hour Rehabilitation Nursing.,  Physical Therapy:, Occupational Therapy:, Speech and Language Therapy:, Social  Work, Therapeutic Recreation.  Physical Therapy Intensity/Duration: 1-1.5 hrs/day, 5-6 days/week  Occupational Therapy Intensity/Duration: 1-1.5 hrs/day, 5-6 days/week  Speech Language Pathology  Intensity/Duration: 0.5-1 hr/day, 3-5 days/week  Estimated Length of Stay/Anticipated Discharge Date: 7-10 days  Anticipated Discharge Destination:  Anticipated discharge destination from inpatient rehabilitation is community  discharge with assistance. Pt plans to return home with his sons/DIL's helping  at home.      Based on the patient's medical and functional status, their prognosis and  expected level of functional improvement is:  Good    Signed by: Samantha Springer, Supervisor

## 2021-07-02 NOTE — PROGRESS NOTES
Patient Assessment Instrument  Quality Indicators - Admission    Section B. Hearing, Speech Vision      Section C. Cognitive Patterns      Section NP2788. Prior Functioning      Section IF6779. Prior Device Use      Section VM9628. Self Care Performance     Eating: [place] provides verbal cues and/or touching/steadying and/or contact  guard assistance as patient completes activity.   Oral Hygiene: [place] does less than half the effort. Dike lifts, holds or  supports trunk or limbs but provides less than half the effort.   Toileting Hygiene: [place] does less than half the effort. Dike lifts, holds  or supports trunk or limbs but provides less than half the effort.   Shower/Bathe Self: [place] does less than half the effort. Dike lifts, holds  or supports trunk or limbs but provides less than half the effort.   Upper Body Dressing: D[place] does less than half the effort. Dike lifts, holds  or supports trunk or limbs but provides less than half the effort.   Lower Body Dressing: [place] does less than half the effort. Dike lifts, holds  or supports trunk or limbs but provides less than half the effort.   Putting On/Taking Off Footwear: D[place] does less than half the effort. Dike  lifts, holds or supports trunk or limbs but provides less than half the effort.    Section EP3153. Self Care Discharge Goals     Eating: Dike sets up or cleans up; patient completes activity. Dike assists  only prior to or following the activity.   Oral Hygiene: Dike sets up or cleans up; patient completes activity. Dike  assists only prior to or following the activity.   Toileting Hygiene: [place] provides verbal cues or touching/steadying assistance  as patient completes activity.   Shower/Bathe Self: [place] provides verbal cues or touching/steadying assistance  as patient completes activity.   Upper Body Dressing: [place] provides verbal cues or touching/steadying  assistance as patient completes activity.   Lower Body  Dressing: [place] provides verbal cues or touching/steadying  assistance as patient completes activity.   Putting On/Taking Off Footwear: [place] provides verbal cues or  touching/steadying assistance as patient completes activity.    Section VL1522. Mobility Performance      Section WN3711. Mobility Discharge Goals      Section H. Bladder and Bowel      Section I. Active Diagnosis      Section J. Health Conditions      Section K. Swallowing/Nutritional Status      Section M. Skin Conditions      Section N. Medication      Section O. Special Treatments, Procedures, and Programs      OPTIONAL BRANCH FOR TRACKING FALLS  Fall(s) During Shift:    Signed by: Gabi Marinelli, Occupational Therapist

## 2021-07-02 NOTE — THERAPY EVALUATION
Inpatient Rehabilitation - Physical Therapy Initial Evaluation        Elie     Patient Name: Chance Lopez  : 1955  MRN: [id number]    Today's Date: 2021                    Admit Date: 2021      Visit Dx: No diagnosis found.    Patient Active Problem List   Diagnosis   • Colon cancer screening   • Gastritis   • CVA (cerebral vascular accident) (CMS/HCC)       Past Medical History:   Diagnosis Date   • Diabetes mellitus (CMS/HCC)    • Diarrhea    • Full dentures     INSTRUCTED NO ADHESIVES THE DOS. REPORTS USUALLY ONLY WEARS UPPER PLATE.   • Hearing loss     REPORTS MORE LOSS ON LEFT SIDE BUT THAT HE HAS BILATERAL LOSS. NO USE OF HEARING AIDS.   • Hepatitis     REPORTS AS A CHILD.  UNSURE IF TYPE A OR B.   • History of acute pancreatitis     REPORTS PRIOR TO    • History of gout    • Hypertension    • Seasonal allergies    • Stroke (CMS/HCC)    • Wears glasses     RX GLASSES       Past Surgical History:   Procedure Laterality Date   • COLONOSCOPY N/A 2018    Procedure: COLONOSCOPY;  Surgeon: Ha Sykes MD;  Location: [facility] ENDOSCOPY;  Service: Gastroenterology   • ENDOSCOPY N/A 2018    Procedure: ESOPHAGOGASTRODUODENOSCOPY with cold forcep biopsy;  Surgeon: Ha Sykes MD;  Location: [facility] ENDOSCOPY;  Service: Gastroenterology   • TOOTH EXTRACTION            PT ASSESSMENT (last 12 hours)      IRF PT Evaluation and Treatment     Row Name 21 1500          PT Time and Intention    Document Type  initial evaluation;daily treatment  -LB     Mode of Treatment  individual therapy;physical therapy  -LB     Row Name 21 1500          General Information    Existing Precautions/Restrictions  fall confusion  -LB     Row Name 21 1500          Cognition/Psychosocial    Affect/Mental Status (Cognitive)  confused  -LB     Follows Commands (Cognition)  verbal cues/prompting required;physical/tactile prompts required;increased processing time needed;repetition of  directions required  -LB     Personal Safety Interventions  gait belt;nonskid shoes/slippers when out of bed;supervised activity  -     Row Name 07/02/21 1500          Pain Assessment    Additional Documentation  --  -LB     Row Name 07/02/21 1500          Pain Scale: FACES Pre/Post-Treatment    Pain: FACES Scale, Pretreatment  0-->no hurt  -LB     Posttreatment Pain Rating  0-->no hurt  -LB     [facility] Name 07/02/21 1500          Strength (Manual Muscle Testing)    Strength (Manual Muscle Testing)  -- no unilateral strength deficits noted  -     Row Name 07/02/21 1500          Bed Mobility    Bed Mobility  --  -LB     Supine-Sit [place] (Bed Mobility)  contact guard;verbal cues;nonverbal cues (demo/gesture)  -     Row Name 07/02/21 1500          Transfer Assessment/Treatment    Transfers  --  -LB     Row Name 07/02/21 1500          Transfers    Bed-Chair [place] (Transfers)  contact guard;verbal cues;nonverbal cues (demo/gesture)  -LB     Chair-Bed [place] (Transfers)  contact guard;verbal cues;nonverbal cues (demo/gesture)  -LB     Assistive Device (Bed-Chair Transfers)  wheelchair  -LB     Sit-Stand [place] (Transfers)  contact guard;verbal cues;nonverbal cues (demo/gesture)  -LB     Stand-Sit [place] (Transfers)  contact guard;verbal cues;nonverbal cues (demo/gesture)  -     Row Name 07/02/21 1500          Chair-Bed Transfer    Assistive Device (Chair-Bed Transfers)  wheelchair  -LB     Row Name 07/02/21 1500          Sit-Stand Transfer    Assistive Device (Sit-Stand Transfers)  walker, front-wheeled  -LB     Row Name 07/02/21 1500          Stand-Sit Transfer    Assistive Device (Stand-Sit Transfers)  walker, front-wheeled  -LB     Row Name 07/02/21 1500          Gait/Stairs (Locomotion)    [place] Level (Gait)  contact guard;verbal cues;nonverbal cues (demo/gesture)  -LB     Assistive Device (Gait)  walker, front-wheeled  -LB     Distance in Feet (Gait)  160' x2, bid  -LB      Deviations/Abnormal Patterns (Gait)  ataxic;dee decreased;gait speed decreased;stride length decreased  -LB     Comment (Gait/Stairs)  needs verbal cues to steer around objects and for directions.  -LB     Row Name 07/02/21 1500          Safety Issues, Functional Mobility    Safety Issues Affecting Function (Mobility)  insight into deficits/self-awareness;judgment;problem-solving;safety precautions follow-through/compliance;sequencing abilities  -LB     Impairments Affecting Function (Mobility)  balance;cognition;coordination;endurance/activity tolerance;strength  -LB     Row Name 07/02/21 1500          Balance    Balance Assessment  --  -LB     Static Sitting Balance  -- SBA at EOB  -LB     Static Standing Balance  -- CGA with RW  -LB     Comment, Balance  sitting bean bag toss and  with reacher  -LB     Row Name 07/02/21 1500          Motor Skills    Therapeutic Exercise  -- sitting ex  -LB     Row Name 07/02/21 1500          Aerobic Exercise    Type (Aerobic Exercise)  recumbent stationary bike  -LB     Time Performed (Aerobic Exercise)  15 min level 1.0  -LB     Row Name 07/02/21 1500          IRF PT Goals    Bed Mobility Goal Selection (PT-IRF)  bed mobility, PT goal 1  -LB     Transfer Goal Selection (PT-IRF)  transfers, PT goal 1  -LB     Gait (Walking Locomotion) Goal Selection (PT-IRF)  gait, PT goal 1  -LB     Row Name 07/02/21 1500          Bed Mobility Goal 1 (PT-IRF)    Activity/Assistive Device (Bed Mobility Goal 1, PT-IRF)  sit to supine/supine to sit  -LB     [place] Level (Bed Mobility Goal 1, PT-IRF)  independent  -LB     Time Frame (Bed Mobility Goal 1, PT-IRF)  by discharge  -LB     Row Name 07/02/21 1500          Transfer Goal 1 (PT-IRF)    Activity/Assistive Device (Transfer Goal 1, PT-IRF)  sit-to-stand/stand-to-sit;bed-to-chair/chair-to-bed  -LB     [place] Level (Transfer Goal 1, PT-IRF)  supervision required  -LB     Time Frame (Transfer Goal 1, PT-IRF)  by discharge   -LB     Row Name 07/02/21 1500          Gait/Walking Locomotion Goal 1 (PT-IRF)    Activity/Assistive Device (Gait/Walking Locomotion Goal 1, PT-IRF)  walker, rolling  -LB     Gait/Walking Locomotion Distance Goal 1 (PT-IRF)  300'  -LB     [place] Level (Gait/Walking Locomotion Goal 1, PT-IRF)  supervision required  -LB     Time Frame (Gait/Walking Locomotion Goal 1, PT-IRF)  by discharge  -LB     Row Name 07/02/21 1500          Positioning and Restraints    Pre-Treatment Position  -- am-bed, pm-w/c  -LB     In Wheelchair  with OT bid  -LB     Row Name 07/02/21 1500          Therapy Assessment/Plan (PT)    Patient's Goals For Discharge  return home  -LB     Row Name 07/02/21 1500          Therapy Assessment/Plan (PT)    PT Diagnosis (PT)  s/p CVA, encephalopathy  -LB     Rehab Potential/Prognosis (PT)  adequate, monitor progress closely  -LB     Frequency of Treatment (PT)  5 times per week  -LB     Estimated Duration of Therapy (PT)  1 week;2 weeks  -LB     Problem List (PT)  balance;cognition;coordination;mobility;strength  -LB     Activity Limitations Related to Problem List (PT)  unable to ambulate safely;unable to transfer safely  -LB     Row Name 07/02/21 1500          Therapy Plan Review/Discharge Plan (PT)    Therapy Plan Review (PT)  evaluation/treatment results reviewed;care plan/treatment goals reviewed;risks/benefits reviewed;participants voiced agreement with care plan  -LB     Anticipated Equipment Needs at Discharge (PT Eval)  -- tbd  -LB     Expected Discharge Disposition (PT Eval)  home with home health care  -LB       User Key  (r) = Recorded By, (t) = Taken By, (c) = Cosigned By    Initials Name Provider Type    LB Linda Rodríguez, PT Physical Therapist           Physical Therapy Education                 Title: PT OT SLP Therapies (In Progress)     Topic: Physical Therapy (Done)     Point: Mobility training (Done)     Learning Progress Summary           Patient Acceptance, E, VU,NR by  LB at 7/2/2021 1524                   Point: Home exercise program (Done)     Learning Progress Summary           Patient Acceptance, E, VU,NR by LB at 7/2/2021 1524                   Point: Body mechanics (Done)     Learning Progress Summary           Patient Acceptance, E, VU,NR by LB at 7/2/2021 1524                   Point: Precautions (Done)     Learning Progress Summary           Patient Acceptance, E, VU,NR by LB at 7/2/2021 1524                               User Key     Initials Effective Dates Name Provider Type Discipline     06/16/21 -  Linda Rodríguez, PT Physical Therapist PT                PT Recommendation and Plan    Planned Therapy Interventions (PT): balance training, bed mobility training, gait training, motor coordination training, neuromuscular re-education, patient/family education, postural re-education, strengthening, transfer training  Frequency of Treatment (PT): 5 times per week  Anticipated Equipment Needs at Discharge (PT Eval):  (tbd)                  Time Calculation:     PT Charges     Row Name 07/02/21 1525 07/02/21 1524          Time Calculation    Start Time  1245  -LB  1000  -LB     Stop Time  1330  -LB  1045  -LB     Time Calculation (min)  45 min  -LB  45 min  -LB     PT Received On  --  07/02/21  -LB     PT Goal Re-Cert Due Date  --  07/09/21  -LB       User Key  (r) = Recorded By, (t) = Taken By, (c) = Cosigned By    Initials Name Provider Type    Linda Wiseman PT Physical Therapist          Therapy Charges for Today     Code Description Service Date Service Provider Modifiers Qty    43184439068 HC PT EVAL LOW COMPLEXITY 1 7/2/2021 Linda Rodríguez, PT GP 1    54667051836 HC GAIT TRAINING EA 15 MIN 7/2/2021 Linda Rodríguez, PT GP 2    61295853573 HC PT THER PROC EA 15 MIN 7/2/2021 Linda Rodríguez, PT GP 3                   Linda Rodríguez PT  7/2/2021

## 2021-07-02 NOTE — THERAPY EVALUATION
Inpatient Rehabilitation - Occupational Therapy Initial Evaluation     Elie     Patient Name: Chance Lopez  : 1955  MRN: [id number]    Today's Date: 2021                 Admit Date: 2021       No diagnosis found.    Patient Active Problem List   Diagnosis   • Colon cancer screening   • Gastritis   • CVA (cerebral vascular accident) (CMS/HCC)       Past Medical History:   Diagnosis Date   • Diabetes mellitus (CMS/HCC)    • Diarrhea    • Full dentures     INSTRUCTED NO ADHESIVES THE DOS. REPORTS USUALLY ONLY WEARS UPPER PLATE.   • Hearing loss     REPORTS MORE LOSS ON LEFT SIDE BUT THAT HE HAS BILATERAL LOSS. NO USE OF HEARING AIDS.   • Hepatitis     REPORTS AS A CHILD.  UNSURE IF TYPE A OR B.   • History of acute pancreatitis     REPORTS PRIOR TO    • History of gout    • Hypertension    • Seasonal allergies    • Stroke (CMS/HCC)    • Wears glasses     RX GLASSES       Past Surgical History:   Procedure Laterality Date   • COLONOSCOPY N/A 2018    Procedure: COLONOSCOPY;  Surgeon: Ha Sykes MD;  Location: [facility] ENDOSCOPY;  Service: Gastroenterology   • ENDOSCOPY N/A 2018    Procedure: ESOPHAGOGASTRODUODENOSCOPY with cold forcep biopsy;  Surgeon: Ha Sykes MD;  Location: [facility] ENDOSCOPY;  Service: Gastroenterology   • TOOTH EXTRACTION                  IRF OT ASSESSMENT FLOWSHEET (last 12 hours)      IRF OT Evaluation and Treatment     Row Name 21 1400          OT Time and Intention    Document Type  initial evaluation  -     Mode of Treatment  individual therapy;occupational therapy  -     Patient Effort  adequate  -     Row Name 21 1400          Relationship/Environment    Lives With  alone  -     Family Caregiver if Needed  child(oanh), adult  -     Row Name 21 1400          General Information    Patient/Family/Caregiver Comments/Observations  patient agreeable to therapy today.    -     Existing Precautions/Restrictions  fall mild  confusion, impulsive at times  -AH     Row Name 07/02/21 1400          Previous Level of Function/Home Environm    BADLs, Premorbid Functional Level  independent per patient  -AH     Row Name 07/02/21 1400          Living Environment    Current Living Arrangements  home/apartment/condo  -AH     Row Name 07/02/21 1400          Cognition/Psychosocial    Affect/Mental Status (Cognitive)  confused  -     Orientation Status (Cognition)  oriented to;person  -     Follows Commands (Cognition)  follows one-step commands;verbal cues/prompting required  -     Comment, Cognitive Interventions  patient required increased cues in pm possibly due to fatigue, patient more impulsive in pm  -AH     Row Name 07/02/21 1400          Range of Motion Comprehensive    General Range of Motion  no range of motion deficits identified  -AH     Row Name 07/02/21 1400          Strength Comprehensive (MMT)    General Manual Muscle Testing (MMT) Assessment  upper extremity strength deficits identified 4-/5 BUE  -AH     Row Name 07/02/21 1400          Transfers    [place] Level (Toilet Transfer)  minimum assist (75% patient effort);verbal cues  -AH     Row Name 07/02/21 1400          Toilet Transfer    Type (Toilet Transfer)  stand pivot/stand step  -AH     Row Name 07/02/21 1400          Motor Skills    Motor Skills  coordination;functional endurance;neuro-muscular function;therapeutic exercise  -     Therapeutic Exercise  -- BUE ROM, gmc,reaching, fmc  -AH     Row Name 07/02/21 1400          Bathing    [place] Level (Bathing)  moderate assist (50% patient effort);verbal cues  -AH     Row Name 07/02/21 1400          Upper Body Dressing    [place] Level (Upper Body Dressing)  minimum assist (75% or more patient effort);verbal cues  -AH     Row Name 07/02/21 1400          Lower Body Dressing    [place] Level (Lower Body Dressing)  moderate assist (50% patient effort);minimum assist (75% patient effort);verbal cues  [facility]      Row Name 07/02/21 1400          Grooming    [place] Level (Grooming)  supervision  -AH     Row Name 07/02/21 1400          Toileting    [place] Level (Toileting)  moderate assist (50% patient effort);verbal cues  -AH     Row Name 07/02/21 1400          Self-Feeding    [place] Level (Self-Feeding)  supervision;verbal cues  -AH     Row Name 07/02/21 1400          LB Dressing Goal 1 (OT-IRF)    Activity/Device (LB Dressing Goal 1, OT-IRF)  lower body dressing  -     [place] (LB Dressing Goal 1, OT-IRF)  verbal cues required;supervision required;set-up required  -AH     Row Name 07/02/21 1400          Toileting Goal 1 (OT-IRF)    Activity/Device (Toileting Goal 1, OT-IRF)  toileting skills, all  -AH     [place] Level (Toileting Goal 1, OT-IRF)  verbal cues required;minimum assist (75% or more patient effort)  -AH     Row Name 07/02/21 1400          Toileting Goal 2 (OT-IRF)    Activity/Device (Toileting Goal 2, OT-IRF)  toileting skills, all  -     [place] Level (Toileting Goal 2, OT-IRF)  set-up required;supervision required  -AH     Row Name 07/02/21 1400          Therapy Assessment/Plan (OT)    Patient's Goals For Discharge  return home  -AH     Row Name 07/02/21 1400          Therapy Assessment/Plan (OT)    Rehab Potential/Prognosis (OT)  good, to achieve stated therapy goals  -     Frequency of Treatment (OT)  5 times per week  -     Estimated Duration of Therapy (OT)  2 weeks  -     Activity Limitations Related to Problem List (OT)  BADLs not performed adequately or safely  -     Planned Therapy Interventions (OT)  activity tolerance training;adaptive equipment training;BADL retraining;neuromuscular control/coordination retraining;patient/caregiver education/training;ROM/therapeutic exercise;strengthening exercise;transfer/mobility retraining  -AH     Row Name 07/02/21 1400          Therapy Plan Review/Discharge Plan (OT)    Therapy Plan Review (OT)  patient  -        User Key  (r) = Recorded By, (t) = Taken By, (c) = Cosigned By    Initials Name Effective Dates    AH Ambreen Marinelli, OT 06/16/21 -            Occupational Therapy Education                 Title: PT OT SLP Therapies (Not Started)     Topic: Occupational Therapy (Not Started)     Point: ADL training (Not Started)     Description:   Instruct learner(s) on proper safety adaptation and remediation techniques during self care or transfers.   Instruct in proper use of assistive devices.              Learner Progress:  Not documented in this visit.          Point: Home exercise program (Not Started)     Description:   Instruct learner(s) on appropriate technique for monitoring, assisting and/or progressing therapeutic exercises/activities.              Learner Progress:  Not documented in this visit.          Point: Precautions (Not Started)     Description:   Instruct learner(s) on prescribed precautions during self-care and functional transfers.              Learner Progress:  Not documented in this visit.          Point: Body mechanics (Not Started)     Description:   Instruct learner(s) on proper positioning and spine alignment during self-care, functional mobility activities and/or exercises.              Learner Progress:  Not documented in this visit.                                OT Recommendation and Plan    Planned Therapy Interventions (OT): activity tolerance training, adaptive equipment training, BADL retraining, neuromuscular control/coordination retraining, patient/caregiver education/training, ROM/therapeutic exercise, strengthening exercise, transfer/mobility retraining                    Time Calculation:     Time Calculation- OT     Row Name 07/02/21 1455 07/02/21 1454          Time Calculation- OT    OT Start Time  1330  -  1045  -     OT Stop Time  1400  -  1130  -     OT Time Calculation (min)  30 min  -  45 min  -       User Key  (r) = Recorded By, (t) = Taken By, (c) =  Cosigned By    Initials Name Provider Type     Ambreen Marinelli, OT Occupational Therapist        Therapy Charges for Today     Code Description Service Date Service Provider Modifiers Qty    84423503475 HC OT EVAL MOD COMPLEXITY 3 7/2/2021 Ambreen Marinelli, OT GO 1    32475843101 HC OT THERAPEUTIC ACT EA 15 MIN 7/2/2021 Ambreen Marinelli OT GO 1    41528946072 HC OT SELF CARE/MGMT/TRAIN EA 15 MIN 7/2/2021 Ambreen Marinelli OT GO 1                   Ambreen Marinelli OT  7/2/2021

## 2021-07-02 NOTE — PROGRESS NOTES
Patient Assessment Instrument  Quality Indicators - Admission    Section B. Hearing, Speech Vision      Section C. Cognitive Patterns      Section [id number]. Prior Functioning      Section AU2193. Prior Device Use  Patient does not use manual or motorized wheelchair or scooter, mechanical lift,  walker, or an orthotic/prosthesis.    Section YI2101. Self Care Performance      Section EY9672. Self Care Discharge Goals      Section CR4279. Mobility Performance      Section AB3772. Mobility Discharge Goals      Section H. Bladder and Bowel      Section I. Active Diagnosis      Section J. Health Conditions      Section K. Swallowing/Nutritional Status      Section M. Skin Conditions      Section N. Medication      Section O. Special Treatments, Procedures, and Programs      OPTIONAL BRANCH FOR TRACKING FALLS  Fall(s) During Shift:    Signed by: Samantha Springer, Supervisor

## 2021-07-02 NOTE — PROGRESS NOTES
Rehabilitation Nursing  Inpatient Rehabilitation Plan of Care Note    Plan of Care  Pain    Pain Management (Active)  Current Status (7/1/2021 5:54:00 PM): Potential for pain  Weekly Goal: No pain this week  Discharge Goal: No pain    Safety    Potential for Injury (Active)  Current Status (7/1/2021 5:54:00 PM): At risk for injury  Weekly Goal: No injury this week  Discharge Goal: No injury    Signed by: Samantha Springer, Supervisor

## 2021-07-02 NOTE — PROGRESS NOTES
Inpatient Rehabilitation Functional Measures Assessment and Plan of Care    Plan of Care      Functional Measures  RENETTA Eating:  RENETTA Grooming:  RENETTA Bathing:  RENETTA Upper Body Dressing:  RENETTA Lower Body Dressing:  RENETTA Toileting:    RENETTA Bladder Management  Level of Assistance:  Frequency/Number of Accidents this Shift:    RENETTA Bowel Management  Level of Assistance:  Frequency/Number of Accidents this Shift:    RENETTA Bed/Chair/Wheelchair Transfer:  Bed/chair/wheelchair Transfer Score = 4.  Patient performs 75% or more of effort and minimal assistance (little/incidental  help/lifting of one limb/steadying) for transferring to and from the  bed/chair/wheelchair, requiring: Contact guard. Patient requires the following  assistive device(s): Walker. Seating system of wheelchair.  RENETTA Toilet Transfer:  RENETTA Tub/Shower Transfer:    Previously Documented Mode of Locomotion at Discharge:  RENETTA Expected Mode of Locomotion at Discharge: The expected mode of most  frequently used locomotion, at discharge, is expected to be walking.  RENETTA Walk/Wheelchair:  WHEELCHAIR OBSERVATION   Wheelchair did not occur.    WALK OBSERVATION   Walk Distance Scale = 3.  Distance walked is greater than 150 feet. Walk Score  = 4.  Patient performs 75% or more of effort and requires minimal assistance.  Incidental help/contact guard/steadying was provided. Patient walked a distance  of  160 feet. Patient requires the following assistive device(s): Rolling  walker.  RENETTA Stairs:  Stairs did not occur.    RENETTA Comprehension:  RENETTA Expression:  RENETTA Social Interaction:  RENETTA Problem Solving:  RENETTA Memory:    Therapy Mode Minutes  Occupational Therapy:  Physical Therapy: Individual: 90 minutes.  Speech Language Pathology:    Discharge Functional Goals:  Bed, Chair, Wheelchair Transfers: 5  Walk: 5    Signed by: Linda Rodríguez Physical Therapist

## 2021-07-02 NOTE — SIGNIFICANT NOTE
07/02/21 0840   Living Environment   Lives With alone   Current Living Arrangements home/apartment/condo   Primary Care Provided by self   Provides Primary Care For no one   Family Caregiver if Needed child(oanh), adult   Family Caregiver Names son Ulysses Lopez (and spouse) and son Obi Lopez (and spouse)   Quality of Family Relationships helpful;involved;supportive   Able to Return to Prior Arrangements yes   Living Arrangement Comments Spoke to pt who states being  for 2-3 years and lives alone in a mobile home with 2-3 steps to enter.  He has 2 sons: Ulysses Lopez who lives in [place] and works at [employer]; son Obi Lopez lives in [place].  His main support person is son Ulysses Lopez.  He states that he has had home health in the past from an agency in [facility].  He states that he has had outpatient therapy in the past in [place], but doesn't remember why.  Pt states he has a RW, glucometer, BP cuff, and thinks he has a W/C.  DME provider is unknown.  Pt lives in a mobile home with 2 steps to enter and handrail on both sides.  He receives Social Security income.  He has [employer] Medicare Replacement and [place] Medicaid insurance.  Both insurances has prescription benefits.  He uses [employer] in [place].  He does not have an advanced directive, living will, or POA.  Prior to hospitalization, he was independent with ADLs and mobility.  He did not use any assistive device prior to CVA.  He did need help with transportation and shopping.  He states that he did not drive.  His son Ulysses will provide transportation home at discharge.  Pt plans to return home with his sons/DIL's helping.  Pt is aware that team conference will be held on 7/6 to discuss progress in therapy and discharge planning.  SS will continue to follow for discharge planning needs.   Transition Planning   Patient/Family Anticipates Transition to home with family   Patient/Family Anticipated Services at Transition durable medical equipment;home health  care   Transportation Anticipated family or friend will provide   Discharge Needs Assessment (IRF)   Concerns to be Addressed discharge planning   Equipment Currently Used at Home walker, rolling;glucometer;other (see comments)  (BP cuff)   Anticipated Changes Related to Illness inability to care for self

## 2021-07-03 LAB
GLUCOSE BLDC GLUCOMTR-MCNC: 186 MG/DL (ref 70–130)
GLUCOSE BLDC GLUCOMTR-MCNC: 259 MG/DL (ref 70–130)
GLUCOSE BLDC GLUCOMTR-MCNC: 278 MG/DL (ref 70–130)
GLUCOSE BLDC GLUCOMTR-MCNC: 306 MG/DL (ref 70–130)
MAGNESIUM SERPL-MCNC: 2.1 MG/DL (ref 1.6–2.4)

## 2021-07-03 PROCEDURE — 63710000001 INSULIN ASPART PER 5 UNITS: Performed by: FAMILY MEDICINE

## 2021-07-03 PROCEDURE — 99232 SBSQ HOSP IP/OBS MODERATE 35: CPT | Performed by: FAMILY MEDICINE

## 2021-07-03 PROCEDURE — 63710000001 INSULIN DETEMIR PER 5 UNITS: Performed by: FAMILY MEDICINE

## 2021-07-03 PROCEDURE — 82962 GLUCOSE BLOOD TEST: CPT

## 2021-07-03 PROCEDURE — 97110 THERAPEUTIC EXERCISES: CPT

## 2021-07-03 PROCEDURE — 97112 NEUROMUSCULAR REEDUCATION: CPT

## 2021-07-03 PROCEDURE — 83735 ASSAY OF MAGNESIUM: CPT | Performed by: FAMILY MEDICINE

## 2021-07-03 PROCEDURE — 97530 THERAPEUTIC ACTIVITIES: CPT

## 2021-07-03 PROCEDURE — 97535 SELF CARE MNGMENT TRAINING: CPT

## 2021-07-03 PROCEDURE — 97116 GAIT TRAINING THERAPY: CPT

## 2021-07-03 PROCEDURE — 25010000002 ENOXAPARIN PER 10 MG: Performed by: FAMILY MEDICINE

## 2021-07-03 PROCEDURE — 92523 SPEECH SOUND LANG COMPREHEN: CPT

## 2021-07-03 RX ADMIN — PAROXETINE HYDROCHLORIDE 20 MG: 20 TABLET, FILM COATED ORAL at 08:32

## 2021-07-03 RX ADMIN — Medication 1 TABLET: at 08:32

## 2021-07-03 RX ADMIN — CLOPIDOGREL 75 MG: 75 TABLET, FILM COATED ORAL at 08:31

## 2021-07-03 RX ADMIN — ATORVASTATIN CALCIUM 40 MG: 40 TABLET, FILM COATED ORAL at 21:16

## 2021-07-03 RX ADMIN — OLANZAPINE 5 MG: 5 TABLET, FILM COATED ORAL at 21:16

## 2021-07-03 RX ADMIN — INSULIN ASPART 7 UNITS: 100 INJECTION, SOLUTION INTRAVENOUS; SUBCUTANEOUS at 11:37

## 2021-07-03 RX ADMIN — FLUTICASONE PROPIONATE 2 SPRAY: 50 SPRAY, METERED NASAL at 08:32

## 2021-07-03 RX ADMIN — ENOXAPARIN SODIUM 40 MG: 40 INJECTION SUBCUTANEOUS at 21:16

## 2021-07-03 RX ADMIN — MAGNESIUM HYDROXIDE 15 ML: 2400 SUSPENSION ORAL at 21:16

## 2021-07-03 RX ADMIN — ASPIRIN 81 MG: 81 TABLET, COATED ORAL at 08:31

## 2021-07-03 RX ADMIN — CARVEDILOL 12.5 MG: 6.25 TABLET, FILM COATED ORAL at 16:49

## 2021-07-03 RX ADMIN — INSULIN DETEMIR 8 UNITS: 100 INJECTION, SOLUTION SUBCUTANEOUS at 22:23

## 2021-07-03 RX ADMIN — INSULIN ASPART 7 UNITS: 100 INJECTION, SOLUTION INTRAVENOUS; SUBCUTANEOUS at 16:49

## 2021-07-03 RX ADMIN — CETIRIZINE HYDROCHLORIDE 10 MG: 10 TABLET, FILM COATED ORAL at 08:31

## 2021-07-03 RX ADMIN — INSULIN ASPART 7 UNITS: 100 INJECTION, SOLUTION INTRAVENOUS; SUBCUTANEOUS at 08:32

## 2021-07-03 RX ADMIN — CARVEDILOL 12.5 MG: 6.25 TABLET, FILM COATED ORAL at 08:31

## 2021-07-03 NOTE — PROGRESS NOTES
Rehabilitation Nursing  Inpatient Rehabilitation Plan of Care Note    Plan of Care  Copy from Suzette    Pain Management (Active)  Current Status (7/1/2021 5:54:00 PM): Potential for pain  Weekly Goal: No pain this week  Discharge Goal: No pain    Safety    Potential for Injury (Active)  Current Status (7/1/2021 5:54:00 PM): At risk for injury  Weekly Goal: No injury this week  Discharge Goal: No injury    Signed by: Ramonita Duke, Nurse

## 2021-07-03 NOTE — PLAN OF CARE
Goal Outcome Evaluation:   Patient progressing with all therapies. Continue current POC

## 2021-07-03 NOTE — THERAPY TREATMENT NOTE
Inpatient Rehabilitation - Physical Therapy Treatment Note        Elie     Patient Name: Chance Lopez  : 1955  MRN: [id number]    Today's Date: 7/3/2021                    Admit Date: 2021      Visit Dx: No diagnosis found.    Patient Active Problem List   Diagnosis   • Colon cancer screening   • Gastritis   • CVA (cerebral vascular accident) (CMS/HCC)       Past Medical History:   Diagnosis Date   • Diabetes mellitus (CMS/HCC)    • Diarrhea    • Full dentures     INSTRUCTED NO ADHESIVES THE DOS. REPORTS USUALLY ONLY WEARS UPPER PLATE.   • Hearing loss     REPORTS MORE LOSS ON LEFT SIDE BUT THAT HE HAS BILATERAL LOSS. NO USE OF HEARING AIDS.   • Hepatitis     REPORTS AS A CHILD.  UNSURE IF TYPE A OR B.   • History of acute pancreatitis     REPORTS PRIOR TO    • History of gout    • Hypertension    • Seasonal allergies    • Stroke (CMS/HCC)    • Wears glasses     RX GLASSES       Past Surgical History:   Procedure Laterality Date   • COLONOSCOPY N/A 2018    Procedure: COLONOSCOPY;  Surgeon: Ha Sykes MD;  Location: [facility] ENDOSCOPY;  Service: Gastroenterology   • ENDOSCOPY N/A 2018    Procedure: ESOPHAGOGASTRODUODENOSCOPY with cold forcep biopsy;  Surgeon: Ha Sykes MD;  Location: [facility] ENDOSCOPY;  Service: Gastroenterology   • TOOTH EXTRACTION            PT ASSESSMENT (last 12 hours)      IRF PT Evaluation and Treatment     Row Name 21 1348          PT Time and Intention    Document Type  daily treatment  -LB     Mode of Treatment  individual therapy;physical therapy  -LB     Patient/Family/Caregiver Comments/Observations  He had more difficulty processing today. He needed much more cueing and repeated directions.  -LB     Row Name 21 1348          General Information    Existing Precautions/Restrictions  fall confusion  -LB     Row Name 21 1348          Cognition/Psychosocial    Affect/Mental Status (Cognitive)  confused  -LB     Follows Commands  (Cognition)  verbal cues/prompting required;physical/tactile prompts required;increased processing time needed;repetition of directions required  -LB     Personal Safety Interventions  gait belt;nonskid shoes/slippers when out of bed;supervised activity  -LB     Row Name 07/03/21 1348          Pain Scale: FACES Pre/Post-Treatment    Pain: FACES Scale, Pretreatment  0-->no hurt  -LB     Posttreatment Pain Rating  0-->no hurt  -LB     Row Name 07/03/21 1348          Bed Mobility    Supine-Sit [place] (Bed Mobility)  contact guard;verbal cues;nonverbal cues (demo/gesture)  -LB     Comment (Bed Mobility)  but max cueing to get to EOB.   -     Row Name 07/03/21 1348          Transfer Assessment/Treatment    Comment (Transfers)  he had more difficulty with pivoting today due to decreased processing  -[facility] Name 07/03/21 1348          Transfers    Bed-Chair [place] (Transfers)  verbal cues;nonverbal cues (demo/gesture);minimum assist (75% patient effort)  -LB     Chair-Bed [place] (Transfers)  verbal cues;nonverbal cues (demo/gesture);minimum assist (75% patient effort)  -LB     Assistive Device (Bed-Chair Transfers)  wheelchair  -LB     Sit-Stand [place] (Transfers)  contact guard;verbal cues;nonverbal cues (demo/gesture)  -LB     Stand-Sit [place] (Transfers)  contact guard;verbal cues;nonverbal cues (demo/gesture)  -LB     Row Name 07/03/21 1348          Chair-Bed Transfer    Assistive Device (Chair-Bed Transfers)  wheelchair  -LB     Row Name 07/03/21 1348          Sit-Stand Transfer    Assistive Device (Sit-Stand Transfers)  walker, front-wheeled  -LB     Row Name 07/03/21 1348          Stand-Sit Transfer    Assistive Device (Stand-Sit Transfers)  walker, front-wheeled  -LB     Row Name 07/03/21 1348          Gait/Stairs (Locomotion)    [place] Level (Gait)  contact guard;verbal cues;nonverbal cues (demo/gesture)  -LB     Assistive Device (Gait)  walker, front-wheeled  -LB      Distance in Feet (Gait)  80' x2  -LB     Deviations/Abnormal Patterns (Gait)  ataxic;dee decreased;gait speed decreased;stride length decreased  -LB     Row Name 07/03/21 1348          Balance    Comment, Balance  bean bag toss from w/c  -LB     Row Name 07/03/21 1348          Motor Skills    Therapeutic Exercise  -- standing ex in PB, sitting ex  -LB     Additional Documentation  -- much difficulty today with ex due to confusion  -LB     Row Name 07/03/21 1348          Aerobic Exercise    Type (Aerobic Exercise)  recumbent stationary bike  -LB     Time Performed (Aerobic Exercise)  15 min level 1.0  -LB     Row Name 07/03/21 1348          IRF PT Goals    Bed Mobility Goal Selection (PT-IRF)  bed mobility, PT goal 1  -LB     Transfer Goal Selection (PT-IRF)  transfers, PT goal 1  -LB     Gait (Walking Locomotion) Goal Selection (PT-IRF)  gait, PT goal 1  -LB     Row Name 07/03/21 1343          Bed Mobility Goal 1 (PT-IRF)    Activity/Assistive Device (Bed Mobility Goal 1, PT-IRF)  sit to supine/supine to sit  -LB     [place] Level (Bed Mobility Goal 1, PT-IRF)  independent  -LB     Time Frame (Bed Mobility Goal 1, PT-IRF)  by discharge  -LB     Row Name 07/03/21 1345          Transfer Goal 1 (PT-IRF)    Activity/Assistive Device (Transfer Goal 1, PT-IRF)  sit-to-stand/stand-to-sit;bed-to-chair/chair-to-bed  -LB     [place] Level (Transfer Goal 1, PT-IRF)  supervision required  -LB     Time Frame (Transfer Goal 1, PT-IRF)  by discharge  -LB     Row Name 07/03/21 1345          Gait/Walking Locomotion Goal 1 (PT-IRF)    Activity/Assistive Device (Gait/Walking Locomotion Goal 1, PT-IRF)  walker, rolling  -LB     Gait/Walking Locomotion Distance Goal 1 (PT-IRF)  300'  -LB     [place] Level (Gait/Walking Locomotion Goal 1, PT-IRF)  supervision required  -LB     Time Frame (Gait/Walking Locomotion Goal 1, PT-IRF)  by discharge  -LB     Row Name 07/03/21 134          Positioning and Restraints     Pre-Treatment Position  in bed  -LB     In Wheelchair  notified nsg;encouraged to call for assist in [place] at nursing station  -LB     Row Name 07/03/21 1348          Therapy Assessment/Plan (PT)    Patient's Goals For Discharge  return home  -LB     Row Name 07/03/21 1348          Therapy Assessment/Plan (PT)    Rehab Potential/Prognosis (PT)  adequate, monitor progress closely  -LB     Frequency of Treatment (PT)  5 times per week  -LB     Estimated Duration of Therapy (PT)  1 week;2 weeks  -LB     Problem List (PT)  balance;cognition;coordination;mobility;strength  -LB     Activity Limitations Related to Problem List (PT)  unable to ambulate safely;unable to transfer safely  -LB     Row Name 07/03/21 1348          Daily Progress Summary (PT)    Recommendations (PT)  continue PT  -LB     Row Name 07/03/21 1348          Therapy Plan Review/Discharge Plan (PT)    Anticipated Equipment Needs at Discharge (PT Eval)  -- tbd  -LB     Expected Discharge Disposition (PT Eval)  home with home health care  -LB       User Key  (r) = Recorded By, (t) = Taken By, (c) = Cosigned By    Initials Name Provider Type    Linda Wiseman, PT Physical Therapist           Physical Therapy Education                 Title: PT OT SLP Therapies (In Progress)     Topic: Physical Therapy (Done)     Point: Mobility training (Done)     Learning Progress Summary           Patient Acceptance, E, VU,NR by LB at 7/3/2021 1357    Acceptance, E, VU,NR by LB at 7/2/2021 1524                   Point: Home exercise program (Done)     Learning Progress Summary           Patient Acceptance, E, VU,NR by LB at 7/3/2021 1357    Acceptance, E, VU,NR by LB at 7/2/2021 1524                   Point: Body mechanics (Done)     Learning Progress Summary           Patient Acceptance, E, VU,NR by LB at 7/3/2021 1357    Acceptance, E, VU,NR by LB at 7/2/2021 1524                   Point: Precautions (Done)     Learning Progress Summary           Patient  Acceptance, E, VU,NR by LB at 7/3/2021 1357    Acceptance, E, VU,NR by LB at 7/2/2021 1524                               User Key     Initials Effective Dates Name Provider Type Discipline     06/16/21 -  Linda Rodríguez PT Physical Therapist PT                PT Recommendation and Plan    Planned Therapy Interventions (PT): balance training, bed mobility training, gait training, motor coordination training, neuromuscular re-education, patient/family education, postural re-education, strengthening, transfer training  Frequency of Treatment (PT): 5 times per week  Anticipated Equipment Needs at Discharge (PT Eval):  (tbd)  Daily Progress Summary (PT)  Recommendations (PT): continue PT               Time Calculation:     PT Charges     Row Name 07/03/21 1357             Time Calculation    Start Time  0915  -LB      Stop Time  1045  -LB      Time Calculation (min)  90 min  -LB      PT Received On  07/03/21  -LB        User Key  (r) = Recorded By, (t) = Taken By, (c) = Cosigned By    Initials Name Provider Type    LB Linda Rodríguez, PT Physical Therapist          Therapy Charges for Today     Code Description Service Date Service Provider Modifiers Qty    70804775595 HC PT EVAL LOW COMPLEXITY 1 7/2/2021 Linda Rodríguez, PT GP 1    13826258706 HC GAIT TRAINING EA 15 MIN 7/2/2021 Linda Rodríguez, PT GP 2    43275719637 HC PT THER PROC EA 15 MIN 7/2/2021 Linda Rodríguez, PT GP 3    98420049086 HC GAIT TRAINING EA 15 MIN 7/3/2021 Linda Rodríguez, PT GP 1    06388790606 HC PT NEUROMUSC RE EDUCATION EA 15 MIN 7/3/2021 Linda Rodríguez, PT GP 1    58288917929 HC PT THER PROC EA 15 MIN 7/3/2021 Linda Rodríguez, PT GP 4                   Linda Rodríguez PT  7/3/2021

## 2021-07-03 NOTE — THERAPY EVALUATION
"Inpatient Rehabilitation - Speech Language Pathology Initial Evaluation  [facility]   SPEECH LANGUAGE COGNITIVE EVALUATION     Patient Name: Chance Lopez  : 1955  MRN: [id number]  Today's Date: 7/3/2021             Admit Date: 2021     Chance Lopez  is seen this am on  101/2S to participate in a formal s/l and cognitive evaluation to assess safety/function in adls. Pt is positioned in bed to cooperatively participate. All results and observations of this evaluation are felt to be representative of pt's current functional status.    Pt is pleasant and cooperative across evaluation.  He states that he often \"can't think of what I want to say\".  Pt is noted w/ decreased distractibility on this date.     Social History     Socioeconomic History   • Marital status:      Spouse name: Not on file   • Number of children: Not on file   • Years of education: Not on file   • Highest education level: Not on file   Tobacco Use   • Smoking status: Never Smoker   • Smokeless tobacco: Current User     Types: Chew   • Tobacco comment: REPORTS HE HAS CHEWED TOBACCO FOR 45 YEARS   Substance and Sexual Activity   • Alcohol use: Yes     Comment: PATIENT REPORTS NO ETOH FOR 14 YEARS. REPORTS NO HX OF ABUSE   • Drug use: No   • Sexual activity: Defer        Diet Orders (active) (From admission, onward)     Start     Ordered    21  Diet Soft Texture; Chopped; Thin; Consistent Carbohydrate  Diet Effective Now      21                Pt is observed on room air w/o complications.     Receptive language skills are impacted. Pt is able to id common objects and personal body parts, and follow simple directives. Pt is noted w/ increased processing time and to require multiple repetitions and visual cues to follow multi-step directives.  Pt does understand complex \"wh\" questions w/ mild-moderately increased processing time.  Pt demonstrates difficulty w/ participating in conversational exchanges related to " "increased processing time and need for multiple cues and repetitions.    Expressive language skills are impacted. Pt is able to complete automatic speech tasks w/ cues and models.  Pt is able to complete confrontation naming tasks and repeat at word/sentence and multiple digit levels.  Pt is unable to complete oe sentences.  Pt responds to compete oe \"wh\" questions w/ noted increased processing time and repetitions required.  Pt demonstrates difficulty w/ participation in complex conversational exchanges related to receptive and expressive language difficulties.  Pt is noted w/ moderate anomia.  No verbal apraxia noted.     Pt is independently oriented to person. Immediate, STM and LTM are impacted.  Pt is unable recall recent adls and provide personal information w/o delays.  Pt is able to provide name and names of children on this date. Thought organization is delayed and requires multiple cues and prompts. Processing and problem solving are moderately impacted w/ pt demonstrating understanding of idiomatic language. Pt is unable to demonstrate appropriate comparing/contrasting w/ pt providing vague blanket statements.  Pt does not respond to convergent and divergent thinking tasks, but does eventually make vague blanket statements related to other topics.      Facial/oral structures are symmetrical upon observation. Oral mucosa are moist, pink and clean. Secretions are clear, thin and well controlled. OROM/SHANA is wfl w/o lingual deviation upon protrusion. Speech intensity, clarity and intelligibility are wfl w/o dysarthria or oral apraxia noted.    Graphic skills are impacted w/ pt unable to graphically produce name.  Pt does graphically produce letters in sequence, though not \"words\" and often includes numbers as well.  Reading skills are intact, premorbid baseline status. Pt is able to id isolated letters, simple and moderate words w/ increased processing time, as well as sentences and small paragraphs. "     Pragmatic skills are wfl w/ appropriate eye gaze patterns and visual tracking. Language is appropriate in context w/ topic initiation.  Pt does not demonstrate appropriate topic maintenance independently. No left neglect evidenced. Humor response is intact w/ appropriate affect.        Visit Dx:  No diagnosis found.  Patient Active Problem List   Diagnosis   • Colon cancer screening   • Gastritis   • CVA (cerebral vascular accident) (CMS/HCC)     Past Medical History:   Diagnosis Date   • Diabetes mellitus (CMS/HCC)    • Diarrhea    • Full dentures     INSTRUCTED NO ADHESIVES THE DOS. REPORTS USUALLY ONLY WEARS UPPER PLATE.   • Hearing loss     REPORTS MORE LOSS ON LEFT SIDE BUT THAT HE HAS BILATERAL LOSS. NO USE OF HEARING AIDS.   • Hepatitis     REPORTS AS A CHILD.  UNSURE IF TYPE A OR B.   • History of acute pancreatitis     REPORTS PRIOR TO 2000   • History of gout    • Hypertension    • Seasonal allergies    • Stroke (CMS/HCC)    • Wears glasses     RX GLASSES     Past Surgical History:   Procedure Laterality Date   • COLONOSCOPY N/A 11/26/2018    Procedure: COLONOSCOPY;  Surgeon: Ha Sykes MD;  Location: [facility] ENDOSCOPY;  Service: Gastroenterology   • ENDOSCOPY N/A 11/26/2018    Procedure: ESOPHAGOGASTRODUODENOSCOPY with cold forcep biopsy;  Surgeon: Ha Sykes MD;  Location: [facility] ENDOSCOPY;  Service: Gastroenterology   • TOOTH EXTRACTION          EDUCATION  The patient has been educated in the following areas:   Cognitive Impairment Communication Impairment.    IMPRESSION:Pt is noted w/ speech-language deficits related to processing time, anomia, STM, LTM, following directions, sequencing, comparing/contrasting, convergent and divergent thinking tasks, topic maintenance, and graphic skills.  Pt is noted to require multiple cues and prompts for all tasks and to make vague blanket statements related to most prompts.     SLP Recommendation and Plan       Recommendations: SLP to f/u to address  noted speech language deficits.     D/w pt results and recommendations w/ verbal understanding and agreement.    D/w RN results and recommendations w/ verbal understanding and agreement.     Thank you for allowing me to participate in the care of your patient-  Claribel Lovell M.S., CFY-SLP                  Time Calculation:     Time Calculation- SLP     Row Name 07/03/21 1346             Time Calculation- SLP    SLP Start Time  0830  -JR      SLP Stop Time  0900  -      SLP Time Calculation (min)  30 min  -      SLP - Next Appointment  07/05/21  -        User Key  (r) = Recorded By, (t) = Taken By, (c) = Cosigned By    Initials Name Provider Type    Claribel Correa MS, CFY-SLP Speech and Language Pathologist          Therapy Charges for Today     Code Description Service Date Service Provider Modifiers Qty    12439187808 HC ST EVAL ORAL PHARYNG SWALLOW 1 7/2/2021 Claribel Lovell MS, CFY-SLP GN 1    [id number] HC ST EVAL SPEECH AND PROD W LANG  3 7/2/2021 Claribel Lovell MS, CFY-SLP GN 1    [id number] HC ST EVAL SPEECH AND PROD W LANG  2 7/3/2021 Claribel Lovell MS, CFY-SLP GN 1                     Claribel Lovell MS, KITTY-SLP  7/3/2021

## 2021-07-03 NOTE — PROGRESS NOTES
Physical Medicine and Rehabilitation  Inpatient Rehabilitation Interdisciplinary Plan of Care    Demographics            Age: 65Y            Gender: Male    Admission Date: 7/1/2021 5:54:00 PM  Rehabilitation Diagnosis:  Acute ischemic stroke of left periventricular  occipital, acute encephalopathy    Plan of Care  Anticipated Discharge Date/Estimated Length of Stay: 7-10 days  Anticipated Discharge Destination: Community discharge with assistance  Discharge Plan : Pt plans to return home with his sons/DIL's helping at home.  Medical Necessity Expected Level Rationale: Good  Intensity and Duration: an average of 3 hours/5 days per week  Medical Supervision and 24 Hour Rehab Nursing: x  Physical Therapy: x  PT Intensity/Duration: 1-1.5 hrs/day, 5-6 days/week  Occupational Therapy: x  OT Intensity/Duration: 1-1.5 hrs/day, 5-6 days/week  Speech and Language Therapy: x  SLP Intensity/Duration: 0.5-1 hr/day, 3-5 days/week  Social Work: x  Therapeutic Recreation: x  Updated (if changes indicated)  No changes to plan.    Based on the patient's medical and functional status, their prognosis and  expected level of functional improvement is: Good    Interdisciplinary Problem/Goals/Status  Copy from POCMobility    [PT] Bed/Chair/Wheelchair (Active)  Current Status (7/2/2021 12:00:00 AM): CGA  Weekly Goal: Sup  Discharge Goal: Sup    [PT] Walk (Active)  Current Status (7/2/2021 12:00:00 AM): amb 160' RW CGA  Weekly Goal: amb 300' RW Sup  Discharge Goal: amb 300' RW Sup    Self Care    [OT] Toileting (Active)  Current Status (7/2/2021 12:00:00 AM): mod  Weekly Goal: min/cga  Discharge Goal: sup    Pain    [RN] Pain Management (Active)  Current Status (7/1/2021 5:54:00 PM): Potential for pain  Weekly Goal: No pain this week  Discharge Goal: No pain    Safety    [RN] Potential for Injury (Active)  Current Status (7/1/2021 5:54:00 PM): At risk for injury  Weekly Goal: No injury this week  Discharge Goal: No injury    Medical  Problems    Comments:    Signed by: Chance Underwood, Physician

## 2021-07-03 NOTE — PLAN OF CARE
Problem: Communication Impairment  Goal: Effective Communication Skills  Outcome: Ongoing, Progressing   Goal Outcome Evaluation:

## 2021-07-03 NOTE — PROGRESS NOTES
[facility] REHAB PROGRESS NOTE     Patient Identification:  Name:  Chance Lopez  Age:  65 y.o.  Sex:  male  :  1955  MRN:  [id number]  Visit Number:  [id number]  ROOM: [facility]     Primary Care Provider:  Ramos Gurrola MD    Length of stay in inpatient status:  2    Subjective     Chief Compliant:  No chief complaint on file.      History of Presenting Illness: Patient 65-year-old gentleman who is been transferred to our service from [facility] after CVA involving bihemispheric MCA distribution.  Patient did have an acute encephalopathy and is mental status is improving.  Patient has no new complaints at this time patient anxious to continue his therapy at this time.    Objective     Current Hospital Meds:aspirin, 81 mg, Oral, Daily  atorvastatin, 40 mg, Oral, Nightly  carvedilol, 12.5 mg, Oral, BID With Meals  cetirizine, 10 mg, Oral, Daily  clopidogrel, 75 mg, Oral, Daily  enoxaparin, 40 mg, Subcutaneous, Q24H  fluticasone, 2 spray, Each Nare, Daily  insulin aspart, 7 Units, Subcutaneous, TID With Meals  insulin detemir, 8 Units, Subcutaneous, Nightly  multivitamin, 1 tablet, Oral, Daily  OLANZapine, 5 mg, Oral, Nightly  PARoxetine, 20 mg, Oral, Daily       ----------------------------------------------------------------------------------------------------------------------  Vital Signs:  Temp:  [98.7 °F (37.1 °C)-99 °F (37.2 °C)] 98.7 °F (37.1 °C)  Heart Rate:  [66-78] 72  Resp:  [16-18] 16  BP: (142-146)/(68-86) 142/68  SpO2:  [97 %] 97 %  on   ;   Device (Oxygen Therapy): room air  Body mass index is 24.39 kg/m².    Wt Readings from Last 3 Encounters:   21 79.3 kg (174 lb 13.2 oz)   19 83.6 kg (184 lb 3.2 oz)   18 83.9 kg (184 lb 15.5 oz)     Intake & Output (last 3 days)       701 -  07 -  07 07 07 -  0700    P.O.  200 1260 360    I.V. (mL/kg)  100 (1.3)      Total Intake(mL/kg)  300 (3.8) 1260  (15.9) 360 (4.5)    Urine (mL/kg/hr)  200      Total Output  200      Net  +100 +1260 +360            Urine Unmeasured Occurrence  5 x 9 x         Diet Soft Texture; Chopped; Thin; Consistent Carbohydrate  ----------------------------------------------------------------------------------------------------------------------  Physical exam:  Constitutional:   No acute distress  HEENT: Normocephalic atraumatic  Neck: Supple   Cardiovascular: Regular rate and rhythm  Pulmonary/Chest: Clear to auscultation  Abdominal: Positive bowel sounds soft.   Musculoskeletal: No arthropathy  Neurological: No focal deficits; slightly slowed mentation  Skin: No rash  Peripheral vascular:  Genitourinary:  ----------------------------------------------------------------------------------------------------------------------    Last echocardiogram:    ----------------------------------------------------------------------------------------------------------------------  Results from last 7 days   Lab Units 07/02/21  0206   WBC 10*3/mm3 5.12   HEMOGLOBIN g/dL 11.3*   HEMATOCRIT % 34.3*   MCV fL 99.7*   MCHC g/dL 32.9   PLATELETS 10*3/mm3 126*         Results from last 7 days   Lab Units 07/03/21  0111 07/02/21  0206   SODIUM mmol/L  --  141   POTASSIUM mmol/L  --  4.2   MAGNESIUM mg/dL 2.1 1.6   CHLORIDE mmol/L  --  105   CO2 mmol/L  --  26.0   BUN mg/dL  --  15   CREATININE mg/dL  --  1.45*   EGFR IF NONAFRICN AM mL/min/1.73  --  49*   CALCIUM mg/dL  --  9.2   GLUCOSE mg/dL  --  180*   ALBUMIN g/dL  --  3.44*   BILIRUBIN mg/dL  --  0.4   ALK PHOS U/L  --  90   AST (SGOT) U/L  --  27   ALT (SGPT) U/L  --  35   Estimated Creatinine Clearance: 57 mL/min (A) (by C-G formula based on SCr of 1.45 mg/dL (H)).  No results found for: AMMONIA              Glucose   Date/Time Value Ref Range Status   07/03/2021 0607 186 (H) 70 - 130 mg/dL Final     Comment:     Meter: [id number] : [id number] [place]up Lynne   07/02/2021 2016 147 (H) 70 - 130  mg/dL Final     Comment:     Meter: [id number] : [id number] Bobby DAVILA   07/02/2021 0601 188 (H) 70 - 130 mg/dL Final     Comment:     Meter: [id number] : [id number] Veronica Batista   07/01/2021 1957 239 (H) 70 - 130 mg/dL Final     Comment:     Meter: [id number] : [id number] Bobby DAVILA   07/01/2021 1845 156 (H) 70 - 130 mg/dL Final     Comment:     Meter: [id number] : [id number] monhollin heather     No results found for: TSH, FREET4  No results found for: PREGTESTUR, PREGSERUM, HCG, HCGQUANT  Pain Management Panel    There is no flowsheet data to display.       Brief Urine Lab Results     None        No results found for: BLOODCX      No results found for: URINECX  No results found for: WOUNDCX  No results found for: STOOLCX        I have personally looked at the labs and they are summarized above.  ----------------------------------------------------------------------------------------------------------------------  Detailed radiology reports for the last 24 hours:    Imaging Results (Last 24 Hours)     ** No results found for the last 24 hours. **        Final impressions for the last 30 days of radiology reports:    No radiology results for the last 30 days.  I have personally looked at the radiology images and read the final radiology report.    Assessment & Plan    Status post CVA with bihemispheric MCA infarctions--patient required contact-guard for help with transfers; ambulated 160 feet x 2 twice yesterday with a front wheel walker.  Did require moderate assistance for bathing; minimum assistance for upper body dressing; moderate assistance for lower body dressing; supervision for grooming; moderate assistance for toileting.  Patient DC speech therapy yesterday for cognitive language evaluation as well as evaluation for dysphagia and and recommendation was mechanical soft solids with chopped meats and thin liquids.    Diabetes mellitus reasonably well-controlled.    Acute kidney  injury--recheck BMP in the a.m.    Hypertension continue Coreg 12.5 mg twice daily    Acute encephalopathy--improving.  Continue Zyprexa and Paxil    VTE Prophylaxis:   Mechanical Order History:     None      Pharmalogical Order History:      Ordered     Dose Route Frequency Stop    07/01/21 4539  enoxaparin (LOVENOX) syringe 40 mg      40 mg SC Every 24 Hours --                  Chance Underwood MD  [facility]  07/03/21  09:12 EDT

## 2021-07-03 NOTE — THERAPY TREATMENT NOTE
Inpatient Rehabilitation - Occupational Therapy Treatment Note     [place]     Patient Name: Chance Lopez  : 1955  MRN: [id number]    Today's Date: 7/3/2021                 Admit Date: 2021       No diagnosis found.    Patient Active Problem List   Diagnosis   • Colon cancer screening   • Gastritis   • CVA (cerebral vascular accident) (CMS/HCC)       Past Medical History:   Diagnosis Date   • Diabetes mellitus (CMS/HCC)    • Diarrhea    • Full dentures     INSTRUCTED NO ADHESIVES THE DOS. REPORTS USUALLY ONLY WEARS UPPER PLATE.   • Hearing loss     REPORTS MORE LOSS ON LEFT SIDE BUT THAT HE HAS BILATERAL LOSS. NO USE OF HEARING AIDS.   • Hepatitis     REPORTS AS A CHILD.  UNSURE IF TYPE A OR B.   • History of acute pancreatitis     REPORTS PRIOR TO    • History of gout    • Hypertension    • Seasonal allergies    • Stroke (CMS/HCC)    • Wears glasses     RX GLASSES       Past Surgical History:   Procedure Laterality Date   • COLONOSCOPY N/A 2018    Procedure: COLONOSCOPY;  Surgeon: Ha Sykes MD;  Location: [facility] ENDOSCOPY;  Service: Gastroenterology   • ENDOSCOPY N/A 2018    Procedure: ESOPHAGOGASTRODUODENOSCOPY with cold forcep biopsy;  Surgeon: Ha Sykes MD;  Location: [facility] ENDOSCOPY;  Service: Gastroenterology   • TOOTH EXTRACTION                  IRF OT ASSESSMENT FLOWSHEET (last 12 hours)      IRF OT Evaluation and Treatment     Row Name 21 1349          OT Time and Intention    Document Type  daily treatment  -KR     Mode of Treatment  occupational therapy  -KR     Patient Effort  good  -KR     Row Name 21 1349          Cognition/Psychosocial    Affect/Mental Status (Cognitive)  WFL  -KR     Follows Commands (Cognition)  increased processing time needed;initiation impaired  -KR     Row Name 21 1349          Transfers    Bed-Chair [place] (Transfers)  minimum assist (75% patient effort)  -KR     Chair-Bed [place] (Transfers)  minimum assist  (75% patient effort)  -KR     Row Name 07/03/21 1349          Motor Skills    Therapeutic Exercise  shoulder;elbow/forearm;hand  -KR     Row Name 07/03/21 1349          Shoulder (Therapeutic Exercise)    Shoulder (Therapeutic Exercise)  AROM (active range of motion);strengthening exercise;wand exercises  -KR     Shoulder Strengthening (Therapeutic Exercise)  bilateral  -KR     Row Name 07/03/21 1349          Elbow/Forearm (Therapeutic Exercise)    Elbow/Forearm (Therapeutic Exercise)  AROM (active range of motion);strengthening exercise  -KR     Row Name 07/03/21 1349          Hand (Therapeutic Exercise)    Hand (Therapeutic Exercise)  AROM (active range of motion);strengthening exercise [facility] BUE  -KR     Row Name 07/03/21 1349          Self-Feeding    [place] (Self-Feeding)  feeding skills;set up  -       User Key  (r) = Recorded By, (t) = Taken By, (c) = Cosigned By    Initials Name Effective Dates    Andre Tolbert, OT 06/16/21 -            Occupational Therapy Education                 Title: PT OT SLP Therapies (In Progress)     Topic: Occupational Therapy (Not Started)     Point: ADL training (Not Started)     Description:   Instruct learner(s) on proper safety adaptation and remediation techniques during self care or transfers.   Instruct in proper use of assistive devices.              Learner Progress:  Not documented in this visit.          Point: Home exercise program (Not Started)     Description:   Instruct learner(s) on appropriate technique for monitoring, assisting and/or progressing therapeutic exercises/activities.              Learner Progress:  Not documented in this visit.          Point: Precautions (Not Started)     Description:   Instruct learner(s) on prescribed precautions during self-care and functional transfers.              Learner Progress:  Not documented in this visit.          Point: Body mechanics (Not Started)     Description:   Instruct learner(s) on proper  positioning and spine alignment during self-care, functional mobility activities and/or exercises.              Learner Progress:  Not documented in this visit.                                OT Recommendation and Plan                         Time Calculation:     Time Calculation- OT     Row Name 07/03/21 1353             Time Calculation- OT    OT Start Time  1245  -KR      OT Stop Time  1415  -KR      OT Time Calculation (min)  90 min  -KR      Total Timed Code Minutes- OT  90 minute(s)  -KR        User Key  (r) = Recorded By, (t) = Taken By, (c) = Cosigned By    Initials Name Provider Type    Andre Tolbert OT Occupational Therapist        Therapy Charges for Today     Code Description Service Date Service Provider Modifiers Qty    40219657255 HC OT SELF CARE/MGMT/TRAIN EA 15 MIN 7/3/2021 Andre Navas OT GO 2    87373789447 HC OT THERAPEUTIC ACT EA 15 MIN 7/3/2021 Andre Navas OT GO 4                   Andre Navas OT  7/3/2021

## 2021-07-03 NOTE — PROGRESS NOTES
Occupational Therapy:    Physical Therapy: Individual: 90 minutes.    Speech Language Pathology:    Signed by: Linda Rodríguez, Physical Therapist

## 2021-07-04 LAB
ANION GAP SERPL CALCULATED.3IONS-SCNC: 9.6 MMOL/L (ref 5–15)
BUN SERPL-MCNC: 20 MG/DL (ref 8–23)
BUN/CREAT SERPL: 13.9 (ref 7–25)
CALCIUM SPEC-SCNC: 9.4 MG/DL (ref 8.6–10.5)
CHLORIDE SERPL-SCNC: 101 MMOL/L (ref 98–107)
CO2 SERPL-SCNC: 27.4 MMOL/L (ref 22–29)
CREAT SERPL-MCNC: 1.44 MG/DL (ref 0.76–1.27)
GFR SERPL CREATININE-BSD FRML MDRD: 49 ML/MIN/1.73
GLUCOSE BLDC GLUCOMTR-MCNC: 218 MG/DL (ref 70–130)
GLUCOSE BLDC GLUCOMTR-MCNC: 243 MG/DL (ref 70–130)
GLUCOSE BLDC GLUCOMTR-MCNC: 276 MG/DL (ref 70–130)
GLUCOSE BLDC GLUCOMTR-MCNC: 296 MG/DL (ref 70–130)
GLUCOSE BLDC GLUCOMTR-MCNC: 329 MG/DL (ref 70–130)
GLUCOSE SERPL-MCNC: 304 MG/DL (ref 65–99)
POTASSIUM SERPL-SCNC: 4.8 MMOL/L (ref 3.5–5.2)
SODIUM SERPL-SCNC: 138 MMOL/L (ref 136–145)

## 2021-07-04 PROCEDURE — 82962 GLUCOSE BLOOD TEST: CPT

## 2021-07-04 PROCEDURE — 99232 SBSQ HOSP IP/OBS MODERATE 35: CPT | Performed by: FAMILY MEDICINE

## 2021-07-04 PROCEDURE — 80048 BASIC METABOLIC PNL TOTAL CA: CPT | Performed by: FAMILY MEDICINE

## 2021-07-04 PROCEDURE — 63710000001 INSULIN DETEMIR PER 5 UNITS: Performed by: FAMILY MEDICINE

## 2021-07-04 PROCEDURE — 25010000002 ENOXAPARIN PER 10 MG: Performed by: FAMILY MEDICINE

## 2021-07-04 PROCEDURE — 63710000001 INSULIN ASPART PER 5 UNITS: Performed by: FAMILY MEDICINE

## 2021-07-04 RX ADMIN — CETIRIZINE HYDROCHLORIDE 10 MG: 10 TABLET, FILM COATED ORAL at 10:35

## 2021-07-04 RX ADMIN — INSULIN ASPART 10 UNITS: 100 INJECTION, SOLUTION INTRAVENOUS; SUBCUTANEOUS at 12:03

## 2021-07-04 RX ADMIN — MAGNESIUM HYDROXIDE 15 ML: 2400 SUSPENSION ORAL at 21:28

## 2021-07-04 RX ADMIN — INSULIN ASPART 10 UNITS: 100 INJECTION, SOLUTION INTRAVENOUS; SUBCUTANEOUS at 17:56

## 2021-07-04 RX ADMIN — INSULIN ASPART 7 UNITS: 100 INJECTION, SOLUTION INTRAVENOUS; SUBCUTANEOUS at 07:50

## 2021-07-04 RX ADMIN — OLANZAPINE 5 MG: 5 TABLET, FILM COATED ORAL at 21:29

## 2021-07-04 RX ADMIN — ASPIRIN 81 MG: 81 TABLET, COATED ORAL at 10:35

## 2021-07-04 RX ADMIN — PAROXETINE HYDROCHLORIDE 20 MG: 20 TABLET, FILM COATED ORAL at 10:35

## 2021-07-04 RX ADMIN — INSULIN DETEMIR 12 UNITS: 100 INJECTION, SOLUTION SUBCUTANEOUS at 21:46

## 2021-07-04 RX ADMIN — CARVEDILOL 12.5 MG: 6.25 TABLET, FILM COATED ORAL at 10:35

## 2021-07-04 RX ADMIN — CARVEDILOL 12.5 MG: 6.25 TABLET, FILM COATED ORAL at 17:54

## 2021-07-04 RX ADMIN — FLUTICASONE PROPIONATE 2 SPRAY: 50 SPRAY, METERED NASAL at 10:35

## 2021-07-04 RX ADMIN — ENOXAPARIN SODIUM 40 MG: 40 INJECTION SUBCUTANEOUS at 21:28

## 2021-07-04 RX ADMIN — CLOPIDOGREL 75 MG: 75 TABLET, FILM COATED ORAL at 10:35

## 2021-07-04 RX ADMIN — Medication 1 TABLET: at 10:35

## 2021-07-04 RX ADMIN — ATORVASTATIN CALCIUM 40 MG: 40 TABLET, FILM COATED ORAL at 21:29

## 2021-07-04 NOTE — PROGRESS NOTES
[facility] REHAB PROGRESS NOTE     Patient Identification:  Name:  Chance Lopez  Age:  65 y.o.  Sex:  male  :  1955  MRN:  [id number]  Visit Number:  [id number]  ROOM: [facility]     Primary Care Provider:  Ramos Gurrola MD    Length of stay in inpatient status:  3    Subjective     Chief Compliant:  No chief complaint on file.      History of Presenting Illness: 65-year-old gentleman who was recently transferred from [facility] after CVA involving the bihemispheric MCA distribution.  Patient did have acute encephalopathy and mental status does seem to be improving.  Patient moving all extremities at this time and is participating PT and OT yesterday.  Patient's glucose is elevated over the last 24 hours.  No new complaints otherwise    Objective     Current Hospital Meds:aspirin, 81 mg, Oral, Daily  atorvastatin, 40 mg, Oral, Nightly  carvedilol, 12.5 mg, Oral, BID With Meals  cetirizine, 10 mg, Oral, Daily  clopidogrel, 75 mg, Oral, Daily  enoxaparin, 40 mg, Subcutaneous, Q24H  fluticasone, 2 spray, Each Nare, Daily  insulin aspart, 10 Units, Subcutaneous, TID With Meals  insulin detemir, 12 Units, Subcutaneous, Nightly  multivitamin, 1 tablet, Oral, Daily  OLANZapine, 5 mg, Oral, Nightly  PARoxetine, 20 mg, Oral, Daily       ----------------------------------------------------------------------------------------------------------------------  Vital Signs:  Temp:  [98.3 °F (36.8 °C)] 98.3 °F (36.8 °C)  Heart Rate:  [66-69] 66  Resp:  [20] 20  BP: (131-152)/(78-88) 152/88  SpO2:  [99 %] 99 %  on   ;   Device (Oxygen Therapy): room air  Body mass index is 24.39 kg/m².    Wt Readings from Last 3 Encounters:   21 79.3 kg (174 lb 13.2 oz)   19 83.6 kg (184 lb 3.2 oz)   18 83.9 kg (184 lb 15.5 oz)     Intake & Output (last 3 days)       701 -  07 -  07 -  07 07 -  0700    P.O. 200 1260 1440 360    I.V.  (mL/kg) 100 (1.3)       Total Intake(mL/kg) 300 (3.8) 1260 (15.9) 1440 (18.2) 360 (4.5)    Urine (mL/kg/hr) 200  200 (0.1)     Total Output 200  200     Net +100 +1260 +1240 +360            Urine Unmeasured Occurrence 5 x 9 x 6 x         Diet Soft Texture; Chopped; Thin; Consistent Carbohydrate  ----------------------------------------------------------------------------------------------------------------------  Physical exam:  Constitutional:   No acute distress  HEENT: Normocephalic atraumatic  Neck:    Supple  Cardiovascular: Regular rate and rhythm  Pulmonary/Chest: Clear to auscultation  Abdominal: Positive bowel sounds soft.   Musculoskeletal: No arthropathy  Neurological: No focal deficits; mild confusion  Skin: No rash  Peripheral vascular:  Genitourinary:  ----------------------------------------------------------------------------------------------------------------------    Last echocardiogram:    ----------------------------------------------------------------------------------------------------------------------  Results from last 7 days   Lab Units 07/02/21  0206   WBC 10*3/mm3 5.12   HEMOGLOBIN g/dL 11.3*   HEMATOCRIT % 34.3*   MCV fL 99.7*   MCHC g/dL 32.9   PLATELETS 10*3/mm3 126*         Results from last 7 days   Lab Units 07/04/21  0117 07/03/21  0111 07/02/21  0206   SODIUM mmol/L 138  --  141   POTASSIUM mmol/L 4.8  --  4.2   MAGNESIUM mg/dL  --  2.1 1.6   CHLORIDE mmol/L 101  --  105   CO2 mmol/L 27.4  --  26.0   BUN mg/dL 20  --  15   CREATININE mg/dL 1.44*  --  1.45*   EGFR IF NONAFRICN AM mL/min/1.73 49*  --  49*   CALCIUM mg/dL 9.4  --  9.2   GLUCOSE mg/dL 304*  --  180*   ALBUMIN g/dL  --   --  3.44*   BILIRUBIN mg/dL  --   --  0.4   ALK PHOS U/L  --   --  90   AST (SGOT) U/L  --   --  27   ALT (SGPT) U/L  --   --  35   Estimated Creatinine Clearance: 57.4 mL/min (A) (by C-G formula based on SCr of 1.44 mg/dL (H)).  No results found for: AMMONIA              Glucose   Date/Time Value Ref  Range Status   07/04/2021 0614 276 (H) 70 - 130 mg/dL Final     Comment:     Meter: [id number] : [id number] Laron Crook   07/03/2021 1950 259 (H) 70 - 130 mg/dL Final     Comment:     Meter: [id number] : [id number] Laron Crook   07/03/2021 1601 278 (H) 70 - 130 mg/dL Final     Comment:     Meter: [id number] : [id number] kev wisdom   07/03/2021 1051 306 (H) 70 - 130 mg/dL Final     Comment:     RN Notified Meter: [id number] : [id number] NAN THOMAS   07/03/2021 0607 186 (H) 70 - 130 mg/dL Final     Comment:     Meter: [id number] : [id number] Veronica Lynne   07/02/2021 2016 147 (H) 70 - 130 mg/dL Final     Comment:     Meter: [id number] : [id number] Bobby DAVILA   07/02/2021 0601 188 (H) 70 - 130 mg/dL Final     Comment:     Meter: [id number] : [id number] DealBird   07/01/2021 1957 239 (H) 70 - 130 mg/dL Final     Comment:     Meter: [id number] : [id number] Bobby Lawrence      No results found for: TSH, FREET4  No results found for: PREGTESTUR, PREGSERUM, HCG, HCGQUANT  Pain Management Panel    There is no flowsheet data to display.       Brief Urine Lab Results     None        No results found for: BLOODCX      No results found for: URINECX  No results found for: WOUNDCX  No results found for: STOOLCX        I have personally looked at the labs and they are summarized above.  ----------------------------------------------------------------------------------------------------------------------  Detailed radiology reports for the last 24 hours:    Imaging Results (Last 24 Hours)     ** No results found for the last 24 hours. **        Final impressions for the last 30 days of radiology reports:    No radiology results for the last 30 days.  I have personally looked at the radiology images and read the final radiology report.    Assessment & Plan    Status post CVA with bihemispheric MCA infarctions--patient requiring minimum assistance for help with bed  to chair and chair to bed independence; contact-guard for sit to stand and stand to sit independence.  Ambulated 80 feet x 2 with front wheel walker and contact-guard yesterday.  Patient did work on therapeutic exercises with the shoulder upper extremity and hand yesterday.  Feeding skills were also worked on yesterday.  Patient also spent session with speech therapy yesterday to work on cognitive evaluation speech language.    Diabetes mellitus--we will increase Levemir to 12 units nightly and will place on NovoLog 10 units before meals.  Will follow    Acute kidney injury--creatinine has been stable at 1.4    Hypertension continue Coreg 12.5 mg twice daily    Recent issues with acute encephalopathy--seems to be improved.  Patient currently on Paxil and Zyprexa    VTE Prophylaxis:   Mechanical Order History:     None      Pharmalogical Order History:      Ordered     Dose Route Frequency Stop    07/01/21 9973  enoxaparin (LOVENOX) syringe 40 mg      40 mg SC Every 24 Hours --                    Chance Underwood MD  [facility] Hospitalist  07/04/21  09:01 EDT

## 2021-07-04 NOTE — PLAN OF CARE
Goal Outcome Evaluation:               Pt is progressing medically and physically with all therapies, continue with current plan of care.

## 2021-07-04 NOTE — PROGRESS NOTES
Rehabilitation Nursing  Inpatient Rehabilitation Plan of Care Note    Plan of Care  Copy from POC    Pain    Pain Management (Active)  Current Status (7/1/2021 5:54:00 PM): Potential for pain  Weekly Goal: No pain this week  Discharge Goal: No pain    Safety    Potential for Injury (Active)  Current Status (7/1/2021 5:54:00 PM): At risk for injury  Weekly Goal: No injury this week  Discharge Goal: No injury    RN Interventions    Pain -  RN: Assess pain Q shift and PRN..meds per MD order..Call bell in reach [RN]    Safety -  RN: Assess safety Q 1 hour and PRN..Meds per MD order..Call bell and items in  reach..Side railos up Xs2 [RN]    Signed by: Nurse Tristan

## 2021-07-04 NOTE — PLAN OF CARE
Goal Outcome Evaluation:  Plan of Care Reviewed With: patient        Progress: improving  Outcome Summary: patient resting in bed. Medically stable and no complaints at this time. continue plan of care.

## 2021-07-05 LAB
GLUCOSE BLDC GLUCOMTR-MCNC: 139 MG/DL (ref 70–130)
GLUCOSE BLDC GLUCOMTR-MCNC: 159 MG/DL (ref 70–130)
GLUCOSE BLDC GLUCOMTR-MCNC: 207 MG/DL (ref 70–130)
GLUCOSE BLDC GLUCOMTR-MCNC: 226 MG/DL (ref 70–130)

## 2021-07-05 PROCEDURE — 97116 GAIT TRAINING THERAPY: CPT

## 2021-07-05 PROCEDURE — 97530 THERAPEUTIC ACTIVITIES: CPT

## 2021-07-05 PROCEDURE — 97535 SELF CARE MNGMENT TRAINING: CPT | Performed by: OCCUPATIONAL THERAPIST

## 2021-07-05 PROCEDURE — 97110 THERAPEUTIC EXERCISES: CPT | Performed by: OCCUPATIONAL THERAPIST

## 2021-07-05 PROCEDURE — 92507 TX SP LANG VOICE COMM INDIV: CPT

## 2021-07-05 PROCEDURE — 63710000001 INSULIN ASPART PER 5 UNITS: Performed by: FAMILY MEDICINE

## 2021-07-05 PROCEDURE — 97112 NEUROMUSCULAR REEDUCATION: CPT | Performed by: OCCUPATIONAL THERAPIST

## 2021-07-05 PROCEDURE — 63710000001 INSULIN DETEMIR PER 5 UNITS: Performed by: INTERNAL MEDICINE

## 2021-07-05 PROCEDURE — 97530 THERAPEUTIC ACTIVITIES: CPT | Performed by: OCCUPATIONAL THERAPIST

## 2021-07-05 PROCEDURE — 82962 GLUCOSE BLOOD TEST: CPT

## 2021-07-05 PROCEDURE — 25010000002 ENOXAPARIN PER 10 MG: Performed by: FAMILY MEDICINE

## 2021-07-05 PROCEDURE — 97110 THERAPEUTIC EXERCISES: CPT

## 2021-07-05 RX ORDER — AMOXICILLIN 250 MG
2 CAPSULE ORAL 2 TIMES DAILY
Status: DISCONTINUED | OUTPATIENT
Start: 2021-07-05 | End: 2021-07-15 | Stop reason: HOSPADM

## 2021-07-05 RX ADMIN — OLANZAPINE 5 MG: 5 TABLET, FILM COATED ORAL at 21:09

## 2021-07-05 RX ADMIN — DOCUSATE SODIUM 50 MG AND SENNOSIDES 8.6 MG 2 TABLET: 8.6; 5 TABLET, FILM COATED ORAL at 21:09

## 2021-07-05 RX ADMIN — DOCUSATE SODIUM 50 MG AND SENNOSIDES 8.6 MG 2 TABLET: 8.6; 5 TABLET, FILM COATED ORAL at 08:01

## 2021-07-05 RX ADMIN — MAGNESIUM HYDROXIDE 15 ML: 2400 SUSPENSION ORAL at 21:09

## 2021-07-05 RX ADMIN — CARVEDILOL 12.5 MG: 6.25 TABLET, FILM COATED ORAL at 07:41

## 2021-07-05 RX ADMIN — CLOPIDOGREL 75 MG: 75 TABLET, FILM COATED ORAL at 08:02

## 2021-07-05 RX ADMIN — INSULIN DETEMIR 15 UNITS: 100 INJECTION, SOLUTION SUBCUTANEOUS at 21:51

## 2021-07-05 RX ADMIN — FLUTICASONE PROPIONATE 2 SPRAY: 50 SPRAY, METERED NASAL at 08:01

## 2021-07-05 RX ADMIN — CARVEDILOL 12.5 MG: 6.25 TABLET, FILM COATED ORAL at 17:28

## 2021-07-05 RX ADMIN — ENOXAPARIN SODIUM 40 MG: 40 INJECTION SUBCUTANEOUS at 21:09

## 2021-07-05 RX ADMIN — CETIRIZINE HYDROCHLORIDE 10 MG: 10 TABLET, FILM COATED ORAL at 08:02

## 2021-07-05 RX ADMIN — INSULIN ASPART 10 UNITS: 100 INJECTION, SOLUTION INTRAVENOUS; SUBCUTANEOUS at 12:46

## 2021-07-05 RX ADMIN — ATORVASTATIN CALCIUM 40 MG: 40 TABLET, FILM COATED ORAL at 21:09

## 2021-07-05 RX ADMIN — PAROXETINE HYDROCHLORIDE 20 MG: 20 TABLET, FILM COATED ORAL at 08:01

## 2021-07-05 RX ADMIN — INSULIN ASPART 10 UNITS: 100 INJECTION, SOLUTION INTRAVENOUS; SUBCUTANEOUS at 07:43

## 2021-07-05 RX ADMIN — ASPIRIN 81 MG: 81 TABLET, COATED ORAL at 08:02

## 2021-07-05 RX ADMIN — INSULIN ASPART 10 UNITS: 100 INJECTION, SOLUTION INTRAVENOUS; SUBCUTANEOUS at 17:28

## 2021-07-05 RX ADMIN — Medication 1 TABLET: at 08:02

## 2021-07-05 NOTE — PROGRESS NOTES
Rehabilitation Nursing  Inpatient Rehabilitation Plan of Care Note    Plan of Care  Copy from Suzette    Pain Management (Active)  Current Status (7/1/2021 5:54:00 PM): Potential for pain  Weekly Goal: No pain this week  Discharge Goal: No pain    Safety    Potential for Injury (Active)  Current Status (7/1/2021 5:54:00 PM): At risk for injury  Weekly Goal: No injury this week  Discharge Goal: No injury    Signed by: Elizabeth Marinelli, Nurse

## 2021-07-05 NOTE — PROGRESS NOTES
Occupational Therapy:    Physical Therapy: Individual: 75 minutes.    Speech Language Pathology:    Signed by: Nuris Mccollum PTA

## 2021-07-05 NOTE — THERAPY TREATMENT NOTE
Inpatient Rehabilitation - Speech Language Pathology Treatment Note  [facility]     Patient Name: Chance Lopez  : 1955  MRN: [id number]  Today's Date: 2021             Admit Date: 2021     SPEECH LANGUAGE THERAPY PLAN OF CARE:     Chance Lopez was seen this am in the speech therapy office for speech language therapy.  He is pleasant and cooperative throughout session.  Pt is easily distracted and requires cues to reorient to task.  Pt often neglects pictures/items located on R side of visual field.  Requires prompts/cues to turn head to locate.      Long Term Goal:  Pt will accept least restrictive diet tolerance w/o overt s/s aspiration.      Short Term Goals:  1. Pt will recall various pictures/items to increase STM in 3/5 opp over 3 consecutive sessions w/ min cues.   * 2/4 w/ mod cues.      2. Pt will recall personal LTM information in 3/5 opp over 3 consecutive sessions w/ min cues.   *2/6 w/ mod cues (career, grandchildren names, children career)     3. Pt will complete following multi-step directives tasks related to adls in 3/5 opp over 3 consecutive sessions w/ min cues.   * Not specifically addressed on this date.      4. Pt will perform sequencing tasks related to adls in 3/5 opp over 3 consecutive sessions w/ min cues.   * Not specifically addressed on this date.      5. Pt will perform convergent naming tasks w/ min cues in 3/5 opp over 3 consecutive sessions.   * 2/6 w/ mod-max cues     6. Pt will perform divergent naming tasks w/ min cues in 3/5 opp over 3 consecutive sessions.   * 3 items names in approx 3 min w/ mod-max cues     7. Pt will perform graphic tasks related to adls w/ min cues in 3/5 opp over 3 consecutive sessions.   * pt writes name w/ min cues.      8. Pt will demonstrate topic maintenance across session w/ min cues in 3/5 opp over 3 consecutive sessions.     9. Pt will label items/pictures presented in 3/5 opp to decrease anomia w/ min cues over 3 consecutive sessions.   *  9/12 pictures w/ mod cues      Thank you-  Claribel Lovell M.S., CFY-SLP    Visit Dx:  No diagnosis found.  Patient Active Problem List   Diagnosis   • Colon cancer screening   • Gastritis   • CVA (cerebral vascular accident) (CMS/HCC)     Past Medical History:   Diagnosis Date   • Diabetes mellitus (CMS/HCC)    • Diarrhea    • Full dentures     INSTRUCTED NO ADHESIVES THE DOS. REPORTS USUALLY ONLY WEARS UPPER PLATE.   • Hearing loss     REPORTS MORE LOSS ON LEFT SIDE BUT THAT HE HAS BILATERAL LOSS. NO USE OF HEARING AIDS.   • Hepatitis     REPORTS AS A CHILD.  UNSURE IF TYPE A OR B.   • History of acute pancreatitis     REPORTS PRIOR TO 2000   • History of gout    • Hypertension    • Seasonal allergies    • Stroke (CMS/HCC)    • Wears glasses     RX GLASSES     Past Surgical History:   Procedure Laterality Date   • COLONOSCOPY N/A 11/26/2018    Procedure: COLONOSCOPY;  Surgeon: Ha Sykes MD;  Location: [facility] ENDOSCOPY;  Service: Gastroenterology   • ENDOSCOPY N/A 11/26/2018    Procedure: ESOPHAGOGASTRODUODENOSCOPY with cold forcep biopsy;  Surgeon: Ha Sykes MD;  Location: [facility] ENDOSCOPY;  Service: Gastroenterology   • TOOTH EXTRACTION              EDUCATION  The patient has been educated in the following areas:     Cognitive Impairment Communication Impairment.    SLP Recommendation and Plan                                                              Time Calculation:     Time Calculation- SLP     Row Name 07/05/21 1611             Time Calculation- SLP    SLP Start Time  1008  -      SLP Stop Time  1048  -      SLP Time Calculation (min)  40 min  -      SLP - Next Appointment  07/06/21  -        User Key  (r) = Recorded By, (t) = Taken By, (c) = Cosigned By    Initials Name Provider Type    Claribel Correa, MS, CFY-SLP Speech and Language Pathologist          Therapy Charges for Today     Code Description Service Date Service Provider Modifiers Qty    86473696618 [facility] TREATMENT SPEECH 3  7/5/2021 Claribel Lovell, MS, CFY-SLP GN 1                     Claribel Lovell MS, CFY-SLP  7/5/2021

## 2021-07-05 NOTE — PLAN OF CARE
SPEECH LANGUAGE THERAPY PLAN OF CARE:    Chance Lopez will benefit from formal speech-language therapy  X3-5 days per week at 15-60 minutes sessions, as functionally tolerated, for 7-14 Days, to address:    Long Term Goal:  Pt will accept least restrictive diet tolerance w/o overt s/s aspiration.     Short Term Goals:  1. Pt will recall various pictures/items to increase STM in 3/5 opp over 3 consecutive sessions w/ min cues.      2. Pt will recall personal LTM information in 3/5 opp over 3 consecutive sessions w/ min cues.     3. Pt will complete following multi-step directives tasks related to adls in 3/5 opp over 3 consecutive sessions w/ min cues.     4. Pt will perform sequencing tasks related to adls in 3/5 opp over 3 consecutive sessions w/ min cues.     5. Pt will perform convergent naming tasks w/ min cues in 3/5 opp over 3 consecutive sessions.     6. Pt will perform divergent naming tasks w/ min cues in 3/5 opp over 3 consecutive sessions.     7. Pt will perform graphic tasks related to adls w/ min cues in 3/5 opp over 3 consecutive sessions.     8. Pt will demonstrate topic maintenance across session w/ min cues in 3/5 opp over 3 consecutive sessions.     Thank you-  Claribel Lovell M.S., CFY-SLP        Goal Outcome Evaluation:

## 2021-07-05 NOTE — PLAN OF CARE
Goal Outcome Evaluation:         Pt is progressing medically and physically with all therapies continue with current plan of care.

## 2021-07-05 NOTE — THERAPY TREATMENT NOTE
Inpatient Rehabilitation - Occupational Therapy Treatment Note     [facility]     Patient Name: Chance Lopez  : 1955  MRN: [id number]    Today's Date: 2021                 Admit Date: 2021       No diagnosis found.    Patient Active Problem List   Diagnosis   • Colon cancer screening   • Gastritis   • CVA (cerebral vascular accident) (CMS/HCC)       Past Medical History:   Diagnosis Date   • Diabetes mellitus (CMS/HCC)    • Diarrhea    • Full dentures     INSTRUCTED NO ADHESIVES THE DOS. REPORTS USUALLY ONLY WEARS UPPER PLATE.   • Hearing loss     REPORTS MORE LOSS ON LEFT SIDE BUT THAT HE HAS BILATERAL LOSS. NO USE OF HEARING AIDS.   • Hepatitis     REPORTS AS A CHILD.  UNSURE IF TYPE A OR B.   • History of acute pancreatitis     REPORTS PRIOR TO    • History of gout    • Hypertension    • Seasonal allergies    • Stroke (CMS/HCC)    • Wears glasses     RX GLASSES       Past Surgical History:   Procedure Laterality Date   • COLONOSCOPY N/A 2018    Procedure: COLONOSCOPY;  Surgeon: Ha Sykes MD;  Location: [facility] ENDOSCOPY;  Service: Gastroenterology   • ENDOSCOPY N/A 2018    Procedure: ESOPHAGOGASTRODUODENOSCOPY with cold forcep biopsy;  Surgeon: Ha Sykes MD;  Location: [facility] ENDOSCOPY;  Service: Gastroenterology   • TOOTH EXTRACTION                  IRF OT ASSESSMENT FLOWSHEET (last 12 hours)      IRF OT Evaluation and Treatment     Row Name 21 1534          OT Time and Intention    Document Type  daily treatment  -BF     Mode of Treatment  occupational therapy  -BF     Patient Effort  good  -BF     Symptoms Noted During/After Treatment  none  -BF     Row Name 21 1534          General Information    Existing Precautions/Restrictions  fall  -BF     Limitations/Impairments  safety/cognitive  -BF     Row Name 21 1534          Cognition/Psychosocial    Orientation Status (Cognition)  oriented to;person  -BF     Follows Commands (Cognition)  follows one-step  commands;verbal cues/prompting required;repetition of directions required;increased processing time needed  -BF     Row Name 07/05/21 1534          Transfers    Chair-Bed [place] (Transfers)  contact guard;verbal cues;nonverbal cues (demo/gesture)  -BF     Row Name 07/05/21 1534          Chair-Bed Transfer    Assistive Device (Chair-Bed Transfers)  wheelchair  -BF     Row Name 07/05/21 1534          Motor Skills    Motor Skills  motor control/coordination interventions  -BF     Motor Control/Coordination Interventions  gross motor coordination activities;fine motor manipulation/dexterity activities;therapeutic exercise/ROM BUE GMC/FMC theract; light strengthening  -BF     Gross Motor Skill, Impairments Detail  BUE PRE 3 mins X3  -BF     Row Name 07/05/21 1534          Lower Body Dressing    [place] Level (Lower Body Dressing)  minimum assist (75% patient effort);verbal cues;nonverbal cues (demo/gesture)  -BF     Position (Lower Body Dressing)  supported sitting  -BF     Comment (Lower Body Dressing)  Min A  -BF     Row Name 07/05/21 1534          Positioning and Restraints    Post Treatment Position  bed  -BF     In Bed  supine;call light within reach;encouraged to call for assist  -BF       User Key  (r) = Recorded By, (t) = Taken By, (c) = Cosigned By    Initials Name Effective Dates    BF Geno Mccollum OT 06/16/21 -            Occupational Therapy Education                 Title: PT OT SLP Therapies (In Progress)     Topic: Occupational Therapy (Not Started)     Point: ADL training (Not Started)     Description:   Instruct learner(s) on proper safety adaptation and remediation techniques during self care or transfers.   Instruct in proper use of assistive devices.              Learner Progress:  Not documented in this visit.          Point: Home exercise program (Not Started)     Description:   Instruct learner(s) on appropriate technique for monitoring, assisting and/or progressing  therapeutic exercises/activities.              Learner Progress:  Not documented in this visit.          Point: Precautions (Not Started)     Description:   Instruct learner(s) on prescribed precautions during self-care and functional transfers.              Learner Progress:  Not documented in this visit.          Point: Body mechanics (Not Started)     Description:   Instruct learner(s) on proper positioning and spine alignment during self-care, functional mobility activities and/or exercises.              Learner Progress:  Not documented in this visit.                                OT Recommendation and Plan                         Time Calculation:     Time Calculation- OT     Row Name 07/05/21 1538             Time Calculation- OT    OT Start Time  1330  -BF      OT Stop Time  1450  -BF      OT Time Calculation (min)  80 min  -BF      Total Timed Code Minutes- OT  80 minute(s)  -BF      OT Non-Billable Time (min)  20 min  -BF        User Key  (r) = Recorded By, (t) = Taken By, (c) = Cosigned By    Initials Name Provider Type    BF Geno Mccollum OT Occupational Therapist        Therapy Charges for Today     Code Description Service Date Service Provider Modifiers Qty    45772339487 HC OT SELF CARE/MGMT/TRAIN EA 15 MIN 7/5/2021 Geno Mccollum OT GO 1    39761801841 HC OT THER PROC EA 15 MIN 7/5/2021 Geno Mccollum OT GO 1    09194022806 HC OT NEUROMUSC RE EDUCATION EA 15 MIN 7/5/2021 Geno Mccollum OT GO 1    39787756663 HC OT THERAPEUTIC ACT EA 15 MIN 7/5/2021 Geno Mccollum OT GO 2                   Geno Mccollum OT  7/5/2021

## 2021-07-05 NOTE — THERAPY TREATMENT NOTE
Inpatient Rehabilitation - Physical Therapy Treatment Note        [facility]     Patient Name: Chance Lopez  : 1955  MRN: [id number]    Today's Date: 2021                    Admit Date: 2021      Visit Dx: No diagnosis found.    Patient Active Problem List   Diagnosis   • Colon cancer screening   • Gastritis   • CVA (cerebral vascular accident) (CMS/HCC)       Past Medical History:   Diagnosis Date   • Diabetes mellitus (CMS/HCC)    • Diarrhea    • Full dentures     INSTRUCTED NO ADHESIVES THE DOS. REPORTS USUALLY ONLY WEARS UPPER PLATE.   • Hearing loss     REPORTS MORE LOSS ON LEFT SIDE BUT THAT HE HAS BILATERAL LOSS. NO USE OF HEARING AIDS.   • Hepatitis     REPORTS AS A CHILD.  UNSURE IF TYPE A OR B.   • History of acute pancreatitis     REPORTS PRIOR TO    • History of gout    • Hypertension    • Seasonal allergies    • Stroke (CMS/HCC)    • Wears glasses     RX GLASSES       Past Surgical History:   Procedure Laterality Date   • COLONOSCOPY N/A 2018    Procedure: COLONOSCOPY;  Surgeon: Ha Sykes MD;  Location: [facility] ENDOSCOPY;  Service: Gastroenterology   • ENDOSCOPY N/A 2018    Procedure: ESOPHAGOGASTRODUODENOSCOPY with cold forcep biopsy;  Surgeon: Ha Sykes MD;  Location: [facility] ENDOSCOPY;  Service: Gastroenterology   • TOOTH EXTRACTION            PT ASSESSMENT (last 12 hours)      IRF PT Evaluation and Treatment     Row Name 21 1606          PT Time and Intention    Document Type  daily treatment  -RF     Mode of Treatment  individual therapy;physical therapy  -RF     Patient/Family/Caregiver Comments/Observations  Pt c/o lateral R foot pain with ambualtion. PT demonstrates poor processing and requires frequent cuing for proper technique.   -RF     Row Name 21 1606          General Information    Existing Precautions/Restrictions  fall confusion  -RF     Row Name 21 1606          Cognition/Psychosocial    Affect/Mental Status (Cognitive)   confused  -RF     Follows Commands (Cognition)  verbal cues/prompting required;physical/tactile prompts required;increased processing time needed;repetition of directions required  -RF     Row Name 07/05/21 1606          Pain Scale: FACES Pre/Post-Treatment    Pain: FACES Scale, Pretreatment  0-->no hurt  -RF     Posttreatment Pain Rating  0-->no hurt  -RF     Row Name 07/05/21 1606          Bed Mobility    Supine-Sit [place] (Bed Mobility)  verbal cues;nonverbal cues (demo/gesture);contact guard;standby assist  -RF     Row Name 07/05/21 1606          Transfer Assessment/Treatment    Transfers  car transfer  -RF     Row Name 07/05/21 1606          Transfers    Bed-Chair [place] (Transfers)  verbal cues;nonverbal cues (demo/gesture);contact guard  -RF     Chair-Bed [place] (Transfers)  verbal cues;nonverbal cues (demo/gesture);contact guard  -RF     Assistive Device (Bed-Chair Transfers)  wheelchair  -RF     Sit-Stand [place] (Transfers)  contact guard;verbal cues;nonverbal cues (demo/gesture)  -RF     Stand-Sit [place] (Transfers)  contact guard;verbal cues;nonverbal cues (demo/gesture)  -RF     Row Name 07/05/21 1606          Chair-Bed Transfer    Assistive Device (Chair-Bed Transfers)  wheelchair  -RF     Row Name 07/05/21 1606          Sit-Stand Transfer    Assistive Device (Sit-Stand Transfers)  walker, front-wheeled  -RF     Row Name 07/05/21 1606          Stand-Sit Transfer    Assistive Device (Stand-Sit Transfers)  walker, front-wheeled  -RF     Row Name 07/05/21 1606          Car Transfer    Type (Car Transfer)  stand pivot/stand step  -RF     [place] Level (Car Transfer)  contact guard;minimum assist (75% patient effort) req cuing for technique  -RF     Assistive Device (Car Transfer)  walker, front-wheeled  -RF     Row Name 07/05/21 1606          Gait/Stairs (Locomotion)    [place] Level (Gait)  contact guard;verbal cues;nonverbal cues (demo/gesture)  -RF     Assistive  Device (Gait)  walker, front-wheeled  -RF     Distance in Feet (Gait)  160X2 in AM, 320 in PM  -RF     Deviations/Abnormal Patterns (Gait)  ataxic;dee decreased;gait speed decreased;stride length decreased  -RF     [place] Level (Stairs)  contact guard  -RF     Handrail Location (Stairs)  both sides  -RF     Number of Steps (Stairs)  10  -RF     Ascending Technique (Stairs)  step-over-step  -RF     Descending Technique (Stairs)  step-over-step  -RF     Stairs, Impairments  motor control impaired;coordination impaired;strength decreased  -RF     Comment (Gait/Stairs)  Good ambulation quality with minimal unsteadiness noted. Pt requires cuing and assistance with walker management and navigating obstacles.   -RF     Row Name 07/05/21 1606          Balance    Static Standing Balance  mild impairment;moderate impairment;unsupported;standing;other (see comments) for toileting; unsteadiness noted , however no LOB observed  -RF     Row Name 07/05/21 1606          Motor Skills    Therapeutic Exercise  hip;knee;ankle  -RF     Row Name 07/05/21 1606          Hip (Therapeutic Exercise)    Hip (Therapeutic Exercise)  strengthening exercise  -RF     Hip Strengthening (Therapeutic Exercise)  bilateral;flexion;extension;aBduction;aDduction;heel slides;marching while seated;marching while standing;sitting;standing;resistance band;green;2 sets;10 repetitions;other (see comments) poor motor planning; req inc verbal and tactile cues  -RF     Row Name 07/05/21 1606          Knee (Therapeutic Exercise)    Knee (Therapeutic Exercise)  strengthening exercise  -RF     Knee Strengthening (Therapeutic Exercise)  bilateral;flexion;extension;heel slides;marching while seated;marching while standing;LAQ (long arc quad);hamstring curls;sitting;standing;resistance band;green;2 sets;10 repetitions;other (see comments) poor motor planning; req inc verbal and tactile cues  -RF     Row Name 07/05/21 1606          Ankle (Therapeutic Exercise)     Ankle (Therapeutic Exercise)  strengthening exercise  -RF     Ankle Strengthening (Therapeutic Exercise)  bilateral;dorsiflexion;plantarflexion;sitting;standing;2 sets;10 repetitions;other (see comments) poor motor planning; req inc verbal and tactile cues  -RF     Row Name 07/05/21 1606          IRF PT Goals    Bed Mobility Goal Selection (PT-IRF)  bed mobility, PT goal 1  -RF     Transfer Goal Selection (PT-IRF)  transfers, PT goal 1  -RF     Gait (Walking Locomotion) Goal Selection (PT-IRF)  gait, PT goal 1  -RF     Row Name 07/05/21 1606          Bed Mobility Goal 1 (PT-IRF)    Activity/Assistive Device (Bed Mobility Goal 1, PT-IRF)  sit to supine/supine to sit  -RF     [place] Level (Bed Mobility Goal 1, PT-IRF)  independent  -RF     Time Frame (Bed Mobility Goal 1, PT-IRF)  by discharge  -RF     Row Name 07/05/21 1606          Transfer Goal 1 (PT-IRF)    Activity/Assistive Device (Transfer Goal 1, PT-IRF)  sit-to-stand/stand-to-sit;bed-to-chair/chair-to-bed  -RF     [place] Level (Transfer Goal 1, PT-IRF)  supervision required  -RF     Time Frame (Transfer Goal 1, PT-IRF)  by discharge  -RF     Row Name 07/05/21 1606          Gait/Walking Locomotion Goal 1 (PT-IRF)    Activity/Assistive Device (Gait/Walking Locomotion Goal 1, PT-IRF)  walker, rolling  -RF     Gait/Walking Locomotion Distance Goal 1 (PT-IRF)  300'  -RF     [place] Level (Gait/Walking Locomotion Goal 1, PT-IRF)  supervision required  -RF     Time Frame (Gait/Walking Locomotion Goal 1, PT-IRF)  by discharge  -RF     Row Name 07/05/21 1606          Positioning and Restraints    Pre-Treatment Position  sitting in chair/recliner bed in PM  -RF     In Wheelchair  sitting;call light within reach;encouraged to call for assist AM, OT in PM  -RF     Row Name 07/05/21 1606          Therapy Assessment/Plan (PT)    Patient's Goals For Discharge  return home  -     Row Name 07/05/21 1606          Therapy Assessment/Plan (PT)    Rehab  Potential/Prognosis (PT)  adequate, monitor progress closely  -RF     Frequency of Treatment (PT)  5 times per week  -RF     Estimated Duration of Therapy (PT)  1 week;2 weeks  -RF     Problem List (PT)  balance;cognition;coordination;mobility;strength  -RF     Activity Limitations Related to Problem List (PT)  unable to ambulate safely;unable to transfer safely  -RF     Row Name 07/05/21 1606          Daily Progress Summary (PT)    Functional Goal Overall Progress (PT)  progressing toward functional goals as expected  -RF     Daily Progress Summary (PT)  Pt demonstrates fair functional mobility and ambulation quality this date, however increased cuing required for proper technique and safety. Pt demonstrates poor motor planning and safety awareness. COntinued skilled care required for further improvements.   -RF     Impairments Still Limiting Function (PT)  strength decreased;coordination impaired;impaired functional activity tolerance;motor control impaired  -RF     Recommendations (PT)  Continue per current POC   -RF     Row Name 07/05/21 1606          Therapy Plan Review/Discharge Plan (PT)    Anticipated Equipment Needs at Discharge (PT Eval)  -- tbd  -RF     Expected Discharge Disposition (PT Eval)  home with home health care  -RF       User Key  (r) = Recorded By, (t) = Taken By, (c) = Cosigned By    Initials Name Provider Type    RF Nuris Mccollum, PTA Physical Therapy Assistant           Physical Therapy Education                 Title: PT OT SLP Therapies (Done)     Topic: Physical Therapy (Done)     Point: Mobility training (Done)     Learning Progress Summary           Patient Acceptance, E,TB, VU by RF at 7/5/2021 1613    Acceptance, E, VU,NR by LB at 7/3/2021 1357    Acceptance, E, VU,NR by LB at 7/2/2021 1524                   Point: Home exercise program (Done)     Learning Progress Summary           Patient Acceptance, E,TB, VU by RF at 7/5/2021 1613    Acceptance, E, VU,NR by LB at 7/3/2021 1357     Acceptance, E, VU,NR by LB at 7/2/2021 1524                   Point: Body mechanics (Done)     Learning Progress Summary           Patient Acceptance, E,TB, VU by RF at 7/5/2021 1613    Acceptance, E, VU,NR by LB at 7/3/2021 1357    Acceptance, E, VU,NR by LB at 7/2/2021 1524                   Point: Precautions (Done)     Learning Progress Summary           Patient Acceptance, E,TB, VU by RF at 7/5/2021 1613    Acceptance, E, VU,NR by LB at 7/3/2021 1357    Acceptance, E, VU,NR by LB at 7/2/2021 1524                               User Key     Initials Effective Dates Name Provider Type Discipline    LB 06/16/21 -  Linda Rodríguez, PT Physical Therapist PT    RF 06/16/21 -  Nuris Mccollum PTA Physical Therapy Assistant PT                PT Recommendation and Plan    Frequency of Treatment (PT): 5 times per week  Anticipated Equipment Needs at Discharge (PT Eval):  (tbd)  Daily Progress Summary (PT)  Functional Goal Overall Progress (PT): progressing toward functional goals as expected  Daily Progress Summary (PT): Pt demonstrates fair functional mobility and ambulation quality this date, however increased cuing required for proper technique and safety. Pt demonstrates poor motor planning and safety awareness. COntinued skilled care required for further improvements.   Impairments Still Limiting Function (PT): strength decreased, coordination impaired, impaired functional activity tolerance, motor control impaired  Recommendations (PT): Continue per current POC                Time Calculation:     PT Charges     Row Name 07/05/21 1614 07/05/21 1613          Time Calculation    Start Time  1300  -RF  1055  -RF     Stop Time  1330  -RF  1140  -RF     Time Calculation (min)  30 min  -RF  45 min  -RF     PT Received On  07/05/21  -RF  07/05/21  -RF     PT - Next Appointment  07/06/21  -RF  07/05/21  -RF     PT Goal Re-Cert Due Date  07/09/21  -RF  07/09/21  -RF        Time Calculation- PT    Total Timed Code  Minutes- PT  30 minute(s)  -RF  45 minute(s)  -RF       User Key  (r) = Recorded By, (t) = Taken By, (c) = Cosigned By    Initials Name Provider Type    RF Nuris Mccollum PTA Physical Therapy Assistant          Therapy Charges for Today     Code Description Service Date Service Provider Modifiers Qty    38721480873  GAIT TRAINING EA 15 MIN 7/5/2021 Nuris Mccollum PTA GP 2    24331331210  PT THER PROC EA 15 MIN 7/5/2021 Nuris Mccollum PTA GP 1    [id number]  PT THERAPEUTIC ACT EA 15 MIN 7/5/2021 Nuris Mccollum PTA GP 2                   Nuris Mccollum PTA  7/5/2021

## 2021-07-05 NOTE — PLAN OF CARE
Goal Outcome Evaluation:  Plan of Care Reviewed With: patient        Progress: improving  Outcome Summary: patient up with therapies this am. meds given for bowel care. no other complaints at this time. continue plan of care.

## 2021-07-05 NOTE — PROGRESS NOTES
Occupational Therapy: Individual: 80 minutes.    Physical Therapy:    Speech Language Pathology:    Signed by: Geno Mccollum OT

## 2021-07-05 NOTE — PROGRESS NOTES
PROGRESS NOTE         Patient Identification:  Name:  Chance Lopez  Age:  65 y.o.  Sex:  male  :  1955  MRN:  [id number]  Visit Number:  [id number]  Primary Care Provider:  Ramos Gurrola MD         LOS: 4 days       ----------------------------------------------------------------------------------------------------------------------  Subjective       Chief Complaints:    Debility      Interval History:    65-year-old gentleman who was recently transferred from [facility] after CVA involving the bihemispheric MCA distribution    Seen while sitting up in bed, eating breakfast.  Was eating breakfast independently.  The patient is mildly confused however he can follow commands.  Patient complains of severe constipation, has not had a bowel movement last 7 days.  Elena-Colace 2 tablets p.o. twice daily has been ordered, as well as milk of magnesia as needed.  Vital signs stable on room air.  Blood glucose has been elevated, Levemir increased to 15 units subcu nightly.  Creatinine stable at 1.44.    Participated with physical, speech, occupational therapy on 7/3: Performs bed mobility with contact-guard verbal/nonverbal cues; performs transfer activities with minimal assist and verbal/nonverbal cues; utilizes wheelchair/front wheeled walker for chair to bed, sit to stand, stand to sit transfer; ambulated 80 feet x 2 with a front wheeled walker and contact-guard with verbal/nonverbal cues; performed 15 minutes of aerobic exercise on recumbent stationary bike; performed therapeutic exercises of shoulder; elbow/forearm; hand; performed AROM.      Review of Systems:    Constitutional: no fever, chills and night sweats.  Generalized fatigue.  Eyes: no eye drainage, itching or redness.  HEENT: no mouth sores, dysphagia or nose bleed.  Respiratory: no for shortness of breath, cough or production of sputum.  Cardiovascular: no chest pain, no palpitations, no orthopnea.  Gastrointestinal: no nausea,  vomiting or diarrhea. No abdominal pain, hematemesis or rectal bleeding.  Genitourinary: no dysuria or polyuria.  Hematologic/lymphatic: no lymph node abnormalities, no easy bruising or easy bleeding.  Musculoskeletal: no muscle or joint pain.  Skin: No rash and no itching.  Neurological: mild confusion  Behavioral/Psych: no depression or suicidal ideation.  Endocrine: no hot flashes.  Immunologic: negative.    ----------------------------------------------------------------------------------------------------------------------      Objective       [facility] Meds:  aspirin, 81 mg, Oral, Daily  atorvastatin, 40 mg, Oral, Nightly  carvedilol, 12.5 mg, Oral, BID With Meals  cetirizine, 10 mg, Oral, Daily  clopidogrel, 75 mg, Oral, Daily  enoxaparin, 40 mg, Subcutaneous, Q24H  fluticasone, 2 spray, Each Nare, Daily  insulin aspart, 10 Units, Subcutaneous, TID With Meals  insulin detemir, 15 Units, Subcutaneous, Nightly  multivitamin, 1 tablet, Oral, Daily  OLANZapine, 5 mg, Oral, Nightly  PARoxetine, 20 mg, Oral, Daily  sennosides-docusate, 2 tablet, Oral, BID         ----------------------------------------------------------------------------------------------------------------------    Vital Signs:  Temp:  [98 °F (36.7 °C)-98.8 °F (37.1 °C)] 98 °F (36.7 °C)  Heart Rate:  [70-86] 70  Resp:  [18] 18  BP: (115-134)/(61-66) 131/66  No data found.  SpO2 Percentage    07/04/21 1410 07/04/21 1900 07/05/21 0740   SpO2: 98% 99% 97%     SpO2:  [97 %-99 %] 97 %  on   ;   Device (Oxygen Therapy): room air    Body mass index is 24.39 kg/m².  Wt Readings from Last 3 Encounters:   07/01/21 79.3 kg (174 lb 13.2 oz)   01/04/19 83.6 kg (184 lb 3.2 oz)   12/14/18 83.9 kg (184 lb 15.5 oz)        Intake/Output Summary (Last 24 hours) at 7/5/2021 1125  Last data filed at 7/5/2021 0900  Gross per 24 hour   Intake 1200 ml   Output 200 ml   Net 1000 ml     Diet Soft Texture; Chopped; Thin; Consistent  Carbohydrate  ----------------------------------------------------------------------------------------------------------------------      Physical Exam:    General Appearance: awake and alert, mildly confused but able to follow commands. Sitting up in bed, eating breakfast independently.    Head: normocephalic, without obvious abnormality and atraumatic    Eyes: lids and lashes normal, conjunctivae and sclerae normal, no icterus, no  pallor, corneas clear and PERRLA    Ears: ears appear intact with no abnormalities noted    Nose: nares normal, septum midline, mucosa normal and no drainage     Throat: no oral lesions, no thrush, oral mucosa moist and oopharynx normal    Neck: no adenopathy, supple, trachea midline, no thyromegaly, no carotid bruit  and no JVD    Back: no kyphosis present, no scoliosis present, no skin lesions, erythema, or  scars, no tenderness to percussion or palpation and range of motion normal    Lungs: clear to auscultation, respirations regular, respirations even and  respirations unlabored. No accessory muscle use.     Heart:: regular rhythm & normal rate, normal S1, S2, no murmur, no gallop, no  rub and no click.  Chest wall with no abnormalities observed. PMI nondisplaced    Abdomen: normal bowel sounds in all quadrants, no masses, no hepatomegaly,  no splenomegaly, soft non-tender, no guarding and no rebound tenderness    Extremities: no edema, no cyanosis, no redness, no tenderness, no clubbing    Musculoskeletal: joints with no effusion nor erythema nor warmth.  Pedal pulses palpable and equal bilaterally    Skin: no bleeding, bruising or rash and no lesions noted    Lymph Nodes: no palpable adenopathy    Neurologic:    Mental Status: Patient is awake alert and able to follow commands. Mild confusion.   Sensory: Sensory overall seems to be intact in BLE and BUE.   Motor strength: Generalized  weakness        ----------------------------------------------------------------------------------------------------------------------            Results from last 7 days   Lab Units 07/02/21  0206   WBC 10*3/mm3 5.12   HEMOGLOBIN g/dL 11.3*   HEMATOCRIT % 34.3*   MCV fL 99.7*   MCHC g/dL 32.9   PLATELETS 10*3/mm3 126*     Results from last 7 days   Lab Units 07/04/21  0117 07/03/21  0111 07/02/21  0206   SODIUM mmol/L 138  --  141   POTASSIUM mmol/L 4.8  --  4.2   MAGNESIUM mg/dL  --  2.1 1.6   CHLORIDE mmol/L 101  --  105   CO2 mmol/L 27.4  --  26.0   BUN mg/dL 20  --  15   CREATININE mg/dL 1.44*  --  1.45*   EGFR IF NONAFRICN AM mL/min/1.73 49*  --  49*   CALCIUM mg/dL 9.4  --  9.2   GLUCOSE mg/dL 304*  --  180*   ALBUMIN g/dL  --   --  3.44*   BILIRUBIN mg/dL  --   --  0.4   ALK PHOS U/L  --   --  90   AST (SGOT) U/L  --   --  27   ALT (SGPT) U/L  --   --  35   Estimated Creatinine Clearance: 57.4 mL/min (A) (by C-G formula based on SCr of 1.44 mg/dL (H)).  No results found for: AMMONIA    Glucose   Date/Time Value Ref Range Status   07/05/2021 0612 226 (H) 70 - 130 mg/dL Final     Comment:     Meter: [id number] : [id number] aLron Crook   07/04/2021 1945 329 (H) 70 - 130 mg/dL Final     Comment:     Meter: [id number] : [id number] Laron Crook   07/04/2021 1612 218 (H) 70 - 130 mg/dL Final     Comment:     Meter: [id number] : [id number] kev wisdom   07/04/2021 1412 243 (H) 70 - 130 mg/dL Final     Comment:     Meter: [id number] : [id number] kev wisdom   07/04/2021 1052 296 (H) 70 - 130 mg/dL Final     Comment:     RN Notified Meter: [id number] : [id number] NAN THOMAS   07/04/2021 0614 276 (H) 70 - 130 mg/dL Final     Comment:     Meter: [id number] : [id number] Laron Crook   07/03/2021 1950 259 (H) 70 - 130 mg/dL Final     Comment:     Meter: [id number] : [id number] Laron Crook   07/03/2021 1601 278 (H) 70 - 130 mg/dL Final     Comment:     Meter:  [id number] : [id number] kev wisdom     No results found for: HGBA1C  No results found for: TSH, FREET4    No results found for: BLOODCX  No results found for: URINECX  No results found for: WOUNDCX  No results found for: STOOLCX  No results found for: RESPCX  Pain Management Panel    There is no flowsheet data to display.           ----------------------------------------------------------------------------------------------------------------------  Imaging Results (Last 24 Hours)       ** No results found for the last 24 hours. **            ----------------------------------------------------------------------------------------------------------------------    Assessment/Plan       Assessment/Plan     ASSESSMENT:    Status post CVA with bihemispheric MCA infarctions  Diabetes mellitus  Acute kidney injury  Hypertension  Encephalopathy      PLAN:    Seen while sitting up in bed, eating breakfast.  Was eating breakfast independently.  The patient is mildly confused however he can follow commands.  Patient complains of severe constipation, has not had a bowel movement last 7 days.  Elena-Colace 2 tablets p.o. twice daily has been ordered, as well as milk of magnesia as needed.  Vital signs stable on room air.  Blood glucose has been elevated, Levemir increased to 15 units subcu nightly.  Creatinine stable at 1.44.    Status post CVA with bihemispheric MCA infarctions-- Participated with physical, speech, occupational therapy on 7/3: Performs bed mobility with contact-guard verbal/nonverbal cues; performs transfer activities with minimal assist and verbal/nonverbal cues; utilizes wheelchair/front wheeled walker for chair to bed, sit to stand, stand to sit transfer; ambulated 80 feet x 2 with a front wheeled walker and contact-guard with verbal/nonverbal cues; performed 15 minutes of aerobic exercise on recumbent stationary bike; performed therapeutic exercises of shoulder; elbow/forearm; hand; performed  AROM.    Diabetes mellitus--blood glucose has been elevated, Levemir has been increased to 15 units subcu nightly    Acute kidney injury--creatinine stable at 1.44     Hypertension continue Coreg 12.5 mg twice daily     Recent issues with acute encephalopathy--continues to have mild confusion, awake and alert enough to follow commands. Patient currently on Paxil and Zyprexa        Code Status:   Code Status and Medical Interventions:   Ordered at: 07/01/21 1836     Level Of Support Discussed With:    Patient     Code Status:    CPR     Medical Interventions (Level of Support Prior to Arrest):    Full     Scribed for Chelsie Mccauley MD by SANTA Ribeiro. 7/5/2021  11:26 EDT       SANTA Ribeiro  07/05/21  11:25 EDT    Physician Attestation:    The documentation recorded by the scribe accurately reflects the service I personally performed and the decisions made by me.    Chelsie Mccauley MD  Infectious Diseases  07/05/21  12:20 EDT

## 2021-07-05 NOTE — PROGRESS NOTES
Inpatient Rehabilitation Plan of Care Note    Plan of Care    Self Care    Toileting (Active)  Current Status (7/5/2021 2:00:00 PM): Min A  Weekly Goal: CGA  Discharge Goal: sup    Signed by: Geno Mccollum OT

## 2021-07-06 LAB
GLUCOSE BLDC GLUCOMTR-MCNC: 163 MG/DL (ref 70–130)
GLUCOSE BLDC GLUCOMTR-MCNC: 184 MG/DL (ref 70–130)
GLUCOSE BLDC GLUCOMTR-MCNC: 222 MG/DL (ref 70–130)
GLUCOSE BLDC GLUCOMTR-MCNC: 79 MG/DL (ref 70–130)
GLUCOSE BLDC GLUCOMTR-MCNC: 91 MG/DL (ref 70–130)

## 2021-07-06 PROCEDURE — 92507 TX SP LANG VOICE COMM INDIV: CPT

## 2021-07-06 PROCEDURE — 97116 GAIT TRAINING THERAPY: CPT

## 2021-07-06 PROCEDURE — 97530 THERAPEUTIC ACTIVITIES: CPT

## 2021-07-06 PROCEDURE — 97112 NEUROMUSCULAR REEDUCATION: CPT

## 2021-07-06 PROCEDURE — 63710000001 INSULIN ASPART PER 5 UNITS: Performed by: FAMILY MEDICINE

## 2021-07-06 PROCEDURE — 63710000001 INSULIN DETEMIR PER 5 UNITS: Performed by: INTERNAL MEDICINE

## 2021-07-06 PROCEDURE — 25010000002 ENOXAPARIN PER 10 MG: Performed by: FAMILY MEDICINE

## 2021-07-06 PROCEDURE — 97535 SELF CARE MNGMENT TRAINING: CPT

## 2021-07-06 PROCEDURE — 82962 GLUCOSE BLOOD TEST: CPT

## 2021-07-06 RX ADMIN — DOCUSATE SODIUM 50 MG AND SENNOSIDES 8.6 MG 2 TABLET: 8.6; 5 TABLET, FILM COATED ORAL at 21:14

## 2021-07-06 RX ADMIN — ATORVASTATIN CALCIUM 40 MG: 40 TABLET, FILM COATED ORAL at 21:14

## 2021-07-06 RX ADMIN — CARVEDILOL 12.5 MG: 6.25 TABLET, FILM COATED ORAL at 17:28

## 2021-07-06 RX ADMIN — FLUTICASONE PROPIONATE 2 SPRAY: 50 SPRAY, METERED NASAL at 08:11

## 2021-07-06 RX ADMIN — PAROXETINE HYDROCHLORIDE 20 MG: 20 TABLET, FILM COATED ORAL at 08:11

## 2021-07-06 RX ADMIN — ASPIRIN 81 MG: 81 TABLET, COATED ORAL at 08:11

## 2021-07-06 RX ADMIN — ENOXAPARIN SODIUM 40 MG: 40 INJECTION SUBCUTANEOUS at 21:13

## 2021-07-06 RX ADMIN — Medication 1 TABLET: at 08:11

## 2021-07-06 RX ADMIN — INSULIN ASPART 10 UNITS: 100 INJECTION, SOLUTION INTRAVENOUS; SUBCUTANEOUS at 07:13

## 2021-07-06 RX ADMIN — INSULIN ASPART 10 UNITS: 100 INJECTION, SOLUTION INTRAVENOUS; SUBCUTANEOUS at 12:31

## 2021-07-06 RX ADMIN — OLANZAPINE 5 MG: 5 TABLET, FILM COATED ORAL at 21:14

## 2021-07-06 RX ADMIN — CETIRIZINE HYDROCHLORIDE 10 MG: 10 TABLET, FILM COATED ORAL at 08:11

## 2021-07-06 RX ADMIN — DOCUSATE SODIUM 50 MG AND SENNOSIDES 8.6 MG 2 TABLET: 8.6; 5 TABLET, FILM COATED ORAL at 08:11

## 2021-07-06 RX ADMIN — CLOPIDOGREL 75 MG: 75 TABLET, FILM COATED ORAL at 08:11

## 2021-07-06 RX ADMIN — INSULIN DETEMIR 15 UNITS: 100 INJECTION, SOLUTION SUBCUTANEOUS at 21:21

## 2021-07-06 RX ADMIN — CARVEDILOL 12.5 MG: 6.25 TABLET, FILM COATED ORAL at 07:14

## 2021-07-06 RX ADMIN — INSULIN ASPART 10 UNITS: 100 INJECTION, SOLUTION INTRAVENOUS; SUBCUTANEOUS at 17:26

## 2021-07-06 NOTE — THERAPY TREATMENT NOTE
Inpatient Rehabilitation - Occupational Therapy Treatment Note     [place]     Patient Name: Chance Lopez  : 1955  MRN: [id number]    Today's Date: 2021                 Admit Date: 2021       No diagnosis found.    Patient Active Problem List   Diagnosis   • Colon cancer screening   • Gastritis   • CVA (cerebral vascular accident) (CMS/HCC)       Past Medical History:   Diagnosis Date   • Diabetes mellitus (CMS/HCC)    • Diarrhea    • Full dentures     INSTRUCTED NO ADHESIVES THE DOS. REPORTS USUALLY ONLY WEARS UPPER PLATE.   • Hearing loss     REPORTS MORE LOSS ON LEFT SIDE BUT THAT HE HAS BILATERAL LOSS. NO USE OF HEARING AIDS.   • Hepatitis     REPORTS AS A CHILD.  UNSURE IF TYPE A OR B.   • History of acute pancreatitis     REPORTS PRIOR TO    • History of gout    • Hypertension    • Seasonal allergies    • Stroke (CMS/HCC)    • Wears glasses     RX GLASSES       Past Surgical History:   Procedure Laterality Date   • COLONOSCOPY N/A 2018    Procedure: COLONOSCOPY;  Surgeon: Ha Sykes MD;  Location: [facility] ENDOSCOPY;  Service: Gastroenterology   • ENDOSCOPY N/A 2018    Procedure: ESOPHAGOGASTRODUODENOSCOPY with cold forcep biopsy;  Surgeon: Ha Sykes MD;  Location: [facility] ENDOSCOPY;  Service: Gastroenterology   • TOOTH EXTRACTION                  IRF OT ASSESSMENT FLOWSHEET (last 12 hours)      IRF OT Evaluation and Treatment     Row Name 21 1010          OT Time and Intention    Document Type  daily treatment  -LM     Mode of Treatment  occupational therapy  -LM     Patient Effort  good  -LM     Row Name 21 1010          General Information    Patient/Family/Caregiver Comments/Observations  Patient seen this am for adl retraining/education, bue therex/TA, fxl mobility.  Gmc/fmc bue, min assist with toilet t/f, mod assist with toileting, min assist with le dressing.  -LM     Existing Precautions/Restrictions  fall  -LM     Limitations/Impairments   safety/cognitive  -LM     Row Name 07/06/21 1010          Cognition/Psychosocial    Orientation Status (Cognition)  oriented to;place;person;situation  -LM     Personal Safety Interventions  gait belt;supervised activity  -LM     Cognitive Function (Cognitive)  safety deficit  -LM     Safety Deficit (Cognitive)  judgment;insight into deficits/self-awareness;impulsivity;minimal deficit;problem-solving;safety precautions awareness  -LM     Row Name 07/06/21 1010          Positioning and Restraints    Post Treatment Position  wheelchair  -LM     In Wheelchair  encouraged to call for assist;notified nsg;patient within staff view nsg station  -       User Key  (r) = Recorded By, (t) = Taken By, (c) = Cosigned By    Initials Name Effective Dates    LM Alva De La Torre OT 06/16/21 -            Occupational Therapy Education                 Title: PT OT SLP Therapies (Done)     Topic: Occupational Therapy (Done)     Point: ADL training (Done)     Description:   Instruct learner(s) on proper safety adaptation and remediation techniques during self care or transfers.   Instruct in proper use of assistive devices.              Learning Progress Summary           Patient Acceptance, E,TB, VU by RF at 7/5/2021 1613                   Point: Home exercise program (Done)     Description:   Instruct learner(s) on appropriate technique for monitoring, assisting and/or progressing therapeutic exercises/activities.              Learning Progress Summary           Patient Acceptance, E,TB, VU by RF at 7/5/2021 1613                   Point: Precautions (Done)     Description:   Instruct learner(s) on prescribed precautions during self-care and functional transfers.              Learning Progress Summary           Patient Acceptance, E,TB, VU by RF at 7/5/2021 1613                   Point: Body mechanics (Done)     Description:   Instruct learner(s) on proper positioning and spine alignment during self-care, functional mobility  activities and/or exercises.              Learning Progress Summary           Patient Acceptance, E,TB, VU by  at 7/5/2021 1613                               User Key     Initials Effective Dates Name Provider Type Discipline     06/16/21 -  Nuris Mccollum PTA Physical Therapy Assistant PT                    OT Recommendation and Plan                         Time Calculation:     Time Calculation- OT     Row Name 07/06/21 1020             Time Calculation- OT    OT Start Time  0745  -LM      OT Stop Time  0915  -LM      OT Time Calculation (min)  90 min  -LM      Total Timed Code Minutes- OT  90 minute(s)  -LM        User Key  (r) = Recorded By, (t) = Taken By, (c) = Cosigned By    Initials Name Provider Type    LM Alva De La Torre, OT Occupational Therapist        Therapy Charges for Today     Code Description Service Date Service Provider Modifiers Qty    10432516947 HC OT SELF CARE/MGMT/TRAIN EA 15 MIN 7/6/2021 Alva De La Torre, OT GO 2    33186730046 HC OT NEUROMUSC RE EDUCATION EA 15 MIN 7/6/2021 Alva De La Torre OT GO 2    83096080512 HC OT THERAPEUTIC ACT EA 15 MIN 7/6/2021 Alva De La Torre OT GO 2                   Alva De La Torre OT  7/6/2021

## 2021-07-06 NOTE — THERAPY TREATMENT NOTE
Inpatient Rehabilitation - Physical Therapy Treatment Note        [facility]     Patient Name: Chance Lopez  : 1955  MRN: [id number]    Today's Date: 2021                    Admit Date: 2021      Visit Dx: No diagnosis found.    Patient Active Problem List   Diagnosis   • Colon cancer screening   • Gastritis   • CVA (cerebral vascular accident) (CMS/HCC)       Past Medical History:   Diagnosis Date   • Diabetes mellitus (CMS/HCC)    • Diarrhea    • Full dentures     INSTRUCTED NO ADHESIVES THE DOS. REPORTS USUALLY ONLY WEARS UPPER PLATE.   • Hearing loss     REPORTS MORE LOSS ON LEFT SIDE BUT THAT HE HAS BILATERAL LOSS. NO USE OF HEARING AIDS.   • Hepatitis     REPORTS AS A CHILD.  UNSURE IF TYPE A OR B.   • History of acute pancreatitis     REPORTS PRIOR TO    • History of gout    • Hypertension    • Seasonal allergies    • Stroke (CMS/HCC)    • Wears glasses     RX GLASSES       Past Surgical History:   Procedure Laterality Date   • COLONOSCOPY N/A 2018    Procedure: COLONOSCOPY;  Surgeon: Ha Sykes MD;  Location: [facility] ENDOSCOPY;  Service: Gastroenterology   • ENDOSCOPY N/A 2018    Procedure: ESOPHAGOGASTRODUODENOSCOPY with cold forcep biopsy;  Surgeon: Ha Sykes MD;  Location: [facility] ENDOSCOPY;  Service: Gastroenterology   • TOOTH EXTRACTION            PT ASSESSMENT (last 12 hours)      IRF PT Evaluation and Treatment     Row Name 21 1538          PT Time and Intention    Document Type  daily treatment  -RF     Mode of Treatment  individual therapy;physical therapy  -RF     Patient/Family/Caregiver Comments/Observations  No significant c/o noted.   -RF     Row Name 21 1538          General Information    Existing Precautions/Restrictions  fall confusion  -RF     Row Name 21 1538          Cognition/Psychosocial    Affect/Mental Status (Cognitive)  confused  -RF     Follows Commands (Cognition)  verbal cues/prompting required;physical/tactile prompts  required;increased processing time needed;repetition of directions required  -RF     Row Name 07/06/21 1538          Pain Scale: FACES Pre/Post-Treatment    Pain: FACES Scale, Pretreatment  0-->no hurt  -RF     Posttreatment Pain Rating  0-->no hurt  -RF     Row Name 07/06/21 1538          Transfer Assessment/Treatment    Transfers  car transfer  -RF     Comment (Transfers)  Poor safety awareness noted due to poor processing and motor planning. CUes required for proper technique with transferring and functional mobility.   -RF     Row Name 07/06/21 1538          Transfers    Bed-Chair [place] (Transfers)  verbal cues;nonverbal cues (demo/gesture);contact guard;standby assist  -RF     Chair-Bed [place] (Transfers)  verbal cues;nonverbal cues (demo/gesture);contact guard;standby assist  -RF     Assistive Device (Bed-Chair Transfers)  wheelchair  -RF     Sit-Stand [place] (Transfers)  contact guard;verbal cues;nonverbal cues (demo/gesture);standby assist  -RF     Stand-Sit [place] (Transfers)  contact guard;verbal cues;nonverbal cues (demo/gesture);standby assist  -RF     Row Name 07/06/21 1538          Chair-Bed Transfer    Assistive Device (Chair-Bed Transfers)  wheelchair  -RF     Row Name 07/06/21 1538          Sit-Stand Transfer    Assistive Device (Sit-Stand Transfers)  walker, front-wheeled  -RF     Row Name 07/06/21 1538          Stand-Sit Transfer    Assistive Device (Stand-Sit Transfers)  walker, front-wheeled  -RF     Row Name 07/06/21 1538          Gait/Stairs (Locomotion)    [place] Level (Gait)  contact guard;verbal cues;nonverbal cues (demo/gesture)  -RF     Assistive Device (Gait)  walker, front-wheeled  -RF     Distance in Feet (Gait)  320 BID  -RF     Deviations/Abnormal Patterns (Gait)  ataxic;dee decreased;gait speed decreased;stride length decreased  -RF     Comment (Gait/Stairs)  Pt demonstrates difficulty navigating obstacles with RW.  No significnat LOB noted. CUes  required for safety awareness.   -RF     Row Name 07/06/21 1538          Balance    Static Standing Balance  moderate impairment;supported;unsupported;standing;other (see comments) foam balance with alt TANISHA  -RF     Dynamic Standing Balance  mild impairment;supported;asymmetrical weight shifting;other (see comments) bag toss with reach outside TANISHA; RW used for support  -RF     Balance Interventions  other (see comments) weave cones; unilat step up frwd/side  -RF     Row Name 07/06/21 1538          Aerobic Exercise    Type (Aerobic Exercise)  recumbent stationary bike  -RF     Time Performed (Aerobic Exercise)  15  -RF     Comment, Aerobic Exercise (Therapeutic Exercise)  LVL 4.0  -RF     Row Name 07/06/21 1538          IRF PT Goals    Bed Mobility Goal Selection (PT-IRF)  bed mobility, PT goal 1  -RF     Transfer Goal Selection (PT-IRF)  transfers, PT goal 1  -RF     Gait (Walking Locomotion) Goal Selection (PT-IRF)  gait, PT goal 1  -RF     Row Name 07/06/21 1538          Bed Mobility Goal 1 (PT-IRF)    Activity/Assistive Device (Bed Mobility Goal 1, PT-IRF)  sit to supine/supine to sit  -RF     [place] Level (Bed Mobility Goal 1, PT-IRF)  independent  -RF     Time Frame (Bed Mobility Goal 1, PT-IRF)  by discharge  -RF     Row Name 07/06/21 1538          Transfer Goal 1 (PT-IRF)    Activity/Assistive Device (Transfer Goal 1, PT-IRF)  sit-to-stand/stand-to-sit;bed-to-chair/chair-to-bed  -RF     [place] Level (Transfer Goal 1, PT-IRF)  supervision required  -RF     Time Frame (Transfer Goal 1, PT-IRF)  by discharge  -RF     Row Name 07/06/21 1538          Gait/Walking Locomotion Goal 1 (PT-IRF)    Activity/Assistive Device (Gait/Walking Locomotion Goal 1, PT-IRF)  walker, rolling  -RF     Gait/Walking Locomotion Distance Goal 1 (PT-IRF)  300'  -RF     [place] Level (Gait/Walking Locomotion Goal 1, PT-IRF)  supervision required  -RF     Time Frame (Gait/Walking Locomotion Goal 1, PT-IRF)  by discharge   -RF     Row Name 07/06/21 1538          Positioning and Restraints    Pre-Treatment Position  sitting in chair/recliner BID  -RF     In Bed  supine;call light within reach;encouraged to call for assist;exit alarm on PM  -RF     In Wheelchair  sitting;encouraged to call for assist;patient within staff view AM  -RF     Row Name 07/06/21 1538          Therapy Assessment/Plan (PT)    Patient's Goals For Discharge  return home  -RF     Row Name 07/06/21 1538          Therapy Assessment/Plan (PT)    Rehab Potential/Prognosis (PT)  adequate, monitor progress closely  -RF     Frequency of Treatment (PT)  5 times per week  -RF     Estimated Duration of Therapy (PT)  1 week;2 weeks  -RF     Problem List (PT)  balance;cognition;coordination;mobility;strength  -RF     Activity Limitations Related to Problem List (PT)  unable to ambulate safely;unable to transfer safely  -RF     Row Name 07/06/21 1538          Daily Progress Summary (PT)    Functional Goal Overall Progress (PT)  progressing toward functional goals as expected  -RF     Daily Progress Summary (PT)  Pt continues to demonstrate poor motor planning and processing resulting in poor safety awarenss. Cues and tactile assistance required for safety awareness. Improving functional mobility noted. Continued skilled care required for further improvements.   -RF     Impairments Still Limiting Function (PT)  strength decreased;coordination impaired;impaired functional activity tolerance;motor control impaired  -RF     Recommendations (PT)  Continue per current POC.   -RF     Row Name 07/06/21 1538          Therapy Plan Review/Discharge Plan (PT)    Anticipated Equipment Needs at Discharge (PT Eval)  -- tbd  -RF     Expected Discharge Disposition (PT Eval)  home with home health care  -RF       User Key  (r) = Recorded By, (t) = Taken By, (c) = Cosigned By    Initials Name Provider Type    RF Nuris Mccollum PTA Physical Therapy Assistant           Physical Therapy  Education                 Title: PT OT SLP Therapies (Done)     Topic: Physical Therapy (Done)     Point: Mobility training (Done)     Learning Progress Summary           Patient Acceptance, E,TB, VU by RF at 7/6/2021 1545    Acceptance, E,TB, VU by RF at 7/5/2021 1613    Acceptance, E, VU,NR by LB at 7/3/2021 1357    Acceptance, E, VU,NR by LB at 7/2/2021 1524                   Point: Home exercise program (Done)     Learning Progress Summary           Patient Acceptance, E,TB, VU by RF at 7/6/2021 1545    Acceptance, E,TB, VU by RF at 7/5/2021 1613    Acceptance, E, VU,NR by LB at 7/3/2021 1357    Acceptance, E, VU,NR by LB at 7/2/2021 1524                   Point: Body mechanics (Done)     Learning Progress Summary           Patient Acceptance, E,TB, VU by RF at 7/6/2021 1545    Acceptance, E,TB, VU by RF at 7/5/2021 1613    Acceptance, E, VU,NR by LB at 7/3/2021 1357    Acceptance, E, VU,NR by LB at 7/2/2021 1524                   Point: Precautions (Done)     Learning Progress Summary           Patient Acceptance, E,TB, VU by RF at 7/6/2021 1545    Acceptance, E,TB, VU by RF at 7/5/2021 1613    Acceptance, E, VU,NR by LB at 7/3/2021 1357    Acceptance, E, VU,NR by LB at 7/2/2021 1524                               User Key     Initials Effective Dates Name Provider Type Discipline     06/16/21 -  Linda Rodríguez, PT Physical Therapist PT     06/16/21 -  Nuris Mccollum PTA Physical Therapy Assistant PT                PT Recommendation and Plan    Frequency of Treatment (PT): 5 times per week  Anticipated Equipment Needs at Discharge (PT Eval):  (tbd)  Daily Progress Summary (PT)  Functional Goal Overall Progress (PT): progressing toward functional goals as expected  Daily Progress Summary (PT): Pt continues to demonstrate poor motor planning and processing resulting in poor safety awarenss. Cues and tactile assistance required for safety awareness. Improving functional mobility noted. Continued  skilled care required for further improvements.   Impairments Still Limiting Function (PT): strength decreased, coordination impaired, impaired functional activity tolerance, motor control impaired  Recommendations (PT): Continue per current POC.                Time Calculation:     PT Charges     Row Name 07/06/21 1547 07/06/21 1546          Time Calculation    Start Time  1330  -RF  1045  -RF     Stop Time  1400  -RF  1145  -RF     Time Calculation (min)  30 min  -RF  60 min  -RF     PT Received On  07/06/21  -RF  07/06/21  -RF     PT - Next Appointment  07/07/21  -RF  07/06/21  -RF     PT Goal Re-Cert Due Date  07/09/21  -RF  07/09/21  -RF        Time Calculation- PT    Total Timed Code Minutes- PT  30 minute(s)  -RF  60 minute(s)  -RF       User Key  (r) = Recorded By, (t) = Taken By, (c) = Cosigned By    Initials Name Provider Type    RF Nuris Mccollum, PTA Physical Therapy Assistant          Therapy Charges for Today     Code Description Service Date Service Provider Modifiers Qty    85777266364 HC GAIT TRAINING EA 15 MIN 7/5/2021 Nuris Mccollum, PTA GP 2    59393791049 HC PT THER PROC EA 15 MIN 7/5/2021 Nuris Mccollum, PTA GP 1    06007548325 HC PT THERAPEUTIC ACT EA 15 MIN 7/5/2021 Nuris Mccollum, PTA GP 2    17189473597 HC GAIT TRAINING EA 15 MIN 7/6/2021 Nuris Mccollum, PTA GP 2    68698330545 HC PT NEUROMUSC RE EDUCATION EA 15 MIN 7/6/2021 Nuris Mccollum, PTA GP 3    44586065124 HC PT THERAPEUTIC ACT EA 15 MIN 7/6/2021 Nuris Mccollum, PTA GP 1                   Nuris Mccollum, PTA  7/6/2021

## 2021-07-06 NOTE — PROGRESS NOTES
Inpatient Rehabilitation Functional Measures Assessment and Plan of Care    Plan of Care  Communication    [ST] Expression(Active)  Current Status(07/03/2021): Deficits noted w/ speech language  Weekly Goal(07/05/2021): STM memory tasks  Discharge Goal: WFL communication    Functional Measures  RENETTA Eating:  RENETTA Grooming:  RENETTA Bathing:  RENETTA Upper Body Dressing:  RENETTA Lower Body Dressing:  RENETTA Toileting:    RENETTA Bladder Management  Level of Assistance:  Frequency/Number of Accidents this Shift:    RENETTA Bowel Management  Level of Assistance:  Frequency/Number of Accidents this Shift:    RENETTA Bed/Chair/Wheelchair Transfer:  RENETTA Toilet Transfer:  RENETTA Tub/Shower Transfer:    Previously Documented Mode of Locomotion at Discharge:  [facility] Expected Mode of Locomotion at Discharge:  RENETTA Walk/Wheelchair:  [facility] Stairs:    [facility] Comprehension:  RENETTA Expression:  [facility] Social Interaction:  [facility] Problem Solving:  RENETTA Memory:    Therapy Mode Minutes  Occupational Therapy:  Physical Therapy:  Speech Language Pathology: Individual: 30 minutes.    Discharge Functional Goals:    Signed by: Claribel Lovell SLP

## 2021-07-06 NOTE — PROGRESS NOTES
Inpatient Rehabilitation Functional Measures Assessment    Functional Measures  RENETTA Eating:  RENETTA Grooming:  RENETTA Bathing:  RENETTA Upper Body Dressing:  RENETTA Lower Body Dressing:  RENETTA Toileting:    RENETTA Bladder Management  Level of Assistance:  Frequency/Number of Accidents this Shift:    RENETTA Bowel Management  Level of Assistance:  Frequency/Number of Accidents this Shift:    RENETTA Bed/Chair/Wheelchair Transfer:  RENETTA Toilet Transfer:  RENETTA Tub/Shower Transfer:    Previously Documented Mode of Locomotion at Discharge:  [facility] Expected Mode of Locomotion at Discharge:  [facility] Walk/Wheelchair:  [facility] Stairs:    [facility] Comprehension:  [facility] Expression:  [facility] Social Interaction:  [facility] Problem Solving:  RENETTA Memory:    Therapy Mode Minutes  Occupational Therapy: Individual: 90 minutes.  Physical Therapy:  Speech Language Pathology:    Discharge Functional Goals:    Signed by: Alva De La Torre, Occupational Therapist

## 2021-07-06 NOTE — PLAN OF CARE
Goal Outcome Evaluation:  Plan of Care Reviewed With: patient        Progress: improving  Outcome Summary: patient progressing with therapies. constipation resolved. continue plan of care.

## 2021-07-06 NOTE — PROGRESS NOTES
Occupational Therapy:    Physical Therapy:    Speech Language Pathology: Individual: 45 minutes.    Signed by: PABLO Leon

## 2021-07-06 NOTE — THERAPY TREATMENT NOTE
Inpatient Rehabilitation - Speech Language Pathology Treatment Note  [facility]     Patient Name: Chance Lopez  : 1955  MRN: [id number]  Today's Date: 2021             Admit Date: 2021     SPEECH LANGUAGE THERAPY PLAN OF CARE:     Chance Lopez was seen this am in the speech therapy office for speech language therapy.  He is pleasant and cooperative throughout session though generally confused.  Pt is easily distracted and requires cues to reorient to task.  Pt is noted w/ decreased neglect of R visual field on this date.  Pt is noted w/ vague blanket statements across session.      Pt is noted w/ inconsistent speech language skills varying often daily, concerning for underlying dementia or other cognitive impairment.     Long Term Goal:  Pt will demonstrate wfl speech language skills in all activities and w/ all communication partners to complete adls.      Short Term Goals:  1. Pt will recall various pictures/items to increase STM in 3/5 opp over 3 consecutive sessions w/ min cues.   * 1/4 w/ mod cues.      2. Pt will recall personal LTM information in 3/5 opp over 3 consecutive sessions w/ min cues.   *Recalls name, , son's names.  Unable to provide grandchildren's names though does recall he has 4.  Unable to provide daughter-in-law's names.      3. Pt will complete following multi-step directives tasks related to adls in 3/5 opp over 3 consecutive sessions w/ min cues.   * 2/5 opp w/ mod-max cues and w/ multiple re-directions and prompts.       4. Pt will perform sequencing tasks related to adls in 3/5 opp over 3 consecutive sessions w/ min cues.   * Not specifically addressed on this date.      5. Pt will perform convergent naming tasks w/ min cues in 3/5 opp over 3 consecutive sessions.   * 0/6 w/ mod-max cues.       6. Pt will perform divergent naming tasks w/ min cues in approx 1 min in 3/5 opp over 3 consecutive sessions.   * 1 items named independently  * 3 items named w/ mod cues in approx 2  min     7. Pt will perform graphic tasks related to adls w/ min cues in 3/5 opp over 3 consecutive sessions.   * pt unable to produce name independently.  Pt able to copy first name and half of last name, then independently finish last name.      8. Pt will demonstrate topic maintenance across session w/ min cues in 3/5 opp over 3 consecutive sessions.     9. Pt will label items/pictures presented in 3/5 opp to decrease anomia w/ min cues over 3 consecutive sessions.   * 10/12 pictures w/ mod cues      Thank you-  Claribel Lovell M.S., CFY-SLP    Visit Dx:  No diagnosis found.  Patient Active Problem List   Diagnosis   • Colon cancer screening   • Gastritis   • CVA (cerebral vascular accident) (CMS/HCC)     Past Medical History:   Diagnosis Date   • Diabetes mellitus (CMS/HCC)    • Diarrhea    • Full dentures     INSTRUCTED NO ADHESIVES THE DOS. REPORTS USUALLY ONLY WEARS UPPER PLATE.   • Hearing loss     REPORTS MORE LOSS ON LEFT SIDE BUT THAT HE HAS BILATERAL LOSS. NO USE OF HEARING AIDS.   • Hepatitis     REPORTS AS A CHILD.  UNSURE IF TYPE A OR B.   • History of acute pancreatitis     REPORTS PRIOR TO 2000   • History of gout    • Hypertension    • Seasonal allergies    • Stroke (CMS/HCC)    • Wears glasses     RX GLASSES     Past Surgical History:   Procedure Laterality Date   • COLONOSCOPY N/A 11/26/2018    Procedure: COLONOSCOPY;  Surgeon: Ha Sykes MD;  Location: [facility] ENDOSCOPY;  Service: Gastroenterology   • ENDOSCOPY N/A 11/26/2018    Procedure: ESOPHAGOGASTRODUODENOSCOPY with cold forcep biopsy;  Surgeon: Ha Sykes MD;  Location: [facility] ENDOSCOPY;  Service: Gastroenterology   • TOOTH EXTRACTION              EDUCATION  The patient has been educated in the following areas:     Cognitive Impairment Communication Impairment.    SLP Recommendation and Plan         Continue per POC                                                     Time Calculation:     Time Calculation- SLP     Row Name 07/06/21  1439             Time Calculation- SLP    SLP Start Time  1000  -      SLP Stop Time  1045  -      SLP Time Calculation (min)  45 min  -      SLP - Next Appointment  07/07/21  -        User Key  (r) = Recorded By, (t) = Taken By, (c) = Cosigned By    Initials Name Provider Type    Claribel Correa MS, CFY-SLP Speech and Language Pathologist          Therapy Charges for Today     Code Description Service Date Service Provider Modifiers Qty    32484686261  ST TREATMENT SPEECH 3 7/5/2021 Claribel Lovell MS, CFY-SLP GN 1    [id number]  ST TREATMENT SPEECH 3 7/6/2021 Claribel Lovell MS, CFY-SLP GN 1                     Claribel Lovell MS, KITTY-SLP  7/6/2021

## 2021-07-06 NOTE — PROGRESS NOTES
Inpatient Rehabilitation Functional Measures Assessment and Plan of Care    Plan of Care      Functional Measures  RENETTA Eating:  RENETTA Grooming:  RENETTA Bathing:  RENETTA Upper Body Dressing:  RENETTA Lower Body Dressing:  RENETTA Toileting:    RENETTA Bladder Management  Level of Assistance:  Frequency/Number of Accidents this Shift:    RENETTA Bowel Management  Level of Assistance:  Frequency/Number of Accidents this Shift:    RENETTA Bed/Chair/Wheelchair Transfer:  RENETTA Toilet Transfer:  RENETTA Tub/Shower Transfer:    Previously Documented Mode of Locomotion at Discharge:  RENETTA Expected Mode of Locomotion at Discharge:  [facility] Walk/Wheelchair:  [facility] Stairs:    [facility] Comprehension:  [facility] Expression:  [facility] Social Interaction:  [facility] Problem Solving:  RENETTA Memory:    Therapy Mode Minutes  Occupational Therapy:  Physical Therapy:  Speech Language Pathology: Individual: 40 minutes.    Discharge Functional Goals:    Signed by: PABLO Leon

## 2021-07-06 NOTE — PROGRESS NOTES
Occupational Therapy:    Physical Therapy: Individual: 90 minutes.    Speech Language Pathology:    Signed by: Nuris Mccollum PTA

## 2021-07-06 NOTE — PROGRESS NOTES
PROGRESS NOTE         Patient Identification:  Name:  Chance Lopez  Age:  65 y.o.  Sex:  male  :  1955  MRN:  [id number]  Visit Number:  [id number]  Primary Care Provider:  Ramos Gurrola MD         LOS: 5 days       ----------------------------------------------------------------------------------------------------------------------  Subjective       Chief Complaints:    Debility      Interval History:    65-year-old gentleman who was recently transferred from [facility] after CVA involving the bihemispheric MCA distribution    Patient was seen while sitting up in bed eating breakfast.  The patient states he has not had a bowel movement x6 days, an enema was ordered this morning.  The patient denies abdominal pain or nausea.  Blood glucose has been very well controlled.  No new labs today.    Participated with physical, occupational, speech therapy on : Performs bed mobility with contact-guard/standby assistance with verbal/nonverbal cues; performs transfer activities with contact-guard and verbal/nonverbal cues; utilizes wheelchair/front wheeled walker for chair to bed, sit to stand, stand to sit transfer; ambulated 160 feet x 2 and 320 feet with front wheeled walker and contact-guard with verbal/nonverbal cues; participated in therapeutic exercises of hip, knee, ankle; required minimal assist with verbal/nonverbal cues for lower body dressing; participated in motor control/coordination interventions; gross motor coordination activities; fine motor manipulation/dexterity activity.    Review of Systems:    Constitutional: no fever, chills and night sweats.  Generalized fatigue.  Eyes: no eye drainage, itching or redness.  HEENT: no mouth sores, dysphagia or nose bleed.  Respiratory: no for shortness of breath, cough or production of sputum.  Cardiovascular: no chest pain, no palpitations, no orthopnea.  Gastrointestinal: no nausea, vomiting or diarrhea. No abdominal pain,  hematemesis or rectal bleeding.  Genitourinary: no dysuria or polyuria.  Hematologic/lymphatic: no lymph node abnormalities, no easy bruising or easy bleeding.  Musculoskeletal: no muscle or joint pain.  Skin: No rash and no itching.  Neurological: mild confusion  Behavioral/Psych: no depression or suicidal ideation.  Endocrine: no hot flashes.  Immunologic: negative.    ----------------------------------------------------------------------------------------------------------------------      Objective       [facility] Meds:  aspirin, 81 mg, Oral, Daily  atorvastatin, 40 mg, Oral, Nightly  carvedilol, 12.5 mg, Oral, BID With Meals  cetirizine, 10 mg, Oral, Daily  clopidogrel, 75 mg, Oral, Daily  enoxaparin, 40 mg, Subcutaneous, Q24H  fluticasone, 2 spray, Each Nare, Daily  insulin aspart, 10 Units, Subcutaneous, TID With Meals  insulin detemir, 15 Units, Subcutaneous, Nightly  multivitamin, 1 tablet, Oral, Daily  OLANZapine, 5 mg, Oral, Nightly  PARoxetine, 20 mg, Oral, Daily  sennosides-docusate, 2 tablet, Oral, BID         ----------------------------------------------------------------------------------------------------------------------    Vital Signs:  Temp:  [98 °F (36.7 °C)-98.2 °F (36.8 °C)] 98.2 °F (36.8 °C)  Heart Rate:  [65-90] 65  Resp:  [16-20] 16  BP: (112-132)/(67-69) 112/69  No data found.  SpO2 Percentage    07/05/21 0740 07/05/21 1900 07/06/21 0708   SpO2: 97% 97% 94%     SpO2:  [94 %-97 %] 94 %  on   ;   Device (Oxygen Therapy): room air    Body mass index is 24.39 kg/m².  Wt Readings from Last 3 Encounters:   07/01/21 79.3 kg (174 lb 13.2 oz)   01/04/19 83.6 kg (184 lb 3.2 oz)   12/14/18 83.9 kg (184 lb 15.5 oz)        Intake/Output Summary (Last 24 hours) at 7/6/2021 1018  Last data filed at 7/6/2021 0800  Gross per 24 hour   Intake 960 ml   Output 250 ml   Net 710 ml     Diet Soft Texture; Chopped; Thin; Consistent  Carbohydrate  ----------------------------------------------------------------------------------------------------------------------      Physical Exam:    General Appearance: Seen while sitting up in bed eating breakfast, denies any complaints other than constipation.    Head: normocephalic, without obvious abnormality and atraumatic    Eyes: lids and lashes normal, conjunctivae and sclerae normal, no icterus, no  pallor, corneas clear and PERRLA    Ears: ears appear intact with no abnormalities noted    Nose: nares normal, septum midline, mucosa normal and no drainage     Throat: no oral lesions, no thrush, oral mucosa moist and oopharynx normal    Neck: no adenopathy, supple, trachea midline, no thyromegaly, no carotid bruit  and no JVD    Back: no kyphosis present, no scoliosis present, no skin lesions, erythema, or  scars, no tenderness to percussion or palpation and range of motion normal    Lungs: clear to auscultation, respirations regular, respirations even and  respirations unlabored. No accessory muscle use.     Heart:: regular rhythm & normal rate, normal S1, S2, no murmur, no gallop, no  rub and no click.  Chest wall with no abnormalities observed. PMI nondisplaced    Abdomen: normal bowel sounds in all quadrants, no masses, no hepatomegaly,  no splenomegaly, soft non-tender, no guarding and no rebound tenderness    Extremities: no edema, no cyanosis, no redness, no tenderness, no clubbing    Musculoskeletal: joints with no effusion nor erythema nor warmth.  Pedal pulses palpable and equal bilaterally    Skin: no bleeding, bruising or rash and no lesions noted    Lymph Nodes: no palpable adenopathy    Neurologic:    Mental Status: Patient is awake alert and able to follow commands. Mild confusion.   Sensory: Sensory overall seems to be intact in BLE and BUE.   Motor strength: Generalized  weakness        ----------------------------------------------------------------------------------------------------------------------            Results from last 7 days   Lab Units 07/02/21  0206   WBC 10*3/mm3 5.12   HEMOGLOBIN g/dL 11.3*   HEMATOCRIT % 34.3*   MCV fL 99.7*   MCHC g/dL 32.9   PLATELETS 10*3/mm3 126*     Results from last 7 days   Lab Units 07/04/21  0117 07/03/21  0111 07/02/21  0206   SODIUM mmol/L 138  --  141   POTASSIUM mmol/L 4.8  --  4.2   MAGNESIUM mg/dL  --  2.1 1.6   CHLORIDE mmol/L 101  --  105   CO2 mmol/L 27.4  --  26.0   BUN mg/dL 20  --  15   CREATININE mg/dL 1.44*  --  1.45*   EGFR IF NONAFRICN AM mL/min/1.73 49*  --  49*   CALCIUM mg/dL 9.4  --  9.2   GLUCOSE mg/dL 304*  --  180*   ALBUMIN g/dL  --   --  3.44*   BILIRUBIN mg/dL  --   --  0.4   ALK PHOS U/L  --   --  90   AST (SGOT) U/L  --   --  27   ALT (SGPT) U/L  --   --  35   Estimated Creatinine Clearance: 57.4 mL/min (A) (by C-G formula based on SCr of 1.44 mg/dL (H)).  No results found for: AMMONIA    Glucose   Date/Time Value Ref Range Status   07/06/2021 0603 184 (H) 70 - 130 mg/dL Final     Comment:     Meter: [id number] : [id number] Domingo Crystal   07/05/2021 1951 159 (H) 70 - 130 mg/dL Final     Comment:     Meter: [id number] : [id number] Laron Crook   07/05/2021 1612 207 (H) 70 - 130 mg/dL Final     Comment:     Meter: [id number] : [id number] Bijan Cordon   07/05/2021 1124 139 (H) 70 - 130 mg/dL Final     Comment:     Meter: [id number] : [id number] Cornell Dena   07/05/2021 0612 226 (H) 70 - 130 mg/dL Final     Comment:     Meter: [id number] : [id number] Laron Crook   07/04/2021 1945 329 (H) 70 - 130 mg/dL Final     Comment:     Meter: [id number] : [id number] Laron Crook   07/04/2021 1612 218 (H) 70 - 130 mg/dL Final     Comment:     Meter: [id number] : [id number] kev wisdom   07/04/2021 1412 243 (H) 70 - 130 mg/dL Final     Comment:     Meter: [id number] :  [id number] kev wisdom     No results found for: HGBA1C  No results found for: TSH, FREET4    No results found for: BLOODCX  No results found for: URINECX  No results found for: WOUNDCX  No results found for: STOOLCX  No results found for: RESPCX  Pain Management Panel    There is no flowsheet data to display.           ----------------------------------------------------------------------------------------------------------------------  Imaging Results (Last 24 Hours)       ** No results found for the last 24 hours. **            ----------------------------------------------------------------------------------------------------------------------    Assessment/Plan       Assessment/Plan     ASSESSMENT:    Status post CVA with bihemispheric MCA infarctions  Diabetes mellitus  Acute kidney injury  Hypertension  Encephalopathy      PLAN:    Patient was seen while sitting up in bed eating breakfast.  The patient states he has not had a bowel movement x6 days, an enema was ordered this morning.  The patient denies abdominal pain or nausea.  Blood glucose has been very well controlled.  No new labs today.    Participated with physical, occupational, speech therapy on 7/5: Performs bed mobility with contact-guard/standby assistance with verbal/nonverbal cues; performs transfer activities with contact-guard and verbal/nonverbal cues; utilizes wheelchair/front wheeled walker for chair to bed, sit to stand, stand to sit transfer; ambulated 160 feet x 2 and 320 feet with front wheeled walker and contact-guard with verbal/nonverbal cues; participated in therapeutic exercises of hip, knee, ankle; required minimal assist with verbal/nonverbal cues for lower body dressing; participated in motor control/coordination interventions; gross motor coordination activities; fine motor manipulation/dexterity activity.    Diabetes mellitus-- 15 units subcu nightly    Acute kidney injury--creatinine stable      Hypertension continue Coreg  12.5 mg twice daily     Recent issues with acute encephalopathy--continues to have mild confusion, awake and alert enough to follow commands. Patient currently on Paxil and Zyprexa        Code Status:   Code Status and Medical Interventions:   Ordered at: 07/01/21 1838     Level Of Support Discussed With:    Patient     Code Status:    CPR     Medical Interventions (Level of Support Prior to Arrest):    Full     Scribed for Chelsie Mccauley MD by SANTA Ribeiro. 7/6/2021  10:18 EDT       SANTA Ribeiro  07/06/21  10:18 EDT    Physician Attestation:    The documentation recorded by the scribe accurately reflects the service I personally performed and the decisions made by me.    Chelsie Mccauley MD  Infectious Diseases  07/06/21  10:18 EDT

## 2021-07-07 LAB
GLUCOSE BLDC GLUCOMTR-MCNC: 220 MG/DL (ref 70–130)
GLUCOSE BLDC GLUCOMTR-MCNC: 227 MG/DL (ref 70–130)
GLUCOSE BLDC GLUCOMTR-MCNC: 246 MG/DL (ref 70–130)
GLUCOSE BLDC GLUCOMTR-MCNC: 287 MG/DL (ref 70–130)

## 2021-07-07 PROCEDURE — 82962 GLUCOSE BLOOD TEST: CPT

## 2021-07-07 PROCEDURE — 97110 THERAPEUTIC EXERCISES: CPT

## 2021-07-07 PROCEDURE — 97116 GAIT TRAINING THERAPY: CPT

## 2021-07-07 PROCEDURE — 92507 TX SP LANG VOICE COMM INDIV: CPT

## 2021-07-07 PROCEDURE — 97535 SELF CARE MNGMENT TRAINING: CPT

## 2021-07-07 PROCEDURE — 97110 THERAPEUTIC EXERCISES: CPT | Performed by: OCCUPATIONAL THERAPIST

## 2021-07-07 PROCEDURE — 63710000001 INSULIN ASPART PER 5 UNITS: Performed by: FAMILY MEDICINE

## 2021-07-07 PROCEDURE — 63710000001 INSULIN DETEMIR PER 5 UNITS: Performed by: INTERNAL MEDICINE

## 2021-07-07 PROCEDURE — 25010000002 ENOXAPARIN PER 10 MG: Performed by: FAMILY MEDICINE

## 2021-07-07 PROCEDURE — 97530 THERAPEUTIC ACTIVITIES: CPT | Performed by: OCCUPATIONAL THERAPIST

## 2021-07-07 PROCEDURE — 97530 THERAPEUTIC ACTIVITIES: CPT

## 2021-07-07 RX ADMIN — Medication 1 TABLET: at 08:51

## 2021-07-07 RX ADMIN — ATORVASTATIN CALCIUM 40 MG: 40 TABLET, FILM COATED ORAL at 20:14

## 2021-07-07 RX ADMIN — ASPIRIN 81 MG: 81 TABLET, COATED ORAL at 08:50

## 2021-07-07 RX ADMIN — PAROXETINE HYDROCHLORIDE 20 MG: 20 TABLET, FILM COATED ORAL at 08:51

## 2021-07-07 RX ADMIN — INSULIN DETEMIR 15 UNITS: 100 INJECTION, SOLUTION SUBCUTANEOUS at 20:58

## 2021-07-07 RX ADMIN — CLOPIDOGREL 75 MG: 75 TABLET, FILM COATED ORAL at 08:51

## 2021-07-07 RX ADMIN — OLANZAPINE 5 MG: 5 TABLET, FILM COATED ORAL at 20:14

## 2021-07-07 RX ADMIN — DOCUSATE SODIUM 50 MG AND SENNOSIDES 8.6 MG 2 TABLET: 8.6; 5 TABLET, FILM COATED ORAL at 20:14

## 2021-07-07 RX ADMIN — INSULIN ASPART 10 UNITS: 100 INJECTION, SOLUTION INTRAVENOUS; SUBCUTANEOUS at 11:25

## 2021-07-07 RX ADMIN — DOCUSATE SODIUM 50 MG AND SENNOSIDES 8.6 MG 2 TABLET: 8.6; 5 TABLET, FILM COATED ORAL at 08:51

## 2021-07-07 RX ADMIN — INSULIN ASPART 10 UNITS: 100 INJECTION, SOLUTION INTRAVENOUS; SUBCUTANEOUS at 17:16

## 2021-07-07 RX ADMIN — INSULIN ASPART 10 UNITS: 100 INJECTION, SOLUTION INTRAVENOUS; SUBCUTANEOUS at 08:53

## 2021-07-07 RX ADMIN — CETIRIZINE HYDROCHLORIDE 10 MG: 10 TABLET, FILM COATED ORAL at 08:51

## 2021-07-07 RX ADMIN — FLUTICASONE PROPIONATE 2 SPRAY: 50 SPRAY, METERED NASAL at 08:51

## 2021-07-07 RX ADMIN — CARVEDILOL 12.5 MG: 6.25 TABLET, FILM COATED ORAL at 17:16

## 2021-07-07 RX ADMIN — ENOXAPARIN SODIUM 40 MG: 40 INJECTION SUBCUTANEOUS at 20:13

## 2021-07-07 RX ADMIN — CARVEDILOL 12.5 MG: 6.25 TABLET, FILM COATED ORAL at 08:50

## 2021-07-07 NOTE — THERAPY TREATMENT NOTE
Inpatient Rehabilitation - Occupational Therapy Treatment Note     [place]     Patient Name: Chance Lopez  : 1955  MRN: [id number]    Today's Date: 2021                 Admit Date: 2021       No diagnosis found.    Patient Active Problem List   Diagnosis   • Colon cancer screening   • Gastritis   • CVA (cerebral vascular accident) (CMS/HCC)       Past Medical History:   Diagnosis Date   • Diabetes mellitus (CMS/HCC)    • Diarrhea    • Full dentures     INSTRUCTED NO ADHESIVES THE DOS. REPORTS USUALLY ONLY WEARS UPPER PLATE.   • Hearing loss     REPORTS MORE LOSS ON LEFT SIDE BUT THAT HE HAS BILATERAL LOSS. NO USE OF HEARING AIDS.   • Hepatitis     REPORTS AS A CHILD.  UNSURE IF TYPE A OR B.   • History of acute pancreatitis     REPORTS PRIOR TO    • History of gout    • Hypertension    • Seasonal allergies    • Stroke (CMS/HCC)    • Wears glasses     RX GLASSES       Past Surgical History:   Procedure Laterality Date   • COLONOSCOPY N/A 2018    Procedure: COLONOSCOPY;  Surgeon: Ha Sykes MD;  Location: [facility] ENDOSCOPY;  Service: Gastroenterology   • ENDOSCOPY N/A 2018    Procedure: ESOPHAGOGASTRODUODENOSCOPY with cold forcep biopsy;  Surgeon: Ha Sykes MD;  Location: [facility] ENDOSCOPY;  Service: Gastroenterology   • TOOTH EXTRACTION                  IRF OT ASSESSMENT FLOWSHEET (last 12 hours)      IRF OT Evaluation and Treatment     Row Name 21 1600 21 1402       OT Time and Intention    Document Type  daily treatment  -AH  daily treatment  -LM    Mode of Treatment  individual therapy;occupational therapy  -AH  occupational therapy  -LM    Patient Effort  good  -AH  good  -LM    Row Name 21 1600 21 1402       General Information    Patient/Family/Caregiver Comments/Observations  patient agreeable to therapy  -  Patient seen this date for light bue arom ta/therex and adl retraining/fxl mobility.  Patient participated in table top gmc/fmc and light  strengthening tasks for bue.  Fxl transfer to bed from w/c with min/cga.   Frequent rest breaks.  -LM    Existing Precautions/Restrictions  fall  -  fall  -LM    Limitations/Impairments  --  safety/cognitive  -LM    Row Name 07/07/21 1402          Cognition/Psychosocial    Cognitive Function (Cognitive)  safety deficit  -     Safety Deficit (Cognitive)  minimal deficit;judgment;problem-solving;safety precautions awareness  -     Row Name 07/07/21 1600          Motor Skills    Motor Skills  coordination;functional endurance;therapeutic exercise  -     Therapeutic Exercise  -- ROM. strength, fmc,gmc  -     Row Name 07/07/21 1402          Positioning and Restraints    Post Treatment Position  bed  -LM     In Bed  notified nsg;call light within reach;encouraged to call for assist;exit alarm on  -       User Key  (r) = Recorded By, (t) = Taken By, (c) = Cosigned By    Initials Name Effective Dates     Ambreen Marinelli, OT 06/16/21 -     LM Alva De La Torre, OT 06/16/21 -            Occupational Therapy Education                 Title: PT OT SLP Therapies (Done)     Topic: Occupational Therapy (Done)     Point: ADL training (Done)     Description:   Instruct learner(s) on proper safety adaptation and remediation techniques during self care or transfers.   Instruct in proper use of assistive devices.              Learning Progress Summary           Patient Acceptance, E,TB, VU by RF at 7/5/2021 1613                   Point: Home exercise program (Done)     Description:   Instruct learner(s) on appropriate technique for monitoring, assisting and/or progressing therapeutic exercises/activities.              Learning Progress Summary           Patient Acceptance, E,TB, VU by RF at 7/5/2021 1613                   Point: Precautions (Done)     Description:   Instruct learner(s) on prescribed precautions during self-care and functional transfers.              Learning Progress Summary           Patient  Acceptance, E,TB, VU by  at 7/5/2021 1613                   Point: Body mechanics (Done)     Description:   Instruct learner(s) on proper positioning and spine alignment during self-care, functional mobility activities and/or exercises.              Learning Progress Summary           Patient Acceptance, E,TB, VU by  at 7/5/2021 1613                               User Key     Initials Effective Dates Name Provider Type [facility] 06/16/21 -  Nuris Mccollum PTA Physical Therapy Assistant PT                    OT Recommendation and Plan    Planned Therapy Interventions (OT): activity tolerance training, adaptive equipment training, BADL retraining, neuromuscular control/coordination retraining, patient/caregiver education/training, ROM/therapeutic exercise, strengthening exercise, transfer/mobility retraining                    Time Calculation:     Time Calculation- OT     Row Name 07/07/21 1652 07/07/21 1404          Time Calculation- OT    OT Start Time  0915  -AH  1000  -LM     OT Stop Time  1000  -AH  1045  -LM     OT Time Calculation (min)  45 min  -AH  45 min  -LM     Total Timed Code Minutes- OT  --  45 minute(s)  -LM       User Key  (r) = Recorded By, (t) = Taken By, (c) = Cosigned By    Initials Name Provider Type     Ambreen Marinelli OT Occupational Therapist    LM Alva De La Torre OT Occupational Therapist        Therapy Charges for Today     Code Description Service Date Service Provider Modifiers Qty    44778046024  OT THERAPEUTIC ACT EA 15 MIN 7/7/2021 Ambreen Marinelli OT GO 2    26962801256 HC OT THER PROC EA 15 MIN 7/7/2021 Ambreen Marinelli OT GO 1                   Ambreen Marinelli OT  7/7/2021

## 2021-07-07 NOTE — PROGRESS NOTES
Inpatient Rehabilitation Functional Measures Assessment    Functional Measures  RENETTA Eating:  RENETTA Grooming:  RENETTA Bathing:  RENETTA Upper Body Dressing:  RENETTA Lower Body Dressing:  RENETTA Toileting:    RENETTA Bladder Management  Level of Assistance:  Frequency/Number of Accidents this Shift:    RENETTA Bowel Management  Level of Assistance:  Frequency/Number of Accidents this Shift:    RENETTA Bed/Chair/Wheelchair Transfer:  RENETTA Toilet Transfer:  RENETTA Tub/Shower Transfer:    Previously Documented Mode of Locomotion at Discharge:  [facility] Expected Mode of Locomotion at Discharge:  [facility] Walk/Wheelchair:  [facility] Stairs:    [facility] Comprehension:  [facility] Expression:  [facility] Social Interaction:  [facility] Problem Solving:  RENETTA Memory:    Therapy Mode Minutes  Occupational Therapy: Individual: 90 minutes.  Physical Therapy:  Speech Language Pathology:    Discharge Functional Goals:    Signed by: Alva De La Torre, Occupational Therapist

## 2021-07-07 NOTE — SIGNIFICANT NOTE
07/06/21 0441   Plan   Plan Spoke to pt about how he is doing in therapy, need for 24 hour assistance/supervision, plans for discharge on 7-14-21 if he can return home with caregiver(s), and option for SNF placement for continued rehab.  Pt is willing to consider option for SNF placement for continued rehab.  Pt has [employer] replacement insurance which requires PA for admission to SNF.  Pt aware sons will discuss options and discharge plans then follow-up with SS on 7-7-21.  Will follow.   Patient/Family in Agreement with Plan yes

## 2021-07-07 NOTE — SIGNIFICANT NOTE
07/07/21 0944   Plan   Plan Faxed face sheet, H&P, PT/OT/SLP notes and evaluations, MD progress note, labs, medication list, and active orders to Counts include 234 beds at the [facility] [phone] for review.

## 2021-07-07 NOTE — PHARMACY PATIENT ASSISTANCE
Pharmacy checked on price of Olanzapine 5 mg initiated inpatient. Per patient's plan, copay will be $3.70 for 1 month supply. No other issues identified at this time.    Thank you,    Kristie Chauhan, PharmD  07/07/21  15:46 EDT

## 2021-07-07 NOTE — THERAPY TREATMENT NOTE
Inpatient Rehabilitation - Physical Therapy Treatment Note        Elie     Patient Name: Chance Lopez  : 1955  MRN: [id number]    Today's Date: 2021                    Admit Date: 2021      Visit Dx: No diagnosis found.    Patient Active Problem List   Diagnosis   • Colon cancer screening   • Gastritis   • CVA (cerebral vascular accident) (CMS/HCC)       Past Medical History:   Diagnosis Date   • Diabetes mellitus (CMS/HCC)    • Diarrhea    • Full dentures     INSTRUCTED NO ADHESIVES THE DOS. REPORTS USUALLY ONLY WEARS UPPER PLATE.   • Hearing loss     REPORTS MORE LOSS ON LEFT SIDE BUT THAT HE HAS BILATERAL LOSS. NO USE OF HEARING AIDS.   • Hepatitis     REPORTS AS A CHILD.  UNSURE IF TYPE A OR B.   • History of acute pancreatitis     REPORTS PRIOR TO    • History of gout    • Hypertension    • Seasonal allergies    • Stroke (CMS/HCC)    • Wears glasses     RX GLASSES       Past Surgical History:   Procedure Laterality Date   • COLONOSCOPY N/A 2018    Procedure: COLONOSCOPY;  Surgeon: Ha Sykes MD;  Location: [facility] ENDOSCOPY;  Service: Gastroenterology   • ENDOSCOPY N/A 2018    Procedure: ESOPHAGOGASTRODUODENOSCOPY with cold forcep biopsy;  Surgeon: Ha Sykes MD;  Location: [facility] ENDOSCOPY;  Service: Gastroenterology   • TOOTH EXTRACTION            PT ASSESSMENT (last 12 hours)      IRF PT Evaluation and Treatment     Row Name 21 1459          PT Time and Intention    Document Type  daily treatment  -RG     Mode of Treatment  individual therapy;physical therapy  -RG     Patient/Family/Caregiver Comments/Observations  Pt and nursing in agreement for skilled PT.  PT required verbal and tactile cues to stay on task and for task completion.   -     Row Name 21 1459          General Information    Existing Precautions/Restrictions  fall confusion  -RG     Row Name 21 1459          Cognition/Psychosocial    Affect/Mental Status (Cognitive)  confused   -RG     Follows Commands (Cognition)  verbal cues/prompting required;physical/tactile prompts required;increased processing time needed;repetition of directions required  -RG     Personal Safety Interventions  gait belt;nonskid shoes/slippers when out of bed;fall prevention program maintained  -RG     Row Name 07/07/21 1459          Pain Scale: FACES Pre/Post-Treatment    Pain: FACES Scale, Pretreatment  0-->no hurt  -RG     Posttreatment Pain Rating  0-->no hurt  -RG     Row Name 07/07/21 1459          Transfer Assessment/Treatment    Transfers  car transfer  -RG     Row Name 07/07/21 1459          Transfers    Bed-Chair [place] (Transfers)  verbal cues;nonverbal cues (demo/gesture);contact guard;standby assist  -RG     Chair-Bed [place] (Transfers)  verbal cues;nonverbal cues (demo/gesture);contact guard;standby assist  -RG     Assistive Device (Bed-Chair Transfers)  wheelchair  -RG     Sit-Stand [place] (Transfers)  contact guard;verbal cues;nonverbal cues (demo/gesture);standby assist  -RG     Stand-Sit [place] (Transfers)  contact guard;verbal cues;nonverbal cues (demo/gesture);standby assist  -RG     Row Name 07/07/21 1459          Chair-Bed Transfer    Assistive Device (Chair-Bed Transfers)  wheelchair  -RG     Row Name 07/07/21 1459          Sit-Stand Transfer    Assistive Device (Sit-Stand Transfers)  walker, front-wheeled  -RG     Row Name 07/07/21 1459          Stand-Sit Transfer    Assistive Device (Stand-Sit Transfers)  walker, front-wheeled  -RG     [facility] Name 07/07/21 1459          Gait/Stairs (Locomotion)    [place] Level (Gait)  contact guard;verbal cues;nonverbal cues (demo/gesture)  -RG     Assistive Device (Gait)  walker, front-wheeled  -RG     Distance in Feet (Gait)  320  -RG     Deviations/Abnormal Patterns (Gait)  ataxic;dee decreased;gait speed decreased;stride length decreased  -RG     [place] Level (Stairs)  contact guard  -RG     Handrail Location (Stairs)   both sides  -RG     Number of Steps (Stairs)  15  -RG     Ascending Technique (Stairs)  step-over-step  -RG     Descending Technique (Stairs)  step-over-step  -RG     Stairs, Impairments  motor control impaired;coordination impaired  -RG     Comment (Gait/Stairs)  safety awareness cues  -RG     Row Name 07/07/21 1459          Hip (Therapeutic Exercise)    Hip Strengthening (Therapeutic Exercise)  bilateral;flexion;aBduction;aDduction;marching while seated;marching while standing;sitting;standing;2 lb free weight;resistance band;green;10 repetitions;2 sets  -RG     Row Name 07/07/21 1459          Knee (Therapeutic Exercise)    Knee Strengthening (Therapeutic Exercise)  bilateral;flexion;extension;marching while seated;marching while standing;LAQ (long arc quad);hamstring curls;sitting;standing;2 lb free weight;resistance band;green;10 repetitions;2 sets  -RG     Row Name 07/07/21 1459          Ankle (Therapeutic Exercise)    Ankle Strengthening (Therapeutic Exercise)  bilateral;dorsiflexion;plantarflexion;sitting;10 repetitions;2 sets  -RG     Row Name 07/07/21 1459          Aerobic Exercise    Type (Aerobic Exercise)  recumbent stationary bike  -RG     Time Performed (Aerobic Exercise)  15  -RG     Row Name 07/07/21 1459          IRF PT Goals    Bed Mobility Goal Selection (PT-IRF)  bed mobility, PT goal 1  -RG     Transfer Goal Selection (PT-IRF)  transfers, PT goal 1  -RG     Gait (Walking Locomotion) Goal Selection (PT-IRF)  gait, PT goal 1  -RG     Row Name 07/07/21 1459          Bed Mobility Goal 1 (PT-IRF)    Activity/Assistive Device (Bed Mobility Goal 1, PT-IRF)  sit to supine/supine to sit  -RG     [place] Level (Bed Mobility Goal 1, PT-IRF)  independent  -RG     Time Frame (Bed Mobility Goal 1, PT-IRF)  by discharge  -RG     Row Name 07/07/21 1459          Transfer Goal 1 (PT-IRF)    Activity/Assistive Device (Transfer Goal 1, PT-IRF)  sit-to-stand/stand-to-sit;bed-to-chair/chair-to-bed  -RG      [place] Level (Transfer Goal 1, PT-IRF)  supervision required  -RG     Time Frame (Transfer Goal 1, PT-IRF)  by discharge  -RG     Row Name 07/07/21 1455          Gait/Walking Locomotion Goal 1 (PT-IRF)    Activity/Assistive Device (Gait/Walking Locomotion Goal 1, PT-IRF)  walker, rolling  -RG     Gait/Walking Locomotion Distance Goal 1 (PT-IRF)  300'  -RG     [place] Level (Gait/Walking Locomotion Goal 1, PT-IRF)  supervision required  -RG     Time Frame (Gait/Walking Locomotion Goal 1, PT-IRF)  by discharge  -RG     Row Name 07/07/21 1459          Positioning and Restraints    Pre-Treatment Position  in bed  -RG     Post Treatment Position  wheelchair  -RG     In Wheelchair  notified nsg;sitting;with OT  -RG     Row Name 07/07/21 1459          Therapy Assessment/Plan (PT)    Patient's Goals For Discharge  return home  -RG     Row Name 07/07/21 1458          Therapy Assessment/Plan (PT)    Rehab Potential/Prognosis (PT)  adequate, monitor progress closely  -RG     Frequency of Treatment (PT)  5 times per week  -RG     Estimated Duration of Therapy (PT)  1 week;2 weeks  -RG     Problem List (PT)  balance;cognition;coordination;mobility;strength  -RG     Activity Limitations Related to Problem List (PT)  unable to ambulate safely;unable to transfer safely  -RG     Row Name 07/07/21 1453          Daily Progress Summary (PT)    Impairments Still Limiting Function (PT)  strength decreased;coordination impaired;impaired functional activity tolerance;motor control impaired  -RG     Row Name 07/07/21 1453          Therapy Plan Review/Discharge Plan (PT)    Anticipated Equipment Needs at Discharge (PT Eval)  -- tbd  -RG     Expected Discharge Disposition (PT Eval)  home with home health care  -RG       User Key  (r) = Recorded By, (t) = Taken By, (c) = Cosigned By    Initials Name Provider Type    RG Robin Johnson PTA Physical Therapy Assistant           Physical Therapy Education                 Title: PT OT  SLP Therapies (Done)     Topic: Physical Therapy (Done)     Point: Mobility training (Done)     Learning Progress Summary           Patient Acceptance, E,D, VU,NR by RG at 7/7/2021 1517    Acceptance, E,TB, VU by RF at 7/6/2021 1545    Acceptance, E,TB, VU by RF at 7/5/2021 1613    Acceptance, E, VU,NR by LB at 7/3/2021 1357    Acceptance, E, VU,NR by LB at 7/2/2021 1524                   Point: Home exercise program (Done)     Learning Progress Summary           Patient Acceptance, E,D, VU,NR by RG at 7/7/2021 1517    Acceptance, E,TB, VU by RF at 7/6/2021 1545    Acceptance, E,TB, VU by RF at 7/5/2021 1613    Acceptance, E, VU,NR by LB at 7/3/2021 1357    Acceptance, E, VU,NR by LB at 7/2/2021 1524                   Point: Body mechanics (Done)     Learning Progress Summary           Patient Acceptance, E,D, VU,NR by RG at 7/7/2021 1517    Acceptance, E,TB, VU by RF at 7/6/2021 1545    Acceptance, E,TB, VU by RF at 7/5/2021 1613    Acceptance, E, VU,NR by LB at 7/3/2021 1357    Acceptance, E, VU,NR by LB at 7/2/2021 1524                   Point: Precautions (Done)     Learning Progress Summary           Patient Acceptance, E,D, VU,NR by RG at 7/7/2021 1517    Acceptance, E,TB, VU by RF at 7/6/2021 1545    Acceptance, E,TB, VU by RF at 7/5/2021 1613    Acceptance, E, VU,NR by LB at 7/3/2021 1357    Acceptance, E, VU,NR by LB at 7/2/2021 1524                               User Key     Initials Effective Dates Name Provider Type Discipline    LB 06/16/21 -  Linda Rodríguez, PT Physical Therapist PT    RF 06/16/21 -  Nuris Mccollum PTA Physical Therapy Assistant PT    RG 06/16/21 -  Robin Johnson PTA Physical Therapy Assistant PT                PT Recommendation and Plan    Frequency of Treatment (PT): 5 times per week  Anticipated Equipment Needs at Discharge (PT Eval):  (tbd)  Daily Progress Summary (PT)  Impairments Still Limiting Function (PT): strength decreased, coordination impaired, impaired  functional activity tolerance, motor control impaired               Time Calculation:     PT Charges     Row Name 07/07/21 1517             Time Calculation    Start Time  0745  -RG      Stop Time  0915  -RG      Time Calculation (min)  90 min  -RG      PT Received On  07/07/21  -RG         Time Calculation- PT    Total Timed Code Minutes- PT  90 minute(s)  -RG        User Key  (r) = Recorded By, (t) = Taken By, (c) = Cosigned By    Initials Name Provider Type     Robin Johnson PTA Physical Therapy Assistant          Therapy Charges for Today     Code Description Service Date Service Provider Modifiers Qty    08960197150 HC GAIT TRAINING EA 15 MIN 7/7/2021 Robin Johnson PTA GP 1    91719982010 HC PT THERAPEUTIC ACT EA 15 MIN 7/7/2021 Robin Johnson PTA GP 2    69417442587 HC PT THER PROC EA 15 MIN 7/7/2021 Robin Johnson PTA GP 3                   Robin Johnson PTA  7/7/2021

## 2021-07-07 NOTE — PROGRESS NOTES
Occupational Therapy:    Physical Therapy: Individual: 90 minutes.    Speech Language Pathology:    Signed by: Robin Johnson PTA

## 2021-07-07 NOTE — PROGRESS NOTES
Inpatient Rehabilitation Functional Measures Assessment    Functional Measures  RENETTA Eating:  RENETTA Grooming:  RENETTA Bathing:  RENETTA Upper Body Dressing:  RENETTA Lower Body Dressing:  RENETTA Toileting:    RENETTA Bladder Management  Level of Assistance:  Frequency/Number of Accidents this Shift:    RENETTA Bowel Management  Level of Assistance:  Frequency/Number of Accidents this Shift:    RENETTA Bed/Chair/Wheelchair Transfer:  RENETTA Toilet Transfer:  RENETTA Tub/Shower Transfer:    Previously Documented Mode of Locomotion at Discharge:  [facility] Expected Mode of Locomotion at Discharge:  [facility] Walk/Wheelchair:  [facility] Stairs:    [facility] Comprehension:  [facility] Expression:  [facility] Social Interaction:  [facility] Problem Solving:  [facility] Memory:    Therapy Mode Minutes  Occupational Therapy:  Physical Therapy:  Speech Language Pathology: Individual: 45 minutes.    Discharge Functional Goals:    Signed by: Samantha Meza SLP

## 2021-07-07 NOTE — PROGRESS NOTES
PROGRESS NOTE         Patient Identification:  Name:  Chance Lopez  Age:  65 y.o.  Sex:  male  :  1955  MRN:  [id number]  Visit Number:  [id number]  Primary Care Provider:  Ramos Gurrola MD         LOS: 6 days       ----------------------------------------------------------------------------------------------------------------------  Subjective       Chief Complaints:    Debility      Interval History:    65-year-old gentleman who was recently transferred from [facility] after CVA involving the bihemispheric MCA distribution    Seen while sitting up in bed eating breakfast.  Denies any complaints or issues today.  Overall the patient is doing very well, making good progress with therapy.    Participated with physical, occupational, speech therapy on : Performs transfer activities with standby assist and verbal/nonverbal cues; utilizes wheelchair/front wheeled walker for chair to bed, sit to stand, stand to sit transfer; ambulated 320 feet x 2 with front wheeled walker and contact-guard assistance with verbal/nonverbal cues; performed aerobic exercise on the recumbent stationary bike for 15 minutes on level 4; participated in ADL retraining/education; functional mobility; bilateral upper extremity therapy; required minimal to moderate assistance with toileting activities and minimal assistance with lower extremity dressing.    Review of Systems:    Constitutional: no fever, chills and night sweats.  Generalized fatigue.  Eyes: no eye drainage, itching or redness.  HEENT: no mouth sores, dysphagia or nose bleed.  Respiratory: no for shortness of breath, cough or production of sputum.  Cardiovascular: no chest pain, no palpitations, no orthopnea.  Gastrointestinal: no nausea, vomiting or diarrhea. No abdominal pain, hematemesis or rectal bleeding.  Genitourinary: no dysuria or polyuria.  Hematologic/lymphatic: no lymph node abnormalities, no easy bruising or easy  bleeding.  Musculoskeletal: no muscle or joint pain.  Skin: No rash and no itching.  Neurological: mild confusion  Behavioral/Psych: no depression or suicidal ideation.  Endocrine: no hot flashes.  Immunologic: negative.    ----------------------------------------------------------------------------------------------------------------------      Objective       Current [facility] Meds:  aspirin, 81 mg, Oral, Daily  atorvastatin, 40 mg, Oral, Nightly  carvedilol, 12.5 mg, Oral, BID With Meals  cetirizine, 10 mg, Oral, Daily  clopidogrel, 75 mg, Oral, Daily  enoxaparin, 40 mg, Subcutaneous, Q24H  fluticasone, 2 spray, Each Nare, Daily  insulin aspart, 10 Units, Subcutaneous, TID With Meals  insulin detemir, 15 Units, Subcutaneous, Nightly  multivitamin, 1 tablet, Oral, Daily  OLANZapine, 5 mg, Oral, Nightly  PARoxetine, 20 mg, Oral, Daily  sennosides-docusate, 2 tablet, Oral, BID         ----------------------------------------------------------------------------------------------------------------------    Vital Signs:  Temp:  [98 °F (36.7 °C)-98.2 °F (36.8 °C)] 98 °F (36.7 °C)  Heart Rate:  [64-72] 72  Resp:  [16-20] 16  BP: (117-136)/(66-74) 136/74  No data found.  SpO2 Percentage    07/06/21 0708 07/06/21 1900 07/07/21 0730   SpO2: 94% 98% 96%     SpO2:  [96 %-98 %] 96 %  on   ;   Device (Oxygen Therapy): room air    Body mass index is 24.39 kg/m².  Wt Readings from Last 3 Encounters:   07/01/21 79.3 kg (174 lb 13.2 oz)   01/04/19 83.6 kg (184 lb 3.2 oz)   12/14/18 83.9 kg (184 lb 15.5 oz)        Intake/Output Summary (Last 24 hours) at 7/7/2021 1018  Last data filed at 7/7/2021 0900  Gross per 24 hour   Intake 1320 ml   Output --   Net 1320 ml     Diet Soft Texture; Chopped; Thin; Consistent Carbohydrate  ----------------------------------------------------------------------------------------------------------------------      Physical Exam:    General Appearance: Awake and alert, sitting up in bed eating  breakfast.  Denies any complaints or issues this morning.    Head: normocephalic, without obvious abnormality and atraumatic    Eyes: lids and lashes normal, conjunctivae and sclerae normal, no icterus, no  pallor, corneas clear and PERRLA    Ears: ears appear intact with no abnormalities noted    Lungs: clear to auscultation, respirations regular, respirations even and  respirations unlabored. No accessory muscle use.     Heart:: regular rhythm & normal rate, normal S1, S2, no murmur, no gallop, no  rub and no click.  Chest wall with no abnormalities observed. PMI nondisplaced    Abdomen: normal bowel sounds in all quadrants, no masses, no hepatomegaly,  no splenomegaly, soft non-tender, no guarding and no rebound tenderness    Extremities: no edema, no cyanosis, no redness, no tenderness, no clubbing    Musculoskeletal: joints with no effusion nor erythema nor warmth.  Pedal pulses palpable and equal bilaterally    Skin: no bleeding, bruising or rash and no lesions noted    Lymph Nodes: no palpable adenopathy    Neurologic:    Mental Status: Patient is awake alert and able to follow commands. Mild confusion.   Sensory: Sensory overall seems to be intact in BLE and BUE.   Motor strength: Generalized weakness        ----------------------------------------------------------------------------------------------------------------------            Results from last 7 days   Lab Units 07/02/21  0206   WBC 10*3/mm3 5.12   HEMOGLOBIN g/dL 11.3*   HEMATOCRIT % 34.3*   MCV fL 99.7*   MCHC g/dL 32.9   PLATELETS 10*3/mm3 126*     Results from last 7 days   Lab Units 07/04/21  0117 07/03/21  0111 07/02/21  0206   SODIUM mmol/L 138  --  141   POTASSIUM mmol/L 4.8  --  4.2   MAGNESIUM mg/dL  --  2.1 1.6   CHLORIDE mmol/L 101  --  105   CO2 mmol/L 27.4  --  26.0   BUN mg/dL 20  --  15   CREATININE mg/dL 1.44*  --  1.45*   EGFR IF NONAFRICN AM mL/min/1.73 49*  --  49*   CALCIUM mg/dL 9.4  --  9.2   GLUCOSE mg/dL 304*  --  180*    ALBUMIN g/dL  --   --  3.44*   BILIRUBIN mg/dL  --   --  0.4   ALK PHOS U/L  --   --  90   AST (SGOT) U/L  --   --  27   ALT (SGPT) U/L  --   --  35   Estimated Creatinine Clearance: 57.4 mL/min (A) (by C-G formula based on SCr of 1.44 mg/dL (H)).  No results found for: AMMONIA    Glucose   Date/Time Value Ref Range Status   07/07/2021 0603 220 (H) 70 - 130 mg/dL Final     Comment:     Meter: [id number] : [id number] Elsahmicheal Batista   07/06/2021 2000 79 70 - 130 mg/dL Final     Comment:     Meter: [id number] : [id number] Laron Crook   07/06/2021 1725 163 (H) 70 - 130 mg/dL Final     Comment:     Meter: [id number] : [id number] GARCIA NAN   07/06/2021 1613 91 70 - 130 mg/dL Final     Comment:     Meter: [id number] : [id number] Cornell Dena   07/06/2021 1058 222 (H) 70 - 130 mg/dL Final     Comment:     Meter: [id number] : [id number] Bijan Neris   07/06/2021 0603 184 (H) 70 - 130 mg/dL Final     Comment:     Meter: [id number] : [id number] Domingo Crystal   07/05/2021 1951 159 (H) 70 - 130 mg/dL Final     Comment:     Meter: [id number] : [id number] Laron Crook   07/05/2021 1612 207 (H) 70 - 130 mg/dL Final     Comment:     Meter: [id number] : [id number] Bijan Cordon     No results found for: HGBA1C  No results found for: TSH, FREET4    No results found for: BLOODCX  No results found for: URINECX  No results found for: WOUNDCX  No results found for: STOOLCX  No results found for: RESPCX  Pain Management Panel    There is no flowsheet data to display.           ----------------------------------------------------------------------------------------------------------------------  Imaging Results (Last 24 Hours)       ** No results found for the last 24 hours. **            ----------------------------------------------------------------------------------------------------------------------    Assessment/Plan       Assessment/Plan     ASSESSMENT:    Status post CVA with  bihemispheric MCA infarctions  Diabetes mellitus  Acute kidney injury  Hypertension  Encephalopathy      PLAN:    Seen while sitting up in bed eating breakfast.  Denies any complaints or issues today.  Overall the patient is doing very well, making good progress with therapy.    Participated with physical, occupational, speech therapy on 7/6: Performs transfer activities with standby assist and verbal/nonverbal cues; utilizes wheelchair/front wheeled walker for chair to bed, sit to stand, stand to sit transfer; ambulated 320 feet x 2 with front wheeled walker and contact-guard assistance with verbal/nonverbal cues; performed aerobic exercise on the recumbent stationary bike for 15 minutes on level 4; participated in ADL retraining/education; functional mobility; bilateral upper extremity therapy; required minimal to moderate assistance with toileting activities and minimal assistance with lower extremity dressing.    Diabetes mellitus-- 15 units subcu nightly    Acute kidney injury--creatinine stable      Hypertension continue Coreg 12.5 mg twice daily     Recent issues with acute encephalopathy--continues to have mild confusion, awake and alert enough to follow commands. Patient currently on Paxil and Zyprexa        Code Status:   Code Status and Medical Interventions:   Ordered at: 07/01/21 1835     Level Of Support Discussed With:    Patient     Code Status:    CPR     Medical Interventions (Level of Support Prior to Arrest):    Full     Scribed for Chelsie Mccauley MD by SANTA Ribeiro. 7/7/2021  10:18 EDT       SANTA Ribeiro  07/07/21  10:18 EDT    Physician Attestation:    The documentation recorded by the scribe accurately reflects the service I personally performed and the decisions made by me.    Chelsie Mccauley MD  Infectious Diseases  07/07/21  10:18 EDT

## 2021-07-07 NOTE — PROGRESS NOTES
"Patient Assessment Instrument  Quality Indicators - Admission    Section B. Hearing, Speech Vision      Section C. Cognitive Patterns  Brief Interview for Mental Status (BIMS) was conducted.  Repetition of Three Words: Two words  Able to report correct year: Correct  Able to report correct month: Missed by more than 1 month or no answer  Able to report correct day of the week: Correct  Able to recall \"sock\": Yes, after cuing  Able to recall \"blue\": No, could not recall  Able to recall \"bed\": No, could not recall    BIMS SUMMARY SCORE: 7 Severe impairment Patient was able to complete the Brief  Interview for Mental Status    Section BL8767. Prior Functioning    Self Care: Patient completed the activities by him/herself, with or without an  assistive device, with no assistance from a helper.  Indoor Mobility: Patient completed the activities by him/herself, with or  without an assistive device, with no assistance from a helper.  Stairs: Patient completed the activities by him/herself, with or without an  assistive device, with no assistance from a helper.  Functional Cognition: Patient completed the activities by him/herself, with or  without an assistive device, with no assistance from a helper.    Section KP0507. Prior Device Use      Section OS4208. Self Care Performance      Section GB7825. Self Care Discharge Goals      Section HE2849. Mobility Performance      Section DZ7626. Mobility Discharge Goals      Section H. Bladder and Bowel  Bladder Continence: Incontinent daily.  Bowel Continence: Not rated (patient had an ostomy or did not have a bowel  movement for the entire 3 days).    Section I. Active Diagnosis  Comorbidities and Co-existing Conditions:   Diabetes Mellitus (DM) - e.g.,  diabetic retinopathy, nephropathy, and neuropathy).    Section J. Health Conditions  Patient has had two or more falls, or a fall with injury, in the past year.  Patient has not had major surgery during the 100 days prior to " admission.    Section K. Swallowing/Nutritional Status  Modiified food consistency/supervision (patient requires modified food or liquid  consistency and/or needs supervision during eating for safety).    Section M. Skin Conditions  Unhealed Pressure Ulcer/Injuries at Stage 1 or Higher on Admission:  No.    Section N. Medication    Potential Clinically Significant Medication Issues: No issues found during  review    Section O. Special Treatments, Procedures, and Programs  Patient did not receive total parenteral nutrition treatment at the time of  admission.    OPTIONAL BRANCH FOR TRACKING FALLS  Fall(s) During Shift: No falls.    Signed by: Nurse Pancho

## 2021-07-07 NOTE — SIGNIFICANT NOTE
07/06/21 1415   Plan   Plan Team conference held.  Spoke to son Ulysses [phone] about how pt is doing in therapy, plans for discharge on 7-14-21 and recommendation for pt to receive 24 hour assistance/supervision.  Son says he and his brother Obi and their spouses work, therefore, unable to provide 24 hour assistance.  Discussed options for caregiving/continued rehab including hiring caregiver(s) or SNF placement for continued rehab.  Ulysses will talk to Obi and follow-up with SS on 7-7-21 about discharge plans.   Patient/Family in Agreement with Plan yes

## 2021-07-07 NOTE — THERAPY TREATMENT NOTE
Inpatient Rehabilitation - Occupational Therapy Treatment Note     [place]     Patient Name: Chance Lopez  : 1955  MRN: [id number]    Today's Date: 2021                 Admit Date: 2021       No diagnosis found.    Patient Active Problem List   Diagnosis   • Colon cancer screening   • Gastritis   • CVA (cerebral vascular accident) (CMS/HCC)       Past Medical History:   Diagnosis Date   • Diabetes mellitus (CMS/HCC)    • Diarrhea    • Full dentures     INSTRUCTED NO ADHESIVES THE DOS. REPORTS USUALLY ONLY WEARS UPPER PLATE.   • Hearing loss     REPORTS MORE LOSS ON LEFT SIDE BUT THAT HE HAS BILATERAL LOSS. NO USE OF HEARING AIDS.   • Hepatitis     REPORTS AS A CHILD.  UNSURE IF TYPE A OR B.   • History of acute pancreatitis     REPORTS PRIOR TO    • History of gout    • Hypertension    • Seasonal allergies    • Stroke (CMS/HCC)    • Wears glasses     RX GLASSES       Past Surgical History:   Procedure Laterality Date   • COLONOSCOPY N/A 2018    Procedure: COLONOSCOPY;  Surgeon: Ha Sykes MD;  Location: [facility] ENDOSCOPY;  Service: Gastroenterology   • ENDOSCOPY N/A 2018    Procedure: ESOPHAGOGASTRODUODENOSCOPY with cold forcep biopsy;  Surgeon: Ha Sykes MD;  Location: [facility] ENDOSCOPY;  Service: Gastroenterology   • TOOTH EXTRACTION                  IRF OT ASSESSMENT FLOWSHEET (last 12 hours)      IRF OT Evaluation and Treatment     Row Name 21 1402          OT Time and Intention    Document Type  daily treatment  -LM     Mode of Treatment  occupational therapy  -LM     Patient Effort  good  -LM     Row Name 21 1402          General Information    Patient/Family/Caregiver Comments/Observations  Patient seen this date for light bue arom ta/therex and adl retraining/fxl mobility.  Patient participated in table top gmc/fmc and light strengthening tasks for bue.  Fxl transfer to bed from w/c with min/cga.   Frequent rest breaks.  -LM     Existing  Precautions/Restrictions  fall  -LM     Limitations/Impairments  safety/cognitive  -LM     Row Name 07/07/21 1402          Cognition/Psychosocial    Cognitive Function (Cognitive)  safety deficit  -LM     Safety Deficit (Cognitive)  minimal deficit;judgment;problem-solving;safety precautions awareness  -LM     Row Name 07/07/21 1402          Positioning and Restraints    Post Treatment Position  bed  -LM     In Bed  notified nsg;call light within reach;encouraged to call for assist;exit alarm on  -LM       User Key  (r) = Recorded By, (t) = Taken By, (c) = Cosigned By    Initials Name Effective Dates    LM Alva De La Torre, OT 06/16/21 -            Occupational Therapy Education                 Title: PT OT SLP Therapies (Done)     Topic: Occupational Therapy (Done)     Point: ADL training (Done)     Description:   Instruct learner(s) on proper safety adaptation and remediation techniques during self care or transfers.   Instruct in proper use of assistive devices.              Learning Progress Summary           Patient Acceptance, E,TB, VU by RF at 7/5/2021 1613                   Point: Home exercise program (Done)     Description:   Instruct learner(s) on appropriate technique for monitoring, assisting and/or progressing therapeutic exercises/activities.              Learning Progress Summary           Patient Acceptance, E,TB, VU by RF at 7/5/2021 1613                   Point: Precautions (Done)     Description:   Instruct learner(s) on prescribed precautions during self-care and functional transfers.              Learning Progress Summary           Patient Acceptance, E,TB, VU by RF at 7/5/2021 1613                   Point: Body mechanics (Done)     Description:   Instruct learner(s) on proper positioning and spine alignment during self-care, functional mobility activities and/or exercises.              Learning Progress Summary           Patient Acceptance, E,TB, VU by RF at 7/5/2021 1613                                User Key     Initials Effective Dates Name Provider Type Discipline    RF 06/16/21 -  Nuris Mcclolum, PTA Physical Therapy Assistant PT                    OT Recommendation and Plan                         Time Calculation:     Time Calculation- OT     Row Name 07/07/21 1404             Time Calculation- OT    OT Start Time  1000  -LM      OT Stop Time  1045  -LM      OT Time Calculation (min)  45 min  -LM      Total Timed Code Minutes- OT  45 minute(s)  -LM        User Key  (r) = Recorded By, (t) = Taken By, (c) = Cosigned By    Initials Name Provider Type     Alva De La Torre, OT Occupational Therapist        Therapy Charges for Today     Code Description Service Date Service Provider Modifiers Qty    66929734778 HC OT SELF CARE/MGMT/TRAIN EA 15 MIN 7/6/2021 Alva De La Torre OT GO 2    95680528993 HC OT NEUROMUSC RE EDUCATION EA 15 MIN 7/6/2021 Alva De La Torre, OT GO 2    92268710407 HC OT THERAPEUTIC ACT EA 15 MIN 7/6/2021 Alva De La Torre OT GO 2    21433561737 HC OT SELF CARE/MGMT/TRAIN EA 15 MIN 7/7/2021 Alva De La Torre, OT GO 1    87997696152 HC OT THER PROC EA 15 MIN 7/7/2021 Alva De La Torre OT GO 2                   Alva De La Torre OT  7/7/2021

## 2021-07-07 NOTE — PROGRESS NOTES
Occupational Therapy: Individual: 45 minutes.    Physical Therapy:    Speech Language Pathology:    Signed by: Gabi Marinelli, Occupational Therapist

## 2021-07-07 NOTE — THERAPY TREATMENT NOTE
Inpatient Rehabilitation - Speech Language Pathology Treatment Note  [facility]     Patient Name: Chance Lopez  : 1955  MRN: [id number]  Today's Date: 2021             Admit Date: 2021     Chance Lopez was seen this pm at bedside for continued speech/language/cognitive therapy. He was sleeping upon SLP entry, easily arouses to verbal stimulation. He is pleasant and interactive to participate in all tx tasks.        Long Term Goal:  Pt will demonstrate wfl speech language skills in all activities and w/ all communication partners to complete adls.      Short Term Goals:  1. Pt will recall various pictures/items to increase STM in 3/5 opp over 3 consecutive sessions w/ min cues.   * Up to 5 stimuli pictures presented w/ requests to recall up to 3. He is able to recall 2 w/o cues initially, 3 w/ cues. Second attempt 3 independently.      2. Pt will recall personal LTM information in 3/5 opp over 3 consecutive sessions w/ min cues.   *Mr. Lopez is able to verbally provide his name,  and age correctly. He verbally ids his son's and grandson's names.He is not able to verbally describe to me his previous work history, despite cues. He is evidenced w/ moderate circumlocations and perseverations in his attempts to provide this information.      3. Pt will complete following multi-step directives tasks related to adls in 3/5 opp over 3 consecutive sessions w/ min cues.   * He is able to follow 3 step commands w/ tangible objects w/ min cues 3/5 opp.      4. Pt will perform sequencing tasks related to adls in 3/5 opp over 3 consecutive sessions w/ min cues.   * Not addressed on this date.      5. Pt will perform convergent naming tasks w/ min cues in 3/5 opp over 3 consecutive sessions.   * He is able to name up to 3 objects w/ initial picture cue for broad categories. Min cues provided.      6. Pt will perform divergent naming tasks w/ min cues in approx 1 min in 3/5 opp over 3 consecutive sessions.   *  He is  able to name up to 3 objects w/ initial picture cue for broad categories. Min cues provided    7. Pt will perform graphic tasks related to adls w/ min cues in 3/5 opp over 3 consecutive sessions.   * Not addressed today     8. Pt will demonstrate topic maintenance across session w/ min cues in 3/5 opp over 3 consecutive sessions.    * Mr. Lopez maintained topics of hunting, fishing, food w/o cues and in appropriate contexts for approximatley 5 minutes each topic. He independently transitions appropriately to new topic.     9. Pt will label items/pictures presented in 3/5 opp to decrease anomia w/ min cues over 3 consecutive sessions.   * Confrontation naming of common nouns in pictures at 100% w/o cues.     New Goals:   10. Pt will respond to simple/moderate OE questions pertaining to adls w/ moderate cues 3/5 opp over 3 consecutive sessions.        Visit Dx:  No diagnosis found.  Patient Active Problem List   Diagnosis   • Colon cancer screening   • Gastritis   • CVA (cerebral vascular accident) (CMS/HCC)     Past Medical History:   Diagnosis Date   • Diabetes mellitus (CMS/HCC)    • Diarrhea    • Full dentures     INSTRUCTED NO ADHESIVES THE DOS. REPORTS USUALLY ONLY WEARS UPPER PLATE.   • Hearing loss     REPORTS MORE LOSS ON LEFT SIDE BUT THAT HE HAS BILATERAL LOSS. NO USE OF HEARING AIDS.   • Hepatitis     REPORTS AS A CHILD.  UNSURE IF TYPE A OR B.   • History of acute pancreatitis     REPORTS PRIOR TO 2000   • History of gout    • Hypertension    • Seasonal allergies    • Stroke (CMS/HCC)    • Wears glasses     RX GLASSES     Past Surgical History:   Procedure Laterality Date   • COLONOSCOPY N/A 11/26/2018    Procedure: COLONOSCOPY;  Surgeon: Ha Sykes MD;  Location: [facility] ENDOSCOPY;  Service: Gastroenterology   • ENDOSCOPY N/A 11/26/2018    Procedure: ESOPHAGOGASTRODUODENOSCOPY with cold forcep biopsy;  Surgeon: Ha Sykes MD;  Location: [facility] ENDOSCOPY;  Service: Gastroenterology   • TOOTH  EXTRACTION        EDUCATION  The patient has been educated in the following areas:     Cognitive Impairment Communication Impairment.    Impression: Mr. Lopez continues to make progress towards his goals.     SLP Recommendation and Plan   Continue current POC to allow for maximal opportunities for improvement.     Thank you   Samantha Meza M.S, CCC/SLP                                                           Time Calculation:     Time Calculation- SLP     Time Calculation- SLP     Row Name 07/07/21 1556      SLP Start Time  1340  -Recorded by: TATO     SLP Stop Time  1420  -Recorded by: TATO     SLP Time Calculation (min)  40 min  -Recorded by: TATO     SLP - Next Appointment  07/08/21  -Recorded by: TATO             User Key     Initials Name Provider Type    Samantha Alba, MS CCC-SLP Speech and Language Pathologist          Therapy Charges for Today     Code Description Service Date Service Provider Modifiers Qty    [id number]  ST TREATMENT SPEECH 3 7/7/2021 Samantha Meza, MS CCC-SLP GN 1                     Samantha Meza MS CCC-SLP  7/7/2021

## 2021-07-07 NOTE — PROGRESS NOTES
Occupational Therapy: Individual: 90 minutes.    Physical Therapy:    Speech Language Pathology:    Signed by: Nurse Pancho

## 2021-07-07 NOTE — PROGRESS NOTES
PPS CMG Coordinator  Inpatient Rehabilitation Admission    Ethnic Group: White.  Marital Status:  Marital Status: .    IRF Admission Date:  07/01/2021  Admission Class: Initial Rehab.  Admit From:  [facility]    Pre-Hospital Living: Home. Pre-Hospital Living  With: (1) Alone.    Payment Sources: Primary: Medicare - Medicare Advantage  Secondary: Not Listed.  Impairment Group: 01.9 Other Stroke  Date of Onset of Impairment: 05/27/2021    Etiologic Diagnosis Code(s):  Rank Code      Description  1    I63.9     Cerebral infarction, unspecified    Comorbidities:  Rank Code      Description    1    N17.9     Acute kidney failure, unspecified  2    G93.40    Encephalopathy, unspecified  3    R13.10    Dysphagia, unspecified  4    E11.9     Type 2 diabetes mellitus without complications  5    Z79.4     Long term (current) use of insulin  6    I10       Essential (primary) hypertension  7    H91.90    Unspecified hearing loss, unspecified ear    Height on Admission: 71 inches.  Weight on Admission: 175 pounds.    Are there any arthritis conditions recorded for Impairment Group, Etiologic  Diagnosis, or Comorbid Conditions that meet all of the regulatory requirements  for IRF classification (in 42 .29(b)(2)(x), (xi), and xii))?  No    RENETTA Bladder Accidents:  0 - Accidents.  Bladder Score = 1.  Five (5) or more  bladder accidents.  RENETTA Bowel Accident: 0 -Accidents.  Bowel Score = 6. Patient has no accidents, but uses a device/medications. BOWEL  MED    Signed by: Nurse Pancho

## 2021-07-07 NOTE — PROGRESS NOTES
Case Management  Inpatient Rehabilitation Team Conference    Conference Date/Time: 7/6/2021 6:37:46 AM    Team Conference Attendees:  Dr. Chelsie Mccauley, MD Peace Mckenzie, JR Dumas, PT  Farnaz Camejo, OT  Claribel Lovell, SLP    Demographics            Age: 65Y            Gender: Male    Admission Date: 7/1/2021 5:54:00 PM  Rehabilitation Diagnosis:  Acute ischemic stroke of left periventricular  occipital, acute encephalopathy  Comorbidities:      Plan of Care  Anticipated Discharge Date/Estimated Length of Stay: 7-10 days  Anticipated Discharge Destination: Community discharge with assistance  Discharge Plan : Pt plans to return home with his sons/DIL's helping at home.  Medical Necessity Expected Level Rationale: Good  Intensity and Duration: an average of 3 hours/5 days per week  Medical Supervision and 24 Hour Rehab Nursing: x  Physical Therapy: x  PT Intensity/Duration: 1-1.5 hrs/day, 5-6 days/week  Occupational Therapy: x  OT Intensity/Duration: 1-1.5 hrs/day, 5-6 days/week  Speech and Language Therapy: x  SLP Intensity/Duration: 0.5-1 hr/day, 3-5 days/week  Social Work: x  Therapeutic Recreation: x  Updated (if changes indicated)    Anticipated Discharge Date/Estimated Length of Stay:   7-14-21      Discharge Plan of Care:    Based on the patient's medical and functional status, their prognosis and  expected level of functional improvement is: Fair-Good      Interdisciplinary Problem/Goals/Status  Mobility    [PT] Bed/Chair/Wheelchair(Active)  Current Status(07/02/2021): CGA  Weekly Goal(07/09/2021): Sup  Discharge Goal: Sup    [PT] Walk(Active)  Current Status(07/02/2021): amb 160' RW CGA  Weekly Goal(07/09/2021): amb 300' RW Sup  Discharge Goal: amb 300' RW Sup        Pain    [RN] Pain Management(Active)  Current Status(07/01/2021): Potential for pain  Weekly Goal(07/22/2021): No pain this week  Discharge Goal: No pain        Safety    [RN] Potential for Injury(Active)  Current  Status(07/01/2021): At risk for injury  Weekly Goal(07/22/2021): No injury this week  Discharge Goal: No injury        Self Care    [OT] Toileting(Active)  Current Status(07/05/2021): Min A  Weekly Goal(07/13/2021): CGA  Discharge Goal: sup    Comments: Pt plans to return home with sons providing assistance.    Signed by: RADHA Rodriguez    Physician CoSigned By: Chelsie Mccauley 07/07/2021 05:48:36

## 2021-07-07 NOTE — SIGNIFICANT NOTE
07/07/21 0915   Plan   Plan SS received call from son Ulysses who states pt will need short-term SNF placement and they prefer [facility], [facility] or [facility].  [phone] Ashley who states being in-network with pt's insurance and says to fax referral for review.   Patient/Family in Agreement with Plan yes

## 2021-07-08 LAB
GLUCOSE BLDC GLUCOMTR-MCNC: 132 MG/DL (ref 70–130)
GLUCOSE BLDC GLUCOMTR-MCNC: 137 MG/DL (ref 70–130)
GLUCOSE BLDC GLUCOMTR-MCNC: 166 MG/DL (ref 70–130)
GLUCOSE BLDC GLUCOMTR-MCNC: 208 MG/DL (ref 70–130)
GLUCOSE BLDC GLUCOMTR-MCNC: 95 MG/DL (ref 70–130)

## 2021-07-08 PROCEDURE — 97530 THERAPEUTIC ACTIVITIES: CPT | Performed by: OCCUPATIONAL THERAPIST

## 2021-07-08 PROCEDURE — 97110 THERAPEUTIC EXERCISES: CPT

## 2021-07-08 PROCEDURE — 97530 THERAPEUTIC ACTIVITIES: CPT

## 2021-07-08 PROCEDURE — 82962 GLUCOSE BLOOD TEST: CPT

## 2021-07-08 PROCEDURE — 63710000001 INSULIN ASPART PER 5 UNITS: Performed by: FAMILY MEDICINE

## 2021-07-08 PROCEDURE — 92507 TX SP LANG VOICE COMM INDIV: CPT

## 2021-07-08 PROCEDURE — 63710000001 INSULIN DETEMIR PER 5 UNITS: Performed by: INTERNAL MEDICINE

## 2021-07-08 PROCEDURE — 97116 GAIT TRAINING THERAPY: CPT

## 2021-07-08 PROCEDURE — 97110 THERAPEUTIC EXERCISES: CPT | Performed by: OCCUPATIONAL THERAPIST

## 2021-07-08 PROCEDURE — 25010000002 ENOXAPARIN PER 10 MG: Performed by: FAMILY MEDICINE

## 2021-07-08 RX ADMIN — ASPIRIN 81 MG: 81 TABLET, COATED ORAL at 08:45

## 2021-07-08 RX ADMIN — INSULIN ASPART 10 UNITS: 100 INJECTION, SOLUTION INTRAVENOUS; SUBCUTANEOUS at 12:04

## 2021-07-08 RX ADMIN — CETIRIZINE HYDROCHLORIDE 10 MG: 10 TABLET, FILM COATED ORAL at 08:45

## 2021-07-08 RX ADMIN — FLUTICASONE PROPIONATE 2 SPRAY: 50 SPRAY, METERED NASAL at 08:44

## 2021-07-08 RX ADMIN — DOCUSATE SODIUM 50 MG AND SENNOSIDES 8.6 MG 2 TABLET: 8.6; 5 TABLET, FILM COATED ORAL at 20:21

## 2021-07-08 RX ADMIN — DOCUSATE SODIUM 50 MG AND SENNOSIDES 8.6 MG 2 TABLET: 8.6; 5 TABLET, FILM COATED ORAL at 08:45

## 2021-07-08 RX ADMIN — INSULIN ASPART 10 UNITS: 100 INJECTION, SOLUTION INTRAVENOUS; SUBCUTANEOUS at 17:31

## 2021-07-08 RX ADMIN — ENOXAPARIN SODIUM 40 MG: 40 INJECTION SUBCUTANEOUS at 20:20

## 2021-07-08 RX ADMIN — Medication 1 TABLET: at 08:45

## 2021-07-08 RX ADMIN — ATORVASTATIN CALCIUM 40 MG: 40 TABLET, FILM COATED ORAL at 20:20

## 2021-07-08 RX ADMIN — CARVEDILOL 12.5 MG: 6.25 TABLET, FILM COATED ORAL at 17:31

## 2021-07-08 RX ADMIN — OLANZAPINE 5 MG: 5 TABLET, FILM COATED ORAL at 20:21

## 2021-07-08 RX ADMIN — INSULIN ASPART 10 UNITS: 100 INJECTION, SOLUTION INTRAVENOUS; SUBCUTANEOUS at 08:44

## 2021-07-08 RX ADMIN — CLOPIDOGREL 75 MG: 75 TABLET, FILM COATED ORAL at 08:45

## 2021-07-08 RX ADMIN — CARVEDILOL 12.5 MG: 6.25 TABLET, FILM COATED ORAL at 08:45

## 2021-07-08 RX ADMIN — INSULIN DETEMIR 15 UNITS: 100 INJECTION, SOLUTION SUBCUTANEOUS at 20:58

## 2021-07-08 RX ADMIN — PAROXETINE HYDROCHLORIDE 20 MG: 20 TABLET, FILM COATED ORAL at 08:45

## 2021-07-08 NOTE — SIGNIFICANT NOTE
07/08/21 0548   Plan   Plan Spoke to Ashley with [facility] and [facility] [phone] who requests SS re-send referral with updated therapy notes and they will request PA from insurance today.  Faxed face sheet, H&P, PT/OT/SLP notes, MD progress note, labs, medication list and active orders to [phone].  Will follow.

## 2021-07-08 NOTE — PROGRESS NOTES
Occupational Therapy: Individual: 85 minutes.    Physical Therapy:    Speech Language Pathology:    Signed by: Farnaz Camejo, Occupational Therapist

## 2021-07-08 NOTE — THERAPY TREATMENT NOTE
Inpatient Rehabilitation - Occupational Therapy Treatment Note     [place]     Patient Name: Chance Lopez  : 1955  MRN: [id number]    Today's Date: 2021                 Admit Date: 2021       No diagnosis found.    Patient Active Problem List   Diagnosis   • Colon cancer screening   • Gastritis   • CVA (cerebral vascular accident) (CMS/HCC)       Past Medical History:   Diagnosis Date   • Diabetes mellitus (CMS/HCC)    • Diarrhea    • Full dentures     INSTRUCTED NO ADHESIVES THE DOS. REPORTS USUALLY ONLY WEARS UPPER PLATE.   • Hearing loss     REPORTS MORE LOSS ON LEFT SIDE BUT THAT HE HAS BILATERAL LOSS. NO USE OF HEARING AIDS.   • Hepatitis     REPORTS AS A CHILD.  UNSURE IF TYPE A OR B.   • History of acute pancreatitis     REPORTS PRIOR TO    • History of gout    • Hypertension    • Seasonal allergies    • Stroke (CMS/HCC)    • Wears glasses     RX GLASSES       Past Surgical History:   Procedure Laterality Date   • COLONOSCOPY N/A 2018    Procedure: COLONOSCOPY;  Surgeon: Ha Sykes MD;  Location: [facility] ENDOSCOPY;  Service: Gastroenterology   • ENDOSCOPY N/A 2018    Procedure: ESOPHAGOGASTRODUODENOSCOPY with cold forcep biopsy;  Surgeon: Ha Sykes MD;  Location: [facility] ENDOSCOPY;  Service: Gastroenterology   • TOOTH EXTRACTION                  IRF OT ASSESSMENT FLOWSHEET (last 12 hours)      IRF OT Evaluation and Treatment     Row Name 21 1600 21 1239       OT Time and Intention    Document Type  daily treatment  -AH  daily treatment  -CJ    Mode of Treatment  individual therapy;occupational therapy  -AH  occupational therapy  -CJ    Patient Effort  good  -AH  --    Symptoms Noted During/After Treatment  --  none  -CJ    Row Name 21 1600 21 1239       General Information    Patient/Family/Caregiver Comments/Observations  patient agreeable to  therapy  -AH  agreeable to therapy  -CJ    Existing Precautions/Restrictions  fall  -AH  fall  -CJ     Limitations/Impairments  --  safety/cognitive  -    Row Name 07/08/21 1239          Cognition/Psychosocial    Follows Commands (Cognition)  verbal cues/prompting required;repetition of directions required  -     Row Name 07/08/21 1600 07/08/21 1239       Motor Skills    Motor Skills  coordination;therapeutic exercise;neuro-muscular function  -  --    Motor Control/Coordination Interventions  --  therapeutic exercise/ROM;gross motor coordination activities;fine motor manipulation/dexterity activities BUE ther ex/act, bilat coord ex, GMC/FMC, hand exerciser  -    Therapeutic Exercise  -- BUE UE Bike, gmc,fmc, strengthening, functional endurance  -  --    Row Name 07/08/21 1239          Positioning and Restraints    Post Treatment Position  wheelchair  -     In Wheelchair  sitting;with SLP  -       User Key  (r) = Recorded By, (t) = Taken By, (c) = Cosigned By    Initials Name Effective Dates     Farnaz Camejo, OT 06/16/21 -      Ambreen Marinelli, OT 06/16/21 -            Occupational Therapy Education                 Title: PT OT SLP Therapies (In Progress)     Topic: Occupational Therapy (In Progress)     Point: ADL training (In Progress)     Description:   Instruct learner(s) on proper safety adaptation and remediation techniques during self care or transfers.   Instruct in proper use of assistive devices.              Learning Progress Summary           Patient Acceptance, E,D, NR by  at 7/8/2021 1245    Acceptance, E,TB, VU by  at 7/5/2021 1613                   Point: Home exercise program (Done)     Description:   Instruct learner(s) on appropriate technique for monitoring, assisting and/or progressing therapeutic exercises/activities.              Learning Progress Summary           Patient Acceptance, E,TB, VU by  at 7/5/2021 1613                   Point: Precautions (In Progress)     Description:   Instruct learner(s) on prescribed precautions during self-care and functional  transfers.              Learning Progress Summary           Patient Acceptance, E,D, NR by  at 7/8/2021 1245    Acceptance, E,TB, VU by  at 7/5/2021 1613                   Point: Body mechanics (Done)     Description:   Instruct learner(s) on proper positioning and spine alignment during self-care, functional mobility activities and/or exercises.              Learning Progress Summary           Patient Acceptance, E,TB, VU by  at 7/5/2021 1613                               User Key     Initials Effective Dates Name Provider Type Discipline     06/16/21 -  Farnaz Camejo, OT Occupational Therapist OT     06/16/21 -  Nuris Mccollum PTA Physical Therapy Assistant PT                    OT Recommendation and Plan    Planned Therapy Interventions (OT): activity tolerance training, adaptive equipment training, BADL retraining, neuromuscular control/coordination retraining, patient/caregiver education/training, ROM/therapeutic exercise, strengthening exercise, transfer/mobility retraining                    Time Calculation:     Time Calculation- OT     Row Name 07/08/21 1652 07/08/21 1243          Time Calculation-     OT Start Time  0915  -  1000  -     OT Stop Time  1000  -  1040  -     OT Time Calculation (min)  45 min  -  40 min  -     Total Timed Code Minutes- OT  --  40 minute(s)  -       User Key  (r) = Recorded By, (t) = Taken By, (c) = Cosigned By    Initials Name Provider Type     Farnaz Camejo, OT Occupational Therapist     Ambreen Marinelli OT Occupational Therapist        Therapy Charges for Today     Code Description Service Date Service Provider Modifiers Qty    82679460513 HC OT THERAPEUTIC ACT EA 15 MIN 7/7/2021 Ambreen Marinelli OT GO 2    04625539792 HC OT THER PROC EA 15 MIN 7/7/2021 Ambreen Marinelli OT GO 1    20214427531 HC OT THER PROC EA 15 MIN 7/8/2021 Ambreen Marinelli OT GO 2    57242562469 HC OT THERAPEUTIC ACT EA 15 MIN 7/8/2021  Isis, Ambreen sAhley, OT GO 1                   Ambreen Mairnelli, OT  7/8/2021

## 2021-07-08 NOTE — THERAPY TREATMENT NOTE
Inpatient Rehabilitation - Speech Language Pathology Treatment Note  [facility]     Patient Name: Chance Lopez  : 1955  MRN: [id number]  Today's Date: 2021             Admit Date: 2021     Chance Lopez was seen this pm in patient room for continued speech/language/cognitive therapy.  He is pleasant and interactive to participate in all tx tasks.        Long Term Goal:  Pt will demonstrate wfl speech language skills in all activities and w/ all communication partners to complete adls.      Short Term Goals:  1. Pt will recall various pictures/items to increase STM in 3/5 opp over 3 consecutive sessions w/ min cues.   * 5 stimuli objects presented w/ requests to recall. He is able to recall 1 w/o cues initially, 3 w/ min cues, 1 w/ mod cues.       2. Pt will recall personal LTM information in 3/5 opp over 3 consecutive sessions w/ min cues.   *Mr. Lopez is able to verbally provide his name correctly. Min-mod cues required to provide  and age.  He is able to independently recall 3/4 grandchildren's names. Recalls other grandchild's name w/ min cues.      3. Pt will complete following multi-step directives tasks related to adls in 3/5 opp over 3 consecutive sessions w/ min cues.   * No specifically targeted on this date.     4. Pt will perform sequencing tasks related to adls in 3/5 opp over 3 consecutive sessions w/ min cues.   * 2 steps of familiar task. (laundry in to wash)      5. Pt will perform convergent naming tasks w/ min cues in 3/5 opp over 3 consecutive sessions.   * Not specifically addressed on this date.     6. Pt will perform divergent naming tasks w/ min cues in approx 1 min in 3/5 opp over 3 consecutive sessions.   *  Narrow category of garden foods (pt reports he raises a garden): 2 items named independently in approx 2 min.  W/ min-mod cues pt able to name 3 additional.     7. Pt will perform graphic tasks related to adls w/ min cues in 3/5 opp over 3 consecutive sessions.   * Not  directly addressed     8. Pt will demonstrate topic maintenance across session w/ min cues in 3/5 opp over 3 consecutive sessions.    * Mr. Lopez maintained topics of family, teagan jam, daily activities w/o cues and in appropriate contexts for approximatley 5 minutes each topic. He independently transitions appropriately to new topic.     9. Pt will label items/pictures presented in 3/5 opp to decrease anomia w/ min cues over 3 consecutive sessions.   * Not addressed on this date.     10. Pt will respond to simple/moderate OE questions pertaining to adls w/ moderate cues 3/5 opp over 3 consecutive sessions.   * responds to simple OE questions pertaining to adls w/ mod cues in 2/5 opp.       ----------------------------------------------------------------------------------------------------      DYSPHAGIA THERAPY PLAN OF CARE:     Cahnce Lopez was seen this am in speech therapy office for formal therapy.      Long Term Goal:  Pt will accept least restrictive diet tolerance w/o overt s/s aspiration.      Short Term Goals:     1. Pt will perform OROM/SHANA exercises x3 sets x10 reps w/ min cues.  * Not addressed on this date.     2. Pt will perform compensatory techniques during meals w/ min cues.  * Discussed w/ pt regarding techniques during meals. Assisted w/ tray set up.            Thank you-  Claribel Lovell M.S., CFY-SLP     Visit Dx:  No diagnosis found.  Patient Active Problem List   Diagnosis   • Colon cancer screening   • Gastritis   • CVA (cerebral vascular accident) (CMS/HCC)     Past Medical History:   Diagnosis Date   • Diabetes mellitus (CMS/HCC)    • Diarrhea    • Full dentures     INSTRUCTED NO ADHESIVES THE DOS. REPORTS USUALLY ONLY WEARS UPPER PLATE.   • Hearing loss     REPORTS MORE LOSS ON LEFT SIDE BUT THAT HE HAS BILATERAL LOSS. NO USE OF HEARING AIDS.   • Hepatitis     REPORTS AS A CHILD.  UNSURE IF TYPE A OR B.   • History of acute pancreatitis     REPORTS PRIOR TO 2000   • History of gout    •  Hypertension    • Seasonal allergies    • Stroke (CMS/HCC)    • Wears glasses     RX GLASSES     Past Surgical History:   Procedure Laterality Date   • COLONOSCOPY N/A 11/26/2018    Procedure: COLONOSCOPY;  Surgeon: Ha Sykes MD;  Location: [facility] ENDOSCOPY;  Service: Gastroenterology   • ENDOSCOPY N/A 11/26/2018    Procedure: ESOPHAGOGASTRODUODENOSCOPY with cold forcep biopsy;  Surgeon: Ha Sykes MD;  Location: [facility] ENDOSCOPY;  Service: Gastroenterology   • TOOTH EXTRACTION        EDUCATION  The patient has been educated in the following areas:     Cognitive Impairment Communication Impairment.    Impression: Mr. Lopez continues to make progress towards his goals.     SLP Recommendation and Plan   Continue current POC to allow for maximal opportunities for improvement.     Thank you   Claribel Lovell M.S., KITTY-SLP                   Time Calculation:     Time Calculation- SLP     Row Name 07/08/21 1142             Time Calculation- SLP    SLP Start Time  1050  -      SLP Stop Time  1130  -      SLP Time Calculation (min)  40 min  -      SLP - Next Appointment  07/09/21  -        User Key  (r) = Recorded By, (t) = Taken By, (c) = Cosigned By    Initials Name Provider Type    Claribel Correa MS, CFY-SLP Speech and Language Pathologist          Therapy Charges for Today     Code Description Service Date Service Provider Modifiers Qty    26283766119 HC ST TREATMENT SPEECH 3 7/8/2021 Claribel Lovell MS, CFY-SLP GN 1                     Claribel Lovell MS, KITTY-SLP  7/8/2021

## 2021-07-08 NOTE — SIGNIFICANT NOTE
07/08/21 7916   Plan   Plan Contacted [facility] [phone] per Ashley who states SW is not available and  is out of the building but will return soon.  Ashley says she or  will follow-up with SS about PA request.

## 2021-07-08 NOTE — SIGNIFICANT NOTE
07/08/21 [facility]   Plan   Plan Contacted [facility] [phone] per Therese who says pt was approved clinically for admission pending financial review and submitting PA to insurance.  Therese says to call back in the afternoon regarding status of this process.

## 2021-07-08 NOTE — THERAPY TREATMENT NOTE
Inpatient Rehabilitation - Physical Therapy Treatment Note        [place]     Patient Name: Chance Lopez  : 1955  MRN: [id number]    Today's Date: 2021                    Admit Date: 2021      Visit Dx: No diagnosis found.    Patient Active Problem List   Diagnosis   • Colon cancer screening   • Gastritis   • CVA (cerebral vascular accident) (CMS/HCC)       Past Medical History:   Diagnosis Date   • Diabetes mellitus (CMS/HCC)    • Diarrhea    • Full dentures     INSTRUCTED NO ADHESIVES THE DOS. REPORTS USUALLY ONLY WEARS UPPER PLATE.   • Hearing loss     REPORTS MORE LOSS ON LEFT SIDE BUT THAT HE HAS BILATERAL LOSS. NO USE OF HEARING AIDS.   • Hepatitis     REPORTS AS A CHILD.  UNSURE IF TYPE A OR B.   • History of acute pancreatitis     REPORTS PRIOR TO    • History of gout    • Hypertension    • Seasonal allergies    • Stroke (CMS/HCC)    • Wears glasses     RX GLASSES       Past Surgical History:   Procedure Laterality Date   • COLONOSCOPY N/A 2018    Procedure: COLONOSCOPY;  Surgeon: Ha Sykes MD;  Location: [facility] ENDOSCOPY;  Service: Gastroenterology   • ENDOSCOPY N/A 2018    Procedure: ESOPHAGOGASTRODUODENOSCOPY with cold forcep biopsy;  Surgeon: Ha Sykes MD;  Location: [facility] ENDOSCOPY;  Service: Gastroenterology   • TOOTH EXTRACTION            PT ASSESSMENT (last 12 hours)      IRF PT Evaluation and Treatment     Row Name 21 1439          PT Time and Intention    Document Type  daily treatment  -RG     Mode of Treatment  individual therapy;physical therapy  -RG     Patient/Family/Caregiver Comments/Observations  agreement for skilled PT   -RG     Row Name 21 1439          General Information    Existing Precautions/Restrictions  fall confusion  -RG     Row Name 21 1439          Cognition/Psychosocial    Affect/Mental Status (Cognitive)  confused  -RG     Follows Commands (Cognition)  verbal cues/prompting required;physical/tactile prompts  required;increased processing time needed;repetition of directions required  -RG     Personal Safety Interventions  fall prevention program maintained;nonskid shoes/slippers when out of bed;gait belt  -RG     Row Name 07/08/21 1439          Pain Scale: FACES Pre/Post-Treatment    Pain: FACES Scale, Pretreatment  0-->no hurt  -RG     Posttreatment Pain Rating  0-->no hurt  -RG     Row Name 07/08/21 1439          Transfer Assessment/Treatment    Transfers  car transfer  -RG     Row Name 07/08/21 1439          Transfers    Bed-Chair [place] (Transfers)  verbal cues;nonverbal cues (demo/gesture);contact guard;standby assist  -RG     Chair-Bed [place] (Transfers)  verbal cues;nonverbal cues (demo/gesture);contact guard;standby assist  -RG     Assistive Device (Bed-Chair Transfers)  wheelchair  -RG     Sit-Stand [place] (Transfers)  contact guard;verbal cues;nonverbal cues (demo/gesture);standby assist  -RG     Stand-Sit [place] (Transfers)  contact guard;verbal cues;nonverbal cues (demo/gesture);standby assist  -RG     Row Name 07/08/21 1439          Chair-Bed Transfer    Assistive Device (Chair-Bed Transfers)  wheelchair  -RG     Row Name 07/08/21 1439          Sit-Stand Transfer    Assistive Device (Sit-Stand Transfers)  walker, front-wheeled  -RG     Row Name 07/08/21 1439          Stand-Sit Transfer    Assistive Device (Stand-Sit Transfers)  walker, front-wheeled  -RG     Row Name 07/08/21 1439          Gait/Stairs (Locomotion)    [place] Level (Gait)  contact guard;verbal cues;nonverbal cues (demo/gesture)  -RG     Assistive Device (Gait)  walker, front-wheeled  -RG     Distance in Feet (Gait)  320  -RG     Deviations/Abnormal Patterns (Gait)  ataxic;dee decreased;gait speed decreased;stride length decreased  -RG     [place] Level (Stairs)  contact guard  -RG     Handrail Location (Stairs)  both sides  -RG     Number of Steps (Stairs)  15  -RG     Ascending Technique (Stairs)   step-over-step  -RG     Descending Technique (Stairs)  step-over-step  -RG     Stairs, Impairments  motor control impaired;coordination impaired  -RG     Comment (Gait/Stairs)  decreased safety awareness  -RG     Row Name 07/08/21 1439          Hip (Therapeutic Exercise)    Hip Strengthening (Therapeutic Exercise)  bilateral;flexion;aBduction;aDduction;marching while seated;sitting;2 lb free weight;resistance band;green;10 repetitions;2 sets  -RG     Row Name 07/08/21 1439          Knee (Therapeutic Exercise)    Knee Strengthening (Therapeutic Exercise)  bilateral;extension;flexion;marching while standing;LAQ (long arc quad);hamstring curls;sitting;2 lb free weight;resistance band;green;10 repetitions;2 sets  -RG     Row Name 07/08/21 1439          Ankle (Therapeutic Exercise)    Ankle Strengthening (Therapeutic Exercise)  bilateral;dorsiflexion;plantarflexion;sitting;2 lb free weight;resistance band;green;10 repetitions;2 sets  -RG     Row Name 07/08/21 1439          Aerobic Exercise    Type (Aerobic Exercise)  recumbent stationary bike  -RG     Time Performed (Aerobic Exercise)  15  -RG     Comment, Aerobic Exercise (Therapeutic Exercise)  L 1  -RG     Row Name 07/08/21 1439          IRF PT Goals    Bed Mobility Goal Selection (PT-IRF)  bed mobility, PT goal 1  -RG     Transfer Goal Selection (PT-IRF)  transfers, PT goal 1  -RG     Gait (Walking Locomotion) Goal Selection (PT-IRF)  gait, PT goal 1  -RG     Row Name 07/08/21 1439          Bed Mobility Goal 1 (PT-IRF)    Activity/Assistive Device (Bed Mobility Goal 1, PT-IRF)  sit to supine/supine to sit  -RG     [place] Level (Bed Mobility Goal 1, PT-IRF)  independent  -RG     Time Frame (Bed Mobility Goal 1, PT-IRF)  by discharge  -RG     Row Name 07/08/21 1439          Transfer Goal 1 (PT-IRF)    Activity/Assistive Device (Transfer Goal 1, PT-IRF)  sit-to-stand/stand-to-sit;bed-to-chair/chair-to-bed  -RG     [place] Level (Transfer Goal 1, PT-IRF)   supervision required  -RG     Time Frame (Transfer Goal 1, PT-IRF)  by discharge  -RG     Row Name 07/08/21 1439          Gait/Walking Locomotion Goal 1 (PT-IRF)    Activity/Assistive Device (Gait/Walking Locomotion Goal 1, PT-IRF)  walker, rolling  -RG     Gait/Walking Locomotion Distance Goal 1 (PT-IRF)  300'  -RG     [place] Level (Gait/Walking Locomotion Goal 1, PT-IRF)  supervision required  -RG     Time Frame (Gait/Walking Locomotion Goal 1, PT-IRF)  by discharge  -RG     Row Name 07/08/21 1439          Positioning and Restraints    Pre-Treatment Position  in bed  -RG     Post Treatment Position  wheelchair  -RG     In Bed  notified nsg;sitting;with OT  -RG     Row Name 07/08/21 1439          Therapy Assessment/Plan (PT)    Patient's Goals For Discharge  return home  -RG     Row Name 07/08/21 1439          Therapy Assessment/Plan (PT)    Rehab Potential/Prognosis (PT)  adequate, monitor progress closely  -RG     Frequency of Treatment (PT)  5 times per week  -RG     Estimated Duration of Therapy (PT)  1 week;2 weeks  -RG     Problem List (PT)  balance;cognition;coordination;mobility;strength  -RG     Activity Limitations Related to Problem List (PT)  unable to ambulate safely;unable to transfer safely  -RG     Row Name 07/08/21 1439          Daily Progress Summary (PT)    Impairments Still Limiting Function (PT)  strength decreased;coordination impaired;impaired functional activity tolerance;motor control impaired  -RG     Row Name 07/08/21 1439          Therapy Plan Review/Discharge Plan (PT)    Anticipated Equipment Needs at Discharge (PT Eval)  -- tbd  -RG     Expected Discharge Disposition (PT Eval)  home with home health care  -RG       User Key  (r) = Recorded By, (t) = Taken By, (c) = Cosigned By    Initials Name Provider Type    RG Robin Johnson PTA Physical Therapy Assistant           Physical Therapy Education                 Title: PT OT SLP Therapies (In Progress)     Topic: Physical Therapy  (Done)     Point: Mobility training (Done)     Learning Progress Summary           Patient Acceptance, E,D, VU,NR by RG at 7/8/2021 1442    Acceptance, E,D, VU,NR by RG at 7/7/2021 1519    Acceptance, E,D, VU,NR by RG at 7/7/2021 1517    Acceptance, E,TB, VU by RF at 7/6/2021 1545    Acceptance, E,TB, VU by RF at 7/5/2021 1613    Acceptance, E, VU,NR by LB at 7/3/2021 1357    Acceptance, E, VU,NR by LB at 7/2/2021 1524                   Point: Home exercise program (Done)     Learning Progress Summary           Patient Acceptance, E,D, VU,NR by RG at 7/8/2021 1442    Acceptance, E,D, VU,NR by RG at 7/7/2021 1519    Acceptance, E,D, VU,NR by RG at 7/7/2021 1517    Acceptance, E,TB, VU by RF at 7/6/2021 1545    Acceptance, E,TB, VU by RF at 7/5/2021 1613    Acceptance, E, VU,NR by LB at 7/3/2021 1357    Acceptance, E, VU,NR by LB at 7/2/2021 1524                   Point: Body mechanics (Done)     Learning Progress Summary           Patient Acceptance, E,D, VU,NR by RG at 7/8/2021 1442    Acceptance, E,D, VU,NR by RG at 7/7/2021 1519    Acceptance, E,D, VU,NR by RG at 7/7/2021 1517    Acceptance, E,TB, VU by RF at 7/6/2021 1545    Acceptance, E,TB, VU by RF at 7/5/2021 1613    Acceptance, E, VU,NR by LB at 7/3/2021 1357    Acceptance, E, VU,NR by LB at 7/2/2021 1524                   Point: Precautions (Done)     Learning Progress Summary           Patient Acceptance, E,D, VU,NR by RG at 7/8/2021 1442    Acceptance, E,D, VU,NR by RG at 7/7/2021 1519    Acceptance, E,D, VU,NR by RG at 7/7/2021 1517    Acceptance, E,TB, VU by RF at 7/6/2021 1545    Acceptance, E,TB, VU by RF at 7/5/2021 1613    Acceptance, E, VU,NR by LB at 7/3/2021 1357    Acceptance, E, VU,NR by LB at 7/2/2021 1524                               User Key     Initials Effective Dates Name Provider Type Discipline    LB 06/16/21 -  Linda Rodríguez, PT Physical Therapist PT    RF 06/16/21 -  Nuris Mccollum PTA Physical Therapy Assistant PT    RG  06/16/21 -  Robin Johnson PTA Physical Therapy Assistant PT                PT Recommendation and Plan    Frequency of Treatment (PT): 5 times per week  Anticipated Equipment Needs at Discharge (PT Eval):  (tbd)  Daily Progress Summary (PT)  Impairments Still Limiting Function (PT): strength decreased, coordination impaired, impaired functional activity tolerance, motor control impaired               Time Calculation:     PT Charges     Row Name 07/08/21 1442             Time Calculation    Start Time  0745  -RG      Stop Time  0915  -RG      Time Calculation (min)  90 min  -RG      PT Received On  07/08/21  -RG         Time Calculation- PT    Total Timed Code Minutes- PT  90 minute(s)  -RG        User Key  (r) = Recorded By, (t) = Taken By, (c) = Cosigned By    Initials Name Provider Type    Robin Pinon PTA Physical Therapy Assistant          Therapy Charges for Today     Code Description Service Date Service Provider Modifiers Qty    46946815399 HC GAIT TRAINING EA 15 MIN 7/7/2021 Robin Johnson, AURELIO GP 1    97306914274 HC PT THERAPEUTIC ACT EA 15 MIN 7/7/2021 Robin Johnson PTA GP 2    16354735519 HC PT THER PROC EA 15 MIN 7/7/2021 Robin Johnosn, AURELIO GP 3    76217735384 HC GAIT TRAINING EA 15 MIN 7/8/2021 Robin Johnson, AURELIO GP 1    29344191456 HC PT THERAPEUTIC ACT EA 15 MIN 7/8/2021 Robin Johnson PTA GP 2    79400779407 HC PT THER PROC EA 15 MIN 7/8/2021 Robin Johnson PTA GP 3                   Robin Johnson PTA  7/8/2021

## 2021-07-08 NOTE — THERAPY TREATMENT NOTE
Inpatient Rehabilitation - Occupational Therapy Treatment Note     [place]     Patient Name: Chance Lopez  : 1955  MRN: [id number]    Today's Date: 2021                 Admit Date: 2021       No diagnosis found.    Patient Active Problem List   Diagnosis   • Colon cancer screening   • Gastritis   • CVA (cerebral vascular accident) (CMS/HCC)       Past Medical History:   Diagnosis Date   • Diabetes mellitus (CMS/HCC)    • Diarrhea    • Full dentures     INSTRUCTED NO ADHESIVES THE DOS. REPORTS USUALLY ONLY WEARS UPPER PLATE.   • Hearing loss     REPORTS MORE LOSS ON LEFT SIDE BUT THAT HE HAS BILATERAL LOSS. NO USE OF HEARING AIDS.   • Hepatitis     REPORTS AS A CHILD.  UNSURE IF TYPE A OR B.   • History of acute pancreatitis     REPORTS PRIOR TO    • History of gout    • Hypertension    • Seasonal allergies    • Stroke (CMS/HCC)    • Wears glasses     RX GLASSES       Past Surgical History:   Procedure Laterality Date   • COLONOSCOPY N/A 2018    Procedure: COLONOSCOPY;  Surgeon: Ha Sykes MD;  Location: [facility] ENDOSCOPY;  Service: Gastroenterology   • ENDOSCOPY N/A 2018    Procedure: ESOPHAGOGASTRODUODENOSCOPY with cold forcep biopsy;  Surgeon: Ha Sykes MD;  Location: [facility] ENDOSCOPY;  Service: Gastroenterology   • TOOTH EXTRACTION                  IRF OT ASSESSMENT FLOWSHEET (last 12 hours)      IRF OT Evaluation and Treatment     Row Name 21 1239          OT Time and Intention    Document Type  daily treatment  -CJ     Mode of Treatment  occupational therapy  -CJ     Symptoms Noted During/After Treatment  none  -CJ     Row Name 21 123          General Information    Patient/Family/Caregiver Comments/Observations  agreeable to therapy  -CJ     Existing Precautions/Restrictions  fall  -CJ     Limitations/Impairments  safety/cognitive  -CJ     Row Name 21 1239          Cognition/Psychosocial    Follows Commands (Cognition)  verbal cues/prompting  required;repetition of directions required  -     Row Name 07/08/21 1239          Motor Skills    Motor Control/Coordination Interventions  therapeutic exercise/ROM;gross motor coordination activities;fine motor manipulation/dexterity activities BUE ther ex/act, bilat coord ex, GMC/FMC, hand exerciser  -     Row Name 07/08/21 1239          Positioning and Restraints    Post Treatment Position  wheelchair  -     In Wheelchair  sitting;with SLP  -       User Key  (r) = Recorded By, (t) = Taken By, (c) = Cosigned By    Initials Name Effective Dates     Farnaz Camejo, OT 06/16/21 -            Occupational Therapy Education                 Title: PT OT SLP Therapies (In Progress)     Topic: Occupational Therapy (In Progress)     Point: ADL training (In Progress)     Description:   Instruct learner(s) on proper safety adaptation and remediation techniques during self care or transfers.   Instruct in proper use of assistive devices.              Learning Progress Summary           Patient Acceptance, E,D, NR by  at 7/8/2021 1245    Acceptance, E,TB, VU by  at 7/5/2021 1613                   Point: Home exercise program (Done)     Description:   Instruct learner(s) on appropriate technique for monitoring, assisting and/or progressing therapeutic exercises/activities.              Learning Progress Summary           Patient Acceptance, E,TB, VU by  at 7/5/2021 1613                   Point: Precautions (In Progress)     Description:   Instruct learner(s) on prescribed precautions during self-care and functional transfers.              Learning Progress Summary           Patient Acceptance, E,D, NR by  at 7/8/2021 1245    Acceptance, E,TB, VU by  at 7/5/2021 1613                   Point: Body mechanics (Done)     Description:   Instruct learner(s) on proper positioning and spine alignment during self-care, functional mobility activities and/or exercises.              Learning Progress Summary            Patient Acceptance, E,TB, VU by  at 7/5/2021 1613                               User Key     Initials Effective Dates Name Provider Type Discipline     06/16/21 -  Farnaz Camejo, OT Occupational Therapist OT     06/16/21 -  Nuris Mccollum PTA Physical Therapy Assistant PT                    OT Recommendation and Plan                         Time Calculation:     Time Calculation- OT     Row Name 07/08/21 1243             Time Calculation- OT    OT Start Time  1000  -      OT Stop Time  1040  -      OT Time Calculation (min)  40 min  -      Total Timed Code Minutes- OT  40 minute(s)  -        User Key  (r) = Recorded By, (t) = Taken By, (c) = Cosigned By    Initials Name Provider Type     Farnaz Camejo, OT Occupational Therapist        Therapy Charges for Today     Code Description Service Date Service Provider Modifiers Qty    75244066816 HC OT THERAPEUTIC ACT EA 15 MIN 7/8/2021 Farnaz Camejo OT GO 2    94945543608 HC OT THER PROC EA 15 MIN 7/8/2021 Farnaz Camejo OT GO 1                   Farnaz Camjeo OT  7/8/2021

## 2021-07-08 NOTE — PROGRESS NOTES
Rehabilitation Nursing  Inpatient Rehabilitation Plan of Care Note    Plan of Care  Pain    Pain Management (Active)  Current Status (7/1/2021 5:54:00 PM): Potential for pain  Weekly Goal: No pain this week  Discharge Goal: No pain    Safety    Potential for Injury (Active)  Current Status (7/1/2021 5:54:00 PM): At risk for injury  Weekly Goal: No injury this week  Discharge Goal: No injury    Signed by: Shyanne Nelson RN

## 2021-07-08 NOTE — PLAN OF CARE
Problem: Swallowing Impairment  Goal: Optimal Oral Motor and Swallow Ability  Outcome: Ongoing, Progressing     Problem: Communication Impairment  Goal: Effective Communication Skills  Outcome: Ongoing, Progressing   Goal Outcome Evaluation:

## 2021-07-08 NOTE — PROGRESS NOTES
Occupational Therapy: Individual: 0 minutes.    Physical Therapy:    Speech Language Pathology:    Signed by: Farnaz Camejo, Occupational Therapist

## 2021-07-08 NOTE — PROGRESS NOTES
PROGRESS NOTE         Patient Identification:  Name:  Chance Lopez  Age:  65 y.o.  Sex:  male  :  1955  MRN:  [id number]  Visit Number:  [id number]  Primary Care Provider:  Ramos Gurrola MD         LOS: 7 days       ----------------------------------------------------------------------------------------------------------------------  Subjective       Chief Complaints:    Debility      Interval History:    65-year-old gentleman who was recently transferred from [facility] after CVA involving the bihemispheric MCA distribution    Patient was seen while working with physical therapy, states he is feeling great denies any complaints or issues.  CBC, CMP, magnesium ordered for a.m.    Participated with physical and Occupational Therapy on : Performs transfer activities and standby assist to contact-guard and verbal/nonverbal cues; utilizes wheelchair/front wheeled walker for chair to bed, sit to stand, stand to sit transfer; ambulated 320 feet with front wheeled walker and contact-guard with verbal/nonverbal cues; performed therapeutic exercises of the hip, knee, ankle; spent 15 minutes on recumbent stationary bike; participated with coordination; functional endurance; therapeutic exercise.    Review of Systems:    Constitutional: no fever, chills and night sweats.  Generalized fatigue.  Eyes: no eye drainage, itching or redness.  HEENT: no mouth sores, dysphagia or nose bleed.  Respiratory: no for shortness of breath, cough or production of sputum.  Cardiovascular: no chest pain, no palpitations, no orthopnea.  Gastrointestinal: no nausea, vomiting or diarrhea. No abdominal pain, hematemesis or rectal bleeding.  Genitourinary: no dysuria or polyuria.  Hematologic/lymphatic: no lymph node abnormalities, no easy bruising or easy bleeding.  Musculoskeletal: no muscle or joint pain.  Skin: No rash and no itching.  Neurological: mild confusion  Behavioral/Psych: no depression or suicidal  ideation.  Endocrine: no hot flashes.  Immunologic: negative.    ----------------------------------------------------------------------------------------------------------------------      Objective       Current [facility] Meds:  aspirin, 81 mg, Oral, Daily  atorvastatin, 40 mg, Oral, Nightly  carvedilol, 12.5 mg, Oral, BID With Meals  cetirizine, 10 mg, Oral, Daily  clopidogrel, 75 mg, Oral, Daily  enoxaparin, 40 mg, Subcutaneous, Q24H  fluticasone, 2 spray, Each Nare, Daily  insulin aspart, 10 Units, Subcutaneous, TID With Meals  insulin detemir, 15 Units, Subcutaneous, Nightly  multivitamin, 1 tablet, Oral, Daily  OLANZapine, 5 mg, Oral, Nightly  PARoxetine, 20 mg, Oral, Daily  sennosides-docusate, 2 tablet, Oral, BID         ----------------------------------------------------------------------------------------------------------------------    Vital Signs:  Temp:  [97.5 °F (36.4 °C)-97.6 °F (36.4 °C)] 97.5 °F (36.4 °C)  Heart Rate:  [72-82] 82  Resp:  [16-20] 16  BP: (116-130)/(72-77) 117/72  No data found.  SpO2 Percentage    07/07/21 0730 07/07/21 1900 07/08/21 0800   SpO2: 96% 98% 100%     SpO2:  [98 %-100 %] 100 %  on   ;   Device (Oxygen Therapy): room air    Body mass index is 24.39 kg/m².  Wt Readings from Last 3 Encounters:   07/01/21 79.3 kg (174 lb 13.2 oz)   01/04/19 83.6 kg (184 lb 3.2 oz)   12/14/18 83.9 kg (184 lb 15.5 oz)        Intake/Output Summary (Last 24 hours) at 7/8/2021 0949  Last data filed at 7/8/2021 0500  Gross per 24 hour   Intake 780 ml   Output --   Net 780 ml     Diet Soft Texture; Chopped; Thin; Consistent Carbohydrate  ----------------------------------------------------------------------------------------------------------------------      Physical Exam:    General Appearance: Seen while working with physical therapy, working on bilateral lower extremities without any difficulty.    Head: normocephalic, without obvious abnormality and atraumatic    Lungs: clear to auscultation,  respirations regular, respirations even and  respirations unlabored. No accessory muscle use.     Heart:: regular rhythm & normal rate, normal S1, S2, no murmur, no gallop, no  rub and no click.  Chest wall with no abnormalities observed. PMI nondisplaced    Abdomen: normal bowel sounds in all quadrants, no masses, no hepatomegaly,  no splenomegaly, soft non-tender, no guarding and no rebound tenderness    Extremities: no edema, no cyanosis, no redness, no tenderness, no clubbing    Musculoskeletal: joints with no effusion nor erythema nor warmth.  Pedal pulses palpable and equal bilaterally    Skin: no bleeding, bruising or rash and no lesions noted    Lymph Nodes: no palpable adenopathy    Neurologic:    Mental Status: Patient is awake alert and able to follow commands. Mild confusion.   Sensory: Sensory overall seems to be intact in BLE and BUE.   Motor strength: Generalized weakness        ----------------------------------------------------------------------------------------------------------------------            Results from last 7 days   Lab Units 07/02/21  0206   WBC 10*3/mm3 5.12   HEMOGLOBIN g/dL 11.3*   HEMATOCRIT % 34.3*   MCV fL 99.7*   MCHC g/dL 32.9   PLATELETS 10*3/mm3 126*     Results from last 7 days   Lab Units 07/04/21  0117 07/03/21  0111 07/02/21  0206   SODIUM mmol/L 138  --  141   POTASSIUM mmol/L 4.8  --  4.2   MAGNESIUM mg/dL  --  2.1 1.6   CHLORIDE mmol/L 101  --  105   CO2 mmol/L 27.4  --  26.0   BUN mg/dL 20  --  15   CREATININE mg/dL 1.44*  --  1.45*   EGFR IF NONAFRICN AM mL/min/1.73 49*  --  49*   CALCIUM mg/dL 9.4  --  9.2   GLUCOSE mg/dL 304*  --  180*   ALBUMIN g/dL  --   --  3.44*   BILIRUBIN mg/dL  --   --  0.4   ALK PHOS U/L  --   --  90   AST (SGOT) U/L  --   --  27   ALT (SGPT) U/L  --   --  35   Estimated Creatinine Clearance: 57.4 mL/min (A) (by C-G formula based on SCr of 1.44 mg/dL (H)).  No results found for: AMMONIA    Glucose   Date/Time Value Ref Range Status    07/08/2021 0612 137 (H) 70 - 130 mg/dL Final     Comment:     Meter: [id number] : [id number] Bobby Lawrence C   07/07/2021 1951 227 (H) 70 - 130 mg/dL Final     Comment:     Meter: [id number] : [id number] Bobby Lawrence C   07/07/2021 1621 246 (H) 70 - 130 mg/dL Final     Comment:     Meter: [id number] : [id number] Thakkar Neris   07/07/2021 1116 287 (H) 70 - 130 mg/dL Final     Comment:     Meter: [id number] : [id number] Kraig Georgea   07/07/2021 0603 220 (H) 70 - 130 mg/dL Final     Comment:     Meter: [id number] : [id number] [place] Lynne   07/06/2021 2000 79 70 - 130 mg/dL Final     Comment:     Meter: [id number] : [id number] Laron Crook   07/06/2021 1725 163 (H) 70 - 130 mg/dL Final     Comment:     Meter: [id number] : [id number] GARCIA NAN   07/06/2021 1613 91 70 - 130 mg/dL Final     Comment:     Meter: [id number] : [id number] Kraig Fernandes     No results found for: HGBA1C  No results found for: TSH, FREET4    No results found for: BLOODCX  No results found for: URINECX  No results found for: WOUNDCX  No results found for: STOOLCX  No results found for: RESPCX  Pain Management Panel    There is no flowsheet data to display.           ----------------------------------------------------------------------------------------------------------------------  Imaging Results (Last 24 Hours)       ** No results found for the last 24 hours. **            ----------------------------------------------------------------------------------------------------------------------    Assessment/Plan       Assessment/Plan     ASSESSMENT:    Status post CVA with bihemispheric MCA infarctions  Diabetes mellitus  Acute kidney injury  Hypertension  Encephalopathy      PLAN:    Patient was seen while working with physical therapy, states he is feeling great denies any complaints or issues.  CBC, CMP, magnesium ordered for a.m.    Participated with physical and Occupational Therapy on  7/7: Performs transfer activities and standby assist to contact-guard and verbal/nonverbal cues; utilizes wheelchair/front wheeled walker for chair to bed, sit to stand, stand to sit transfer; ambulated 320 feet with front wheeled walker and contact-guard with verbal/nonverbal cues; performed therapeutic exercises of the hip, knee, ankle; spent 15 minutes on recumbent stationary bike; participated with coordination; functional endurance; therapeutic exercise.    Diabetes mellitus-- 15 units subcu nightly    Acute kidney injury--creatinine stable      Hypertension continue Coreg 12.5 mg twice daily     Recent issues with acute encephalopathy-- Paxil and Zyprexa        Code Status:   Code Status and Medical Interventions:   Ordered at: 07/01/21 1835     Level Of Support Discussed With:    Patient     Code Status:    CPR     Medical Interventions (Level of Support Prior to Arrest):    Full     Scribed for Chelsie Mccauley MD by SANTA Ribeiro. 7/8/2021  09:46 EDT       SANTA Ribeiro  07/08/21  09:46 EDT    Physician Attestation:    The documentation recorded by the scribe accurately reflects the service I personally performed and the decisions made by me.    Chelsie Mccauley MD  Infectious Diseases  07/08/21  09:46 EDT

## 2021-07-09 LAB
ALBUMIN SERPL-MCNC: 3.58 G/DL (ref 3.5–5.2)
ALBUMIN/GLOB SERPL: 1.2 G/DL
ALP SERPL-CCNC: 100 U/L (ref 39–117)
ALT SERPL W P-5'-P-CCNC: 34 U/L (ref 1–41)
ANION GAP SERPL CALCULATED.3IONS-SCNC: 12.5 MMOL/L (ref 5–15)
AST SERPL-CCNC: 30 U/L (ref 1–40)
BASOPHILS # BLD AUTO: 0.06 10*3/MM3 (ref 0–0.2)
BASOPHILS NFR BLD AUTO: 1.4 % (ref 0–1.5)
BILIRUB SERPL-MCNC: 0.3 MG/DL (ref 0–1.2)
BUN SERPL-MCNC: 28 MG/DL (ref 8–23)
BUN/CREAT SERPL: 19.6 (ref 7–25)
CALCIUM SPEC-SCNC: 9.5 MG/DL (ref 8.6–10.5)
CHLORIDE SERPL-SCNC: 103 MMOL/L (ref 98–107)
CO2 SERPL-SCNC: 23.5 MMOL/L (ref 22–29)
CREAT SERPL-MCNC: 1.43 MG/DL (ref 0.76–1.27)
DEPRECATED RDW RBC AUTO: 46.5 FL (ref 37–54)
EOSINOPHIL # BLD AUTO: 0.27 10*3/MM3 (ref 0–0.4)
EOSINOPHIL NFR BLD AUTO: 6.3 % (ref 0.3–6.2)
ERYTHROCYTE [DISTWIDTH] IN BLOOD BY AUTOMATED COUNT: 12.8 % (ref 12.3–15.4)
GFR SERPL CREATININE-BSD FRML MDRD: 50 ML/MIN/1.73
GLOBULIN UR ELPH-MCNC: 3 GM/DL
GLUCOSE BLDC GLUCOMTR-MCNC: 196 MG/DL (ref 70–130)
GLUCOSE BLDC GLUCOMTR-MCNC: 247 MG/DL (ref 70–130)
GLUCOSE BLDC GLUCOMTR-MCNC: 269 MG/DL (ref 70–130)
GLUCOSE BLDC GLUCOMTR-MCNC: 95 MG/DL (ref 70–130)
GLUCOSE SERPL-MCNC: 100 MG/DL (ref 65–99)
HCT VFR BLD AUTO: 34.7 % (ref 37.5–51)
HGB BLD-MCNC: 11.8 G/DL (ref 13–17.7)
IMM GRANULOCYTES # BLD AUTO: 0.02 10*3/MM3 (ref 0–0.05)
IMM GRANULOCYTES NFR BLD AUTO: 0.5 % (ref 0–0.5)
LYMPHOCYTES # BLD AUTO: 1.5 10*3/MM3 (ref 0.7–3.1)
LYMPHOCYTES NFR BLD AUTO: 34.9 % (ref 19.6–45.3)
MAGNESIUM SERPL-MCNC: 1.9 MG/DL (ref 1.6–2.4)
MCH RBC QN AUTO: 33.2 PG (ref 26.6–33)
MCHC RBC AUTO-ENTMCNC: 34 G/DL (ref 31.5–35.7)
MCV RBC AUTO: 97.7 FL (ref 79–97)
MONOCYTES # BLD AUTO: 0.4 10*3/MM3 (ref 0.1–0.9)
MONOCYTES NFR BLD AUTO: 9.3 % (ref 5–12)
NEUTROPHILS NFR BLD AUTO: 2.05 10*3/MM3 (ref 1.7–7)
NEUTROPHILS NFR BLD AUTO: 47.6 % (ref 42.7–76)
NRBC BLD AUTO-RTO: 0 /100 WBC (ref 0–0.2)
PLATELET # BLD AUTO: 221 10*3/MM3 (ref 140–450)
PMV BLD AUTO: 11.2 FL (ref 6–12)
POTASSIUM SERPL-SCNC: 4.6 MMOL/L (ref 3.5–5.2)
PROT SERPL-MCNC: 6.6 G/DL (ref 6–8.5)
RBC # BLD AUTO: 3.55 10*6/MM3 (ref 4.14–5.8)
SODIUM SERPL-SCNC: 139 MMOL/L (ref 136–145)
WBC # BLD AUTO: 4.3 10*3/MM3 (ref 3.4–10.8)

## 2021-07-09 PROCEDURE — 92507 TX SP LANG VOICE COMM INDIV: CPT

## 2021-07-09 PROCEDURE — 83735 ASSAY OF MAGNESIUM: CPT | Performed by: INTERNAL MEDICINE

## 2021-07-09 PROCEDURE — 97530 THERAPEUTIC ACTIVITIES: CPT

## 2021-07-09 PROCEDURE — 97110 THERAPEUTIC EXERCISES: CPT

## 2021-07-09 PROCEDURE — 63710000001 INSULIN DETEMIR PER 5 UNITS: Performed by: INTERNAL MEDICINE

## 2021-07-09 PROCEDURE — 63710000001 INSULIN ASPART PER 5 UNITS: Performed by: FAMILY MEDICINE

## 2021-07-09 PROCEDURE — 80053 COMPREHEN METABOLIC PANEL: CPT | Performed by: INTERNAL MEDICINE

## 2021-07-09 PROCEDURE — 85025 COMPLETE CBC W/AUTO DIFF WBC: CPT | Performed by: INTERNAL MEDICINE

## 2021-07-09 PROCEDURE — 97530 THERAPEUTIC ACTIVITIES: CPT | Performed by: OCCUPATIONAL THERAPIST

## 2021-07-09 PROCEDURE — 97116 GAIT TRAINING THERAPY: CPT

## 2021-07-09 PROCEDURE — 97112 NEUROMUSCULAR REEDUCATION: CPT | Performed by: OCCUPATIONAL THERAPIST

## 2021-07-09 PROCEDURE — 82962 GLUCOSE BLOOD TEST: CPT

## 2021-07-09 PROCEDURE — 25010000002 ENOXAPARIN PER 10 MG: Performed by: FAMILY MEDICINE

## 2021-07-09 PROCEDURE — 97535 SELF CARE MNGMENT TRAINING: CPT | Performed by: OCCUPATIONAL THERAPIST

## 2021-07-09 PROCEDURE — 25010000003 MAGNESIUM SULFATE 4 GM/100ML SOLUTION: Performed by: INTERNAL MEDICINE

## 2021-07-09 RX ORDER — MAGNESIUM SULFATE HEPTAHYDRATE 40 MG/ML
4 INJECTION, SOLUTION INTRAVENOUS ONCE
Status: DISCONTINUED | OUTPATIENT
Start: 2021-07-09 | End: 2021-07-09

## 2021-07-09 RX ORDER — MAGNESIUM SULFATE HEPTAHYDRATE 40 MG/ML
4 INJECTION, SOLUTION INTRAVENOUS ONCE
Status: COMPLETED | OUTPATIENT
Start: 2021-07-09 | End: 2021-07-09

## 2021-07-09 RX ADMIN — Medication 1 TABLET: at 09:12

## 2021-07-09 RX ADMIN — DOCUSATE SODIUM 50 MG AND SENNOSIDES 8.6 MG 2 TABLET: 8.6; 5 TABLET, FILM COATED ORAL at 20:36

## 2021-07-09 RX ADMIN — ENOXAPARIN SODIUM 40 MG: 40 INJECTION SUBCUTANEOUS at 20:35

## 2021-07-09 RX ADMIN — CARVEDILOL 12.5 MG: 6.25 TABLET, FILM COATED ORAL at 17:03

## 2021-07-09 RX ADMIN — INSULIN DETEMIR 15 UNITS: 100 INJECTION, SOLUTION SUBCUTANEOUS at 21:06

## 2021-07-09 RX ADMIN — CLOPIDOGREL 75 MG: 75 TABLET, FILM COATED ORAL at 09:12

## 2021-07-09 RX ADMIN — CETIRIZINE HYDROCHLORIDE 10 MG: 10 TABLET, FILM COATED ORAL at 09:12

## 2021-07-09 RX ADMIN — PAROXETINE HYDROCHLORIDE 20 MG: 20 TABLET, FILM COATED ORAL at 09:12

## 2021-07-09 RX ADMIN — INSULIN ASPART 10 UNITS: 100 INJECTION, SOLUTION INTRAVENOUS; SUBCUTANEOUS at 09:14

## 2021-07-09 RX ADMIN — ATORVASTATIN CALCIUM 40 MG: 40 TABLET, FILM COATED ORAL at 20:35

## 2021-07-09 RX ADMIN — INSULIN ASPART 10 UNITS: 100 INJECTION, SOLUTION INTRAVENOUS; SUBCUTANEOUS at 17:03

## 2021-07-09 RX ADMIN — OLANZAPINE 5 MG: 5 TABLET, FILM COATED ORAL at 20:35

## 2021-07-09 RX ADMIN — INSULIN ASPART 10 UNITS: 100 INJECTION, SOLUTION INTRAVENOUS; SUBCUTANEOUS at 11:46

## 2021-07-09 RX ADMIN — MAGNESIUM SULFATE HEPTAHYDRATE 4 G: 40 INJECTION, SOLUTION INTRAVENOUS at 15:38

## 2021-07-09 RX ADMIN — DOCUSATE SODIUM 50 MG AND SENNOSIDES 8.6 MG 2 TABLET: 8.6; 5 TABLET, FILM COATED ORAL at 09:12

## 2021-07-09 RX ADMIN — CARVEDILOL 12.5 MG: 6.25 TABLET, FILM COATED ORAL at 09:12

## 2021-07-09 RX ADMIN — ASPIRIN 81 MG: 81 TABLET, COATED ORAL at 09:12

## 2021-07-09 RX ADMIN — FLUTICASONE PROPIONATE 2 SPRAY: 50 SPRAY, METERED NASAL at 09:14

## 2021-07-09 NOTE — THERAPY TREATMENT NOTE
Inpatient Rehabilitation - Speech Language Pathology Treatment Note  [facility]     Patient Name: Chance Lopez  : 1955  MRN: [id number]  Today's Date: 2021             Admit Date: 2021     Chance Lopez was seen this pm in patient room for continued speech/language/cognitive therapy.  He is pleasant and interactive to participate in all tx tasks.        Long Term Goal:  Pt will demonstrate wfl speech language skills in all activities and w/ all communication partners to complete adls.      Short Term Goals:  1. Pt will recall various pictures/items to increase STM in 3/5 opp over 3 consecutive sessions w/ min cues.   * Not addressed on this date.      2. Pt will recall personal LTM information in 3/5 opp over 3 consecutive sessions w/ min cues.   *Not addressed on this date.       3. Pt will complete following multi-step directives tasks related to adls in 3/5 opp over 3 consecutive sessions w/ min cues.   * 3 step directives related to adls in 2/5 opp w/ min-mod cues.      4. Pt will perform sequencing tasks related to adls in 3/5 opp over 3 consecutive sessions w/ min cues.   * 2 steps of familiar task. (change a tire)      5. Pt will perform convergent naming tasks w/ min cues in 3/5 opp over 3 consecutive sessions.   * Not specifically addressed on this date.     6. Pt will perform divergent naming tasks w/ min cues in approx 1 min in 3/5 opp over 3 consecutive sessions.   *  Narrow category of grocery store items: 4 items named independently.  Narrow category of snack foods: 2 items independently, 2 items named w/ min-mod cues, 1 item named w/ max cues.     7. Pt will perform graphic tasks related to adls w/ min cues in 3/5 opp over 3 consecutive sessions.   * Not directly addressed     8. Pt will demonstrate topic maintenance across session w/ min cues in 3/5 opp over 3 consecutive sessions.    * Mr. Lopez maintained topics of hunting, previous activities throughout the day.     9. Pt will label  items/pictures presented in 3/5 opp to decrease anomia w/ min cues over 3 consecutive sessions.   * Not addressed on this date.     10. Pt will respond to simple/moderate OE questions pertaining to adls w/ moderate cues 3/5 opp over 3 consecutive sessions.   * responds to simple OE questions pertaining to adls w/ mod cues in 2/5 opp.       ----------------------------------------------------------------------------------------------------      DYSPHAGIA THERAPY PLAN OF CARE:     Chance Lopez was seen this am in speech therapy office for formal therapy.      Long Term Goal:  Pt will accept least restrictive diet tolerance w/o overt s/s aspiration.      Short Term Goals:     1. Pt will perform OROM/SHANA exercises x3 sets x10 reps w/ min cues.  * GOAL MET.     2. Pt will perform compensatory techniques during meals w/ min cues.  * GOAL MET           Thank you-  Claribel Lovell M.S., CFY-SLP     Visit Dx:  No diagnosis found.  Patient Active Problem List   Diagnosis   • Colon cancer screening   • Gastritis   • CVA (cerebral vascular accident) (CMS/HCC)     Past Medical History:   Diagnosis Date   • Diabetes mellitus (CMS/HCC)    • Diarrhea    • Full dentures     INSTRUCTED NO ADHESIVES THE DOS. REPORTS USUALLY ONLY WEARS UPPER PLATE.   • Hearing loss     REPORTS MORE LOSS ON LEFT SIDE BUT THAT HE HAS BILATERAL LOSS. NO USE OF HEARING AIDS.   • Hepatitis     REPORTS AS A CHILD.  UNSURE IF TYPE A OR B.   • History of acute pancreatitis     REPORTS PRIOR TO 2000   • History of gout    • Hypertension    • Seasonal allergies    • Stroke (CMS/HCC)    • Wears glasses     RX GLASSES     Past Surgical History:   Procedure Laterality Date   • COLONOSCOPY N/A 11/26/2018    Procedure: COLONOSCOPY;  Surgeon: Ha Sykes MD;  Location: [facility] ENDOSCOPY;  Service: Gastroenterology   • ENDOSCOPY N/A 11/26/2018    Procedure: ESOPHAGOGASTRODUODENOSCOPY with cold forcep biopsy;  Surgeon: Ha Sykes MD;  Location: [facility] ENDOSCOPY;   Service: Gastroenterology   • TOOTH EXTRACTION        EDUCATION  The patient has been educated in the following areas:     Cognitive Impairment Communication Impairment.    Impression: Mr. Lopez continues to make progress towards his goals.     SLP Recommendation and Plan   Continue current POC to allow for maximal opportunities for improvement.     Thank you   Claribel Lovell M.S., CFY-SLP                   Time Calculation:     Time Calculation- SLP     Row Name 07/09/21 1529 07/09/21 1209          Time Calculation- SLP    SLP Start Time  1300  -  1045  -     SLP Stop Time  1330  -JR  1130  -     SLP Time Calculation (min)  30 min  -JR  45 min  -     SLP - Next Appointment  07/12/21  -  07/12/21  -       User Key  (r) = Recorded By, (t) = Taken By, (c) = Cosigned By    Initials Name Provider Type    Claribel Correa MS, CFY-SLP Speech and Language Pathologist          Therapy Charges for Today     Code Description Service Date Service Provider Modifiers Qty    99870740406 HC ST TREATMENT SPEECH 3 7/8/2021 Claribel Lovell MS, CFY-SLP GN 1    99001559689 HC ST TREATMENT SPEECH 3 7/8/2021 Claribel Lovell MS, CFY-SLP GN 1    [id number] HC ST TREATMENT SPEECH 3 7/9/2021 Claribel Lovell MS, CFY-SLP GN 1    [id number] HC ST TREATMENT SPEECH 2 7/9/2021 Claribel Lovell MS, CFY-SLP GN 1                     Claribel Lovell MS, KITTY-SLP  7/9/2021

## 2021-07-09 NOTE — SIGNIFICANT NOTE
07/09/21 0906   Plan   Plan Received call from Therese with [facility].  Pt is pending PA from insurance.  [facility] to follow-up with SS when insurance makes a decision.

## 2021-07-09 NOTE — PLAN OF CARE
Problem: Swallowing Impairment  Goal: Optimal Oral Motor and Swallow Ability  7/9/2021 1534 by Claribel Lovell MS, CFY-SLP  Outcome: Met  7/9/2021 1209 by Clarible Lovell MS, CFY-SLP  Outcome: Ongoing, Progressing     Problem: Communication Impairment  Goal: Effective Communication Skills  7/9/2021 1534 by Claribel Lovell MS, CFY-SLP  Outcome: Ongoing, Progressing  7/9/2021 1209 by Claribel Lovell MS, CFY-SLP  Outcome: Ongoing, Progressing   Goal Outcome Evaluation:

## 2021-07-09 NOTE — PROGRESS NOTES
Occupational Therapy: Individual: 90 minutes.    Physical Therapy:    Speech Language Pathology:    Signed by: Gabi Marinelli, Occupational Therapist

## 2021-07-09 NOTE — THERAPY TREATMENT NOTE
Inpatient Rehabilitation - Occupational Therapy Treatment Note     [place]     Patient Name: Chance Lopez  : 1955  MRN: [id number]    Today's Date: 2021                 Admit Date: 2021       No diagnosis found.    Patient Active Problem List   Diagnosis   • Colon cancer screening   • Gastritis   • CVA (cerebral vascular accident) (CMS/HCC)       Past Medical History:   Diagnosis Date   • Diabetes mellitus (CMS/HCC)    • Diarrhea    • Full dentures     INSTRUCTED NO ADHESIVES THE DOS. REPORTS USUALLY ONLY WEARS UPPER PLATE.   • Hearing loss     REPORTS MORE LOSS ON LEFT SIDE BUT THAT HE HAS BILATERAL LOSS. NO USE OF HEARING AIDS.   • Hepatitis     REPORTS AS A CHILD.  UNSURE IF TYPE A OR B.   • History of acute pancreatitis     REPORTS PRIOR TO    • History of gout    • Hypertension    • Seasonal allergies    • Stroke (CMS/HCC)    • Wears glasses     RX GLASSES       Past Surgical History:   Procedure Laterality Date   • COLONOSCOPY N/A 2018    Procedure: COLONOSCOPY;  Surgeon: Ha Sykes MD;  Location: [facility] ENDOSCOPY;  Service: Gastroenterology   • ENDOSCOPY N/A 2018    Procedure: ESOPHAGOGASTRODUODENOSCOPY with cold forcep biopsy;  Surgeon: Ha Sykes MD;  Location: [facility] ENDOSCOPY;  Service: Gastroenterology   • TOOTH EXTRACTION                  IRF OT ASSESSMENT FLOWSHEET (last 12 hours)      IRF OT Evaluation and Treatment     Row Name 21 1454          OT Time and Intention    Document Type  daily treatment  -     Mode of Treatment  individual therapy;occupational therapy  -     Patient Effort  good  -     Row Name 21 1454          General Information    Patient/Family/Caregiver Comments/Observations  patient agreeable to therapy  -     Existing Precautions/Restrictions  fall confusion  -     Row Name 21 1454          Transfers    Bed-Chair [place] (Transfers)  contact guard;verbal cues  -     [place] Level (Toilet Transfer)   contact guard;verbal cues  -     Row Name 07/09/21 1454          Toilet Transfer    Type (Toilet Transfer)  stand pivot/stand step  -[facility] Name 07/09/21 1454          Motor Skills    Motor Skills  coordination;functional endurance;neuro-muscular function;therapeutic exercise  -     Therapeutic Exercise  -- BUE GMC,FMC, strenghtening  -     Row Name 07/09/21 1454          Upper Body Dressing    [place] Level (Upper Body Dressing)  set up assistance;verbal cues  -[facility] Name 07/09/21 1454          Grooming    [place] Level (Grooming)  set up;verbal cues  -[facility] Name 07/09/21 1454          Toileting    [place] Level (Toileting)  contact guard assist;verbal cues  [facility]       User Key  (r) = Recorded By, (t) = Taken By, (c) = Cosigned By    Initials Name Effective Dates     Ambreen Marinelli, OT 06/16/21 -            Occupational Therapy Education                 Title: PT OT SLP Therapies (In Progress)     Topic: Occupational Therapy (In Progress)     Point: ADL training (In Progress)     Description:   Instruct learner(s) on proper safety adaptation and remediation techniques during self care or transfers.   Instruct in proper use of assistive devices.              Learning Progress Summary           Patient Acceptance, E,D, NR by  at 7/8/2021 1245    Acceptance, E,TB, VU by RF at 7/5/2021 1613                   Point: Home exercise program (Done)     Description:   Instruct learner(s) on appropriate technique for monitoring, assisting and/or progressing therapeutic exercises/activities.              Learning Progress Summary           Patient Acceptance, E,TB, VU by RF at 7/5/2021 1613                   Point: Precautions (In Progress)     Description:   Instruct learner(s) on prescribed precautions during self-care and functional transfers.              Learning Progress Summary           Patient Acceptance, E,D, NR by BRITTANI at 7/8/2021 1245    Acceptance, E,TB, VU by RF at  7/5/2021 1613                   Point: Body mechanics (Done)     Description:   Instruct learner(s) on proper positioning and spine alignment during self-care, functional mobility activities and/or exercises.              Learning Progress Summary           Patient Acceptance, E,TB, VU by  at 7/5/2021 1613                               User Key     Initials Effective Dates Name Provider Type Discipline     06/16/21 -  Farnaz Camejo, OT Occupational Therapist OT     06/16/21 -  Nuris Mccollum, PTA Physical Therapy Assistant PT                    OT Recommendation and Plan    Planned Therapy Interventions (OT): activity tolerance training, adaptive equipment training, BADL retraining, neuromuscular control/coordination retraining, patient/caregiver education/training, ROM/therapeutic exercise, strengthening exercise, transfer/mobility retraining                    Time Calculation:     Time Calculation- OT     Row Name 07/09/21 1456             Time Calculation- OT    OT Start Time  0745  -      OT Stop Time  0915  -      OT Time Calculation (min)  90 min  -        User Key  (r) = Recorded By, (t) = Taken By, (c) = Cosigned By    Initials Name Provider Type     Ambreen Marinelli OT Occupational Therapist        Therapy Charges for Today     Code Description Service Date Service Provider Modifiers Qty    03608626961 HC OT THER PROC EA 15 MIN 7/8/2021 Ambreen Marinelli OT GO 2    59948909849 HC OT THERAPEUTIC ACT EA 15 MIN 7/8/2021 Ambreen Marinelli OT GO 1    70526489767 HC OT THERAPEUTIC ACT EA 15 MIN 7/9/2021 Ambreen Marinelli OT GO 2    25718767083 HC OT SELF CARE/MGMT/TRAIN EA 15 MIN 7/9/2021 Ambreen Marinelli OT GO 2    42881522779 HC OT NEUROMUSC RE EDUCATION EA 15 MIN 7/9/2021 Ambreen Marinelli OT GO 2                   Ambreen Marinelli OT  7/9/2021

## 2021-07-09 NOTE — THERAPY TREATMENT NOTE
Inpatient Rehabilitation - Physical Therapy Treatment Note        [place]     Patient Name: Chance Lopez  : 1955  MRN: [id number]    Today's Date: 2021                    Admit Date: 2021      Visit Dx: No diagnosis found.    Patient Active Problem List   Diagnosis   • Colon cancer screening   • Gastritis   • CVA (cerebral vascular accident) (CMS/HCC)       Past Medical History:   Diagnosis Date   • Diabetes mellitus (CMS/HCC)    • Diarrhea    • Full dentures     INSTRUCTED NO ADHESIVES THE DOS. REPORTS USUALLY ONLY WEARS UPPER PLATE.   • Hearing loss     REPORTS MORE LOSS ON LEFT SIDE BUT THAT HE HAS BILATERAL LOSS. NO USE OF HEARING AIDS.   • Hepatitis     REPORTS AS A CHILD.  UNSURE IF TYPE A OR B.   • History of acute pancreatitis     REPORTS PRIOR TO    • History of gout    • Hypertension    • Seasonal allergies    • Stroke (CMS/HCC)    • Wears glasses     RX GLASSES       Past Surgical History:   Procedure Laterality Date   • COLONOSCOPY N/A 2018    Procedure: COLONOSCOPY;  Surgeon: Ha Sykes MD;  Location: [facility] ENDOSCOPY;  Service: Gastroenterology   • ENDOSCOPY N/A 2018    Procedure: ESOPHAGOGASTRODUODENOSCOPY with cold forcep biopsy;  Surgeon: Ha Sykes MD;  Location: [facility] ENDOSCOPY;  Service: Gastroenterology   • TOOTH EXTRACTION            PT ASSESSMENT (last 12 hours)      IRF PT Evaluation and Treatment     Row Name 21          PT Time and Intention    Document Type  daily treatment  -     Mode of Treatment  individual therapy;physical therapy  -     Patient/Family/Caregiver Comments/Observations  No c/o  -     Row Name 21 141          Transfers    Sit-Stand [place] (Transfers)  standby assist;contact guard  -     Stand-Sit [place] (Transfers)  verbal cues;standby assist;contact guard  -     Row Name 21 141          Sit-Stand Transfer    Assistive Device (Sit-Stand Transfers)  walker, front-wheeled  CAITLYN Knutson  Name 07/09/21 1411          Stand-Sit Transfer    Assistive Device (Stand-Sit Transfers)  walker, front-wheeled  -     Row Name 07/09/21 1411          Gait/Stairs (Locomotion)    [place] Level (Gait)  contact guard  -     Assistive Device (Gait)  walker, front-wheeled  -     Distance in Feet (Gait)  500+  -KH     Comment (Gait/Stairs)  Pt had rest breaks given through tx  -     Row Name 07/09/21 1411          Balance    Comment, Balance  bean bag toss with foam activity  -     Row Name 07/09/21 1411          Motor Skills    Therapeutic Exercise  other (see comments) Please see flow sheet for detailed TE  -     Row Name 07/09/21 1411          Positioning and Restraints    Pre-Treatment Position  sitting in chair/recliner  -     Post Treatment Position  wheelchair  -     In Bed  sitting;call light within reach  -     Row Name 07/09/21 1411          Daily Progress Summary (PT)    Daily Progress Summary (PT)  Pt tolerated session well with some confusion noted during exercises. Pt demonstrates good endurance during gait and activity. Continue therapy  -       User Key  (r) = Recorded By, (t) = Taken By, (c) = Cosigned By    Initials Name Provider Type    Anni Simental, PT Physical Therapist           Physical Therapy Education                 Title: PT OT SLP Therapies (In Progress)     Topic: Physical Therapy (Done)     Point: Mobility training (Done)     Learning Progress Summary           Patient Acceptance, E,D, VU,NR by RG at 7/8/2021 1442    Acceptance, E,D, VU,NR by RG at 7/7/2021 1519    Acceptance, E,D, VU,NR by RG at 7/7/2021 1517    Acceptance, E,TB, VU by RF at 7/6/2021 1545    Acceptance, E,TB, VU by RF at 7/5/2021 1613    Acceptance, E, VU,NR by LB at 7/3/2021 1357    Acceptance, E, VU,NR by LB at 7/2/2021 1524                   Point: Home exercise program (Done)     Learning Progress Summary           Patient Acceptance, E,D, VU,NR by RG at 7/8/2021 1442    Acceptance, E,D,  VU,NR by RG at 7/7/2021 1519    Acceptance, E,D, VU,NR by RG at 7/7/2021 1517    Acceptance, E,TB, VU by RF at 7/6/2021 1545    Acceptance, E,TB, VU by RF at 7/5/2021 1613    Acceptance, E, VU,NR by LB at 7/3/2021 1357    Acceptance, E, VU,NR by LB at 7/2/2021 1524                   Point: Body mechanics (Done)     Learning Progress Summary           Patient Acceptance, E,D, VU,NR by RG at 7/8/2021 1442    Acceptance, E,D, VU,NR by RG at 7/7/2021 1519    Acceptance, E,D, VU,NR by RG at 7/7/2021 1517    Acceptance, E,TB, VU by RF at 7/6/2021 1545    Acceptance, E,TB, VU by RF at 7/5/2021 1613    Acceptance, E, VU,NR by LB at 7/3/2021 1357    Acceptance, E, VU,NR by LB at 7/2/2021 1524                   Point: Precautions (Done)     Learning Progress Summary           Patient Acceptance, E,D, VU,NR by RG at 7/8/2021 1442    Acceptance, E,D, VU,NR by RG at 7/7/2021 1519    Acceptance, E,D, VU,NR by RG at 7/7/2021 1517    Acceptance, E,TB, VU by RF at 7/6/2021 1545    Acceptance, E,TB, VU by RF at 7/5/2021 1613    Acceptance, E, VU,NR by LB at 7/3/2021 1357    Acceptance, E, VU,NR by LB at 7/2/2021 1524                               User Key     Initials Effective Dates Name Provider Type Discipline    LB 06/16/21 -  Linda Rodríguez, PT Physical Therapist PT     06/16/21 -  Nuris Mccollum, PTA Physical Therapy Assistant PT    RG 06/16/21 -  Robin Johnson PTA Physical Therapy Assistant PT                PT Recommendation and Plan          Daily Progress Summary (PT)  Daily Progress Summary (PT): Pt tolerated session well with some confusion noted during exercises. Pt demonstrates good endurance during gait and activity. Continue therapy               Time Calculation:     PT Charges     Row Name 07/09/21 1450 07/09/21 1456          Time Calculation    Start Time  1353  -KH  0925  -KH     Stop Time  1403  -KH  1045  -KH     Time Calculation (min)  10 min  -KH  80 min  -KH     PT Received On  07/09/21  -KH   07/09/21  -MARQUES       User Key  (r) = Recorded By, (t) = Taken By, (c) = Cosigned By    Initials Name Provider Type    Anni Simental, PT Physical Therapist          Therapy Charges for Today     Code Description Service Date Service Provider Modifiers Qty    25520247283  GAIT TRAINING EA 15 MIN 7/9/2021 Anni Skinner, PT GP 2    [id number]  PT THER PROC EA 15 MIN 7/9/2021 Anni Skinner, PT GP 1    [id number]  PT THERAPEUTIC ACT EA 15 MIN 7/9/2021 Anni Skinner, PT GP 3                   Anni Skinner, PT  7/9/2021

## 2021-07-09 NOTE — PROGRESS NOTES
Occupational Therapy:    Physical Therapy:    Speech Language Pathology: Individual: 75 minutes.    Signed by: PABLO Leon

## 2021-07-09 NOTE — PROGRESS NOTES
Occupational Therapy:    Physical Therapy: Individual: 90 minutes.    Speech Language Pathology:    Signed by: Anni Skinner PT

## 2021-07-09 NOTE — THERAPY TREATMENT NOTE
Inpatient Rehabilitation - Speech Language Pathology Treatment Note  [facility]     Patient Name: Chance Lopez  : 1955  MRN: [id number]  Today's Date: 2021             Admit Date: 2021     Chance Lopez was seen this am in speech therapy office for continued speech/language/cognitive therapy.  He is pleasant and interactive to participate in all tx tasks.        Long Term Goal:  Pt will demonstrate wfl speech language skills in all activities and w/ all communication partners to complete adls.      Short Term Goals:  1. Pt will recall various pictures/items to increase STM in 3/5 opp over 3 consecutive sessions w/ min cues.   * 5 stimuli objects presented w/ requests to recall. He is able to recall 2 w/o cues independently, 1 w/ min-mod cues, 2 w/ mod-max cues.  Second time 3 independently, min cues for 1, min-mod for 1.       2. Pt will recall personal LTM information in 3/5 opp over 3 consecutive sessions w/ min cues.   *Mr. Lopez is able to verbally provide his name and  correctly.  He independently provides grandchildren's names.      3. Pt will complete following multi-step directives tasks related to adls in 3/5 opp over 3 consecutive sessions w/ min cues.   * No specifically targeted on this date.     4. Pt will perform sequencing tasks related to adls in 3/5 opp over 3 consecutive sessions w/ min cues.   * Not addressed on this date.      5. Pt will perform convergent naming tasks w/ min cues in 3/5 opp over 3 consecutive sessions.   * 2/3 independently. 1/3 w/ max cues.      6. Pt will perform divergent naming tasks w/ min cues in approx 1 min in 3/5 opp over 3 consecutive sessions.   *  2 independently, 2 min cues, 1 max cues over 4 min.      7. Pt will perform graphic tasks related to adls w/ min cues in 3/5 opp over 3 consecutive sessions.   * Pt able to write name independently.      8. Pt will demonstrate topic maintenance across session w/ min cues in 3/5 opp over 3 consecutive  sessions.    * Mr. Lopez maintained topics of family, fishing, hunting, daily activities w/o cues and in appropriate contexts for approximatley 5 minutes each topic. He independently transitions appropriately to new topic.     9. Pt will label items/pictures presented in 3/5 opp to decrease anomia w/ min cues over 3 consecutive sessions.   * Confrontation naming of common nouns in pictures at 100% w/o cues.     10. Pt will respond to simple/moderate OE questions pertaining to adls w/ moderate cues 3/5 opp over 3 consecutive sessions.   * responds to simple OE questions pertaining to adls w/ mod cues in 2/5 opp.       ----------------------------------------------------------------------------------------------------      DYSPHAGIA THERAPY PLAN OF CARE:     Chance Lopez was seen this am in speech therapy office for formal therapy.      Long Term Goal:  Pt will accept least restrictive diet tolerance w/o overt s/s aspiration.      Short Term Goals:     1. Pt will perform OROM/SHANA exercises x3 sets x10 reps w/ min cues.  * Not addressed on this date.     2. Pt will perform compensatory techniques during meals w/ min cues.  * Discussed w/ pt regarding techniques during meals. Assisted w/ tray set up.            Thank you-  Claribel Lovell M.S., CFY-SLP     Visit Dx:  No diagnosis found.  Patient Active Problem List   Diagnosis   • Colon cancer screening   • Gastritis   • CVA (cerebral vascular accident) (CMS/HCC)     Past Medical History:   Diagnosis Date   • Diabetes mellitus (CMS/HCC)    • Diarrhea    • Full dentures     INSTRUCTED NO ADHESIVES THE DOS. REPORTS USUALLY ONLY WEARS UPPER PLATE.   • Hearing loss     REPORTS MORE LOSS ON LEFT SIDE BUT THAT HE HAS BILATERAL LOSS. NO USE OF HEARING AIDS.   • Hepatitis     REPORTS AS A CHILD.  UNSURE IF TYPE A OR B.   • History of acute pancreatitis     REPORTS PRIOR TO 2000   • History of gout    • Hypertension    • Seasonal allergies    • Stroke (CMS/HCC)    • Wears glasses      RX GLASSES     Past Surgical History:   Procedure Laterality Date   • COLONOSCOPY N/A 11/26/2018    Procedure: COLONOSCOPY;  Surgeon: Ha Sykes MD;  Location: [facility] ENDOSCOPY;  Service: Gastroenterology   • ENDOSCOPY N/A 11/26/2018    Procedure: ESOPHAGOGASTRODUODENOSCOPY with cold forcep biopsy;  Surgeon: Ha Sykes MD;  Location: [facility] ENDOSCOPY;  Service: Gastroenterology   • TOOTH EXTRACTION        EDUCATION  The patient has been educated in the following areas:     Cognitive Impairment Communication Impairment.    Impression: Mr. Lopez continues to make progress towards his goals.     SLP Recommendation and Plan   Continue current POC to allow for maximal opportunities for improvement.     Thank you   Claribel Lovell M.S., CFY-SLP                   Time Calculation:         Therapy Charges for Today     Code Description Service Date Service Provider Modifiers Qty    07738721520 HC ST TREATMENT SPEECH 3 7/8/2021 Claribel Lovell MS, CFY-SLP GN 1    [id number] HC ST TREATMENT SPEECH 3 7/8/2021 Claribel Lovell MS, CFY-SLP GN 1                     Claribel Lovell MS, CFY-SLP  7/9/2021

## 2021-07-09 NOTE — PROGRESS NOTES
PROGRESS NOTE         Patient Identification:  Name:  Chance Lopez  Age:  65 y.o.  Sex:  male  :  1955  MRN:  [id number]  Visit Number:  [id number]  Primary Care Provider:  Ramos Gurrola MD         LOS: 8 days       ----------------------------------------------------------------------------------------------------------------------  Subjective       Chief Complaints:    Debility      Interval History:    65-year-old gentleman who was recently transferred from [facility] after CVA involving the bihemispheric MCA distribution    Awake and alert, states he is feeling great this morning.  Denies any complaints or issues.  Magnesium was low at 1.9 and has been replaced.    Participated with physical and Occupational Therapy on : Performs transfer activities with contact-guard to standby assist with verbal/nonverbal cues; utilizes front wheeled walker/wheelchair for chair to bed, sit to stand, stand to sit transfer; ambulated 320 feet with wheeled walker and contact-guard with verbal/nonverbal cues; climbed 15 stairs with contact-guard assistance; perform therapeutic exercises of the hip, knee, ankle; spent 15 minutes on recumbent stationary bike at level 1; participated with therapeutic exercise/ROM; gross motor coordination activities; fine motor manipulation/dexterity activities.    Review of Systems:    Constitutional: no fever, chills and night sweats.  Generalized fatigue.  Eyes: no eye drainage, itching or redness.  HEENT: no mouth sores, dysphagia or nose bleed.  Respiratory: no for shortness of breath, cough or production of sputum.  Cardiovascular: no chest pain, no palpitations, no orthopnea.  Gastrointestinal: no nausea, vomiting or diarrhea. No abdominal pain, hematemesis or rectal bleeding.  Genitourinary: no dysuria or polyuria.  Hematologic/lymphatic: no lymph node abnormalities, no easy bruising or easy bleeding.  Musculoskeletal: no muscle or joint pain.  Skin: No rash  and no itching.  Neurological: mild confusion  Behavioral/Psych: no depression or suicidal ideation.  Endocrine: no hot flashes.  Immunologic: negative.    ----------------------------------------------------------------------------------------------------------------------      Objective       Current [facility] Meds:  aspirin, 81 mg, Oral, Daily  atorvastatin, 40 mg, Oral, Nightly  carvedilol, 12.5 mg, Oral, BID With Meals  cetirizine, 10 mg, Oral, Daily  clopidogrel, 75 mg, Oral, Daily  enoxaparin, 40 mg, Subcutaneous, Q24H  fluticasone, 2 spray, Each Nare, Daily  insulin aspart, 10 Units, Subcutaneous, TID With Meals  insulin detemir, 15 Units, Subcutaneous, Nightly  magnesium sulfate, 4 g, Intravenous, Once  multivitamin, 1 tablet, Oral, Daily  OLANZapine, 5 mg, Oral, Nightly  PARoxetine, 20 mg, Oral, Daily  sennosides-docusate, 2 tablet, Oral, BID         ----------------------------------------------------------------------------------------------------------------------    Vital Signs:  Temp:  [97.8 °F (36.6 °C)] 97.8 °F (36.6 °C)  Heart Rate:  [70-72] 72  Resp:  [20] 20  BP: (122-130)/(66-68) 130/66  No data found.  SpO2 Percentage    07/07/21 1900 07/08/21 0800 07/08/21 1900   SpO2: 98% 100% 98%     SpO2:  [98 %] 98 %  on   ;   Device (Oxygen Therapy): room air    Body mass index is 24.39 kg/m².  Wt Readings from Last 3 Encounters:   07/01/21 79.3 kg (174 lb 13.2 oz)   01/04/19 83.6 kg (184 lb 3.2 oz)   12/14/18 83.9 kg (184 lb 15.5 oz)        Intake/Output Summary (Last 24 hours) at 7/9/2021 1024  Last data filed at 7/9/2021 0758  Gross per 24 hour   Intake 1300 ml   Output --   Net 1300 ml     Diet Soft Texture; Chopped; Thin; Consistent Carbohydrate  ----------------------------------------------------------------------------------------------------------------------      Physical Exam:    General Appearance: Awake and alert, states he is feeling great.  No complaints or issues.    Head: normocephalic,  without obvious abnormality and atraumatic    Lungs: clear to auscultation, respirations regular, respirations even and  respirations unlabored. No accessory muscle use.     Heart:: regular rhythm & normal rate, normal S1, S2, no murmur, no gallop, no  rub and no click.  Chest wall with no abnormalities observed. PMI nondisplaced    Abdomen: normal bowel sounds in all quadrants, no masses, no hepatomegaly,  no splenomegaly, soft non-tender, no guarding and no rebound tenderness    Extremities: no edema, no cyanosis, no redness, no tenderness, no clubbing    Musculoskeletal: joints with no effusion nor erythema nor warmth.  Pedal pulses palpable and equal bilaterally    Skin: no bleeding, bruising or rash and no lesions noted    Lymph Nodes: no palpable adenopathy    Neurologic:    Mental Status: Patient is awake alert and able to follow commands. Mild confusion.   Sensory: Sensory overall seems to be intact in BLE and BUE.   Motor strength: Generalized weakness        ----------------------------------------------------------------------------------------------------------------------            Results from last 7 days   Lab Units 07/09/21  0153   WBC 10*3/mm3 4.30   HEMOGLOBIN g/dL 11.8*   HEMATOCRIT % 34.7*   MCV fL 97.7*   MCHC g/dL 34.0   PLATELETS 10*3/mm3 221     Results from last 7 days   Lab Units 07/09/21  0153 07/04/21  0117 07/03/21  0111   SODIUM mmol/L 139 138  --    POTASSIUM mmol/L 4.6 4.8  --    MAGNESIUM mg/dL 1.9  --  2.1   CHLORIDE mmol/L 103 101  --    CO2 mmol/L 23.5 27.4  --    BUN mg/dL 28* 20  --    CREATININE mg/dL 1.43* 1.44*  --    EGFR IF NONAFRICN AM mL/min/1.73 50* 49*  --    CALCIUM mg/dL 9.5 9.4  --    GLUCOSE mg/dL 100* 304*  --    ALBUMIN g/dL 3.58  --   --    BILIRUBIN mg/dL 0.3  --   --    ALK PHOS U/L 100  --   --    AST (SGOT) U/L 30  --   --    ALT (SGPT) U/L 34  --   --    Estimated Creatinine Clearance: 57.8 mL/min (A) (by C-G formula based on SCr of 1.43 mg/dL (H)).  No  results found for: AMMONIA    Glucose   Date/Time Value Ref Range Status   07/09/2021 0606 95 70 - 130 mg/dL Final     Comment:     Meter: [id number] : [id number] Veronica Batista   07/08/2021 1950 132 (H) 70 - 130 mg/dL Final     Comment:     Meter: [id number] : [id number] Bobby Lawrence C   07/08/2021 1730 208 (H) 70 - 130 mg/dL Final     Comment:     Meter: [id number] : [id number] monholprashanth heather   07/08/2021 1602 95 70 - 130 mg/dL Final     Comment:     Meter: [id number] : [id number] NAN THOMAS   07/08/2021 1119 166 (H) 70 - 130 mg/dL Final     Comment:     Meter: [id number] : [id number] kev wisdom   07/08/2021 0612 137 (H) 70 - 130 mg/dL Final     Comment:     Meter: [id number] : [id number] Bobby DAVILA   07/07/2021 1951 227 (H) 70 - 130 mg/dL Final     Comment:     Meter: [id number] : [id number] Bobby Lawrence C   07/07/2021 1621 246 (H) 70 - 130 mg/dL Final     Comment:     Meter: [id number] : [id number] Bijan Cordon     No results found for: HGBA1C  No results found for: TSH, FREET4    No results found for: BLOODCX  No results found for: URINECX  No results found for: WOUNDCX  No results found for: STOOLCX  No results found for: RESPCX  Pain Management Panel    There is no flowsheet data to display.           ----------------------------------------------------------------------------------------------------------------------  Imaging Results (Last 24 Hours)       ** No results found for the last 24 hours. **            ----------------------------------------------------------------------------------------------------------------------    Assessment/Plan       Assessment/Plan     ASSESSMENT:    Status post CVA with bihemispheric MCA infarctions  Diabetes mellitus  Acute kidney injury  Hypertension  Encephalopathy      PLAN:    Awake and alert, states he is feeling great this morning.  Denies any complaints or issues.  Magnesium was low at 1.9 and has been  replaced.    Participated with physical and Occupational Therapy on 7/8: Performs transfer activities with contact-guard to standby assist with verbal/nonverbal cues; utilizes front wheeled walker/wheelchair for chair to bed, sit to stand, stand to sit transfer; ambulated 320 feet with wheeled walker and contact-guard with verbal/nonverbal cues; climbed 15 stairs with contact-guard assistance; perform therapeutic exercises of the hip, knee, ankle; spent 15 minutes on recumbent stationary bike at level 1; participated with therapeutic exercise/ROM; gross motor coordination activities; fine motor manipulation/dexterity activities.    Diabetes mellitus-- 15 units subcu nightly    Acute kidney injury--creatinine stable      Hypertension continue Coreg 12.5 mg twice daily     Recent issues with acute encephalopathy-- Paxil and Zyprexa    Hypomagnesemia-repleted        Code Status:   Code Status and Medical Interventions:   Ordered at: 07/01/21 1835     Level Of Support Discussed With:    Patient     Code Status:    CPR     Medical Interventions (Level of Support Prior to Arrest):    Full     Scribed for Chelsie Mccauley MD by SANTA Ribeiro. 7/9/2021  10:24 EDT       SANTA Ribeiro  07/09/21  10:24 EDT    Physician Attestation:    The documentation recorded by the scribe accurately reflects the service I personally performed and the decisions made by me.    Chelsie Mccauley MD  Infectious Diseases  07/09/21  10:24 EDT

## 2021-07-10 LAB
GLUCOSE BLDC GLUCOMTR-MCNC: 187 MG/DL (ref 70–130)
GLUCOSE BLDC GLUCOMTR-MCNC: 213 MG/DL (ref 70–130)
GLUCOSE BLDC GLUCOMTR-MCNC: 277 MG/DL (ref 70–130)
GLUCOSE BLDC GLUCOMTR-MCNC: 313 MG/DL (ref 70–130)
MAGNESIUM SERPL-MCNC: 2.5 MG/DL (ref 1.6–2.4)

## 2021-07-10 PROCEDURE — 82962 GLUCOSE BLOOD TEST: CPT

## 2021-07-10 PROCEDURE — 63710000001 INSULIN DETEMIR PER 5 UNITS: Performed by: INTERNAL MEDICINE

## 2021-07-10 PROCEDURE — 63710000001 INSULIN ASPART PER 5 UNITS: Performed by: FAMILY MEDICINE

## 2021-07-10 PROCEDURE — 83735 ASSAY OF MAGNESIUM: CPT | Performed by: INTERNAL MEDICINE

## 2021-07-10 PROCEDURE — 25010000002 ENOXAPARIN PER 10 MG: Performed by: FAMILY MEDICINE

## 2021-07-10 RX ADMIN — INSULIN DETEMIR 15 UNITS: 100 INJECTION, SOLUTION SUBCUTANEOUS at 20:00

## 2021-07-10 RX ADMIN — DOCUSATE SODIUM 50 MG AND SENNOSIDES 8.6 MG 2 TABLET: 8.6; 5 TABLET, FILM COATED ORAL at 20:03

## 2021-07-10 RX ADMIN — CETIRIZINE HYDROCHLORIDE 10 MG: 10 TABLET, FILM COATED ORAL at 08:38

## 2021-07-10 RX ADMIN — ATORVASTATIN CALCIUM 40 MG: 40 TABLET, FILM COATED ORAL at 20:03

## 2021-07-10 RX ADMIN — FLUTICASONE PROPIONATE 2 SPRAY: 50 SPRAY, METERED NASAL at 08:38

## 2021-07-10 RX ADMIN — INSULIN ASPART 10 UNITS: 100 INJECTION, SOLUTION INTRAVENOUS; SUBCUTANEOUS at 17:11

## 2021-07-10 RX ADMIN — CARVEDILOL 12.5 MG: 6.25 TABLET, FILM COATED ORAL at 08:38

## 2021-07-10 RX ADMIN — OLANZAPINE 5 MG: 5 TABLET, FILM COATED ORAL at 20:03

## 2021-07-10 RX ADMIN — PAROXETINE HYDROCHLORIDE 20 MG: 20 TABLET, FILM COATED ORAL at 08:38

## 2021-07-10 RX ADMIN — INSULIN ASPART 10 UNITS: 100 INJECTION, SOLUTION INTRAVENOUS; SUBCUTANEOUS at 08:58

## 2021-07-10 RX ADMIN — INSULIN ASPART 10 UNITS: 100 INJECTION, SOLUTION INTRAVENOUS; SUBCUTANEOUS at 13:31

## 2021-07-10 RX ADMIN — Medication 1 TABLET: at 08:38

## 2021-07-10 RX ADMIN — ENOXAPARIN SODIUM 40 MG: 40 INJECTION SUBCUTANEOUS at 20:03

## 2021-07-10 RX ADMIN — CLOPIDOGREL 75 MG: 75 TABLET, FILM COATED ORAL at 08:38

## 2021-07-10 RX ADMIN — DOCUSATE SODIUM 50 MG AND SENNOSIDES 8.6 MG 2 TABLET: 8.6; 5 TABLET, FILM COATED ORAL at 08:38

## 2021-07-10 RX ADMIN — ASPIRIN 81 MG: 81 TABLET, COATED ORAL at 08:38

## 2021-07-10 RX ADMIN — CARVEDILOL 12.5 MG: 6.25 TABLET, FILM COATED ORAL at 17:10

## 2021-07-10 NOTE — PROGRESS NOTES
PROGRESS NOTE         Patient Identification:  Name:  Chance Lopez  Age:  65 y.o.  Sex:  male  :  1955  MRN:  [id number]  Visit Number:  [id number]  Primary Care Provider:  Ramos Gurrola MD         LOS: 9 days       ----------------------------------------------------------------------------------------------------------------------  Subjective       Chief Complaints:    Debility      Interval History:    65-year-old gentleman who was recently transferred from [facility] after CVA involving the bihemispheric MCA distribution    Patient had an uneventful night with no issues reported, vitals are stable with no fever documented overnight.  Patient denies any chest pain, shortness of breath, nausea, vomiting, diarrhea or headache.  Has been participating with physical therapy without any difficulty.  Seems to be fairly comfortable this morning with no evidence of acute distress.    Participated with physical and Occupational Therapy on : Performs transfer activities with contact-guard to standby assist with verbal/nonverbal cues; utilizes front wheeled walker/wheelchair for chair to bed, sit to stand, stand to sit transfer; ambulated 320 feet with wheeled walker and contact-guard with verbal/nonverbal cues; climbed 15 stairs with contact-guard assistance; perform therapeutic exercises of the hip, knee, ankle; spent 15 minutes on recumbent stationary bike at level 1; participated with therapeutic exercise/ROM; gross motor coordination activities; fine motor manipulation/dexterity activities.        ----------------------------------------------------------------------------------------------------------------------      Objective       Current [facility] Meds:  aspirin, 81 mg, Oral, Daily  atorvastatin, 40 mg, Oral, Nightly  carvedilol, 12.5 mg, Oral, BID With Meals  cetirizine, 10 mg, Oral, Daily  clopidogrel, 75 mg, Oral, Daily  enoxaparin, 40 mg, Subcutaneous, Q24H  fluticasone, 2  spray, Each Nare, Daily  insulin aspart, 10 Units, Subcutaneous, TID With Meals  insulin detemir, 15 Units, Subcutaneous, Nightly  multivitamin, 1 tablet, Oral, Daily  OLANZapine, 5 mg, Oral, Nightly  PARoxetine, 20 mg, Oral, Daily  sennosides-docusate, 2 tablet, Oral, BID         ----------------------------------------------------------------------------------------------------------------------    Vital Signs:  Temp:  [98.2 °F (36.8 °C)] 98.2 °F (36.8 °C)  Heart Rate:  [68] 68  Resp:  [16] 16  BP: (135)/(68) 135/68  No data found.  SpO2 Percentage    07/08/21 0800 07/08/21 1900 07/09/21 1900   SpO2: 100% 98% 98%     SpO2:  [98 %] 98 %  on   ;   Device (Oxygen Therapy): room air    Body mass index is 24.39 kg/m².  Wt Readings from Last 3 Encounters:   07/01/21 79.3 kg (174 lb 13.2 oz)   01/04/19 83.6 kg (184 lb 3.2 oz)   12/14/18 83.9 kg (184 lb 15.5 oz)        Intake/Output Summary (Last 24 hours) at 7/10/2021 0845  Last data filed at 7/10/2021 0500  Gross per 24 hour   Intake 1060 ml   Output --   Net 1060 ml     Diet Soft Texture; Chopped; Thin; Consistent Carbohydrate  ----------------------------------------------------------------------------------------------------------------------      Physical Exam:    General Appearance: Awake and alert, states he is feeling great.  No complaints or issues.  No therapy today.    Head: normocephalic, without obvious abnormality and atraumatic    Lungs: clear to auscultation, respirations regular, respirations even and  respirations unlabored. No accessory muscle use.     Heart:: regular rhythm & normal rate, normal S1, S2, no murmur, no gallop, no  rub and no click.  Chest wall with no abnormalities observed. PMI nondisplaced    Abdomen: normal bowel sounds in all quadrants, no masses, no hepatomegaly,  no splenomegaly, soft non-tender, no guarding and no rebound tenderness    Extremities: no edema, no cyanosis, no redness, no tenderness, no clubbing    Musculoskeletal:  joints with no effusion nor erythema nor warmth.  Pedal pulses palpable and equal bilaterally    Skin: no bleeding, bruising or rash and no lesions noted    Lymph Nodes: no palpable adenopathy    Neurologic:    Mental Status: Patient is awake alert and able to follow commands. Mild confusion.   Sensory: Sensory overall seems to be intact in BLE and BUE.   Motor strength: Generalized weakness        ----------------------------------------------------------------------------------------------------------------------            Results from last 7 days   Lab Units 07/09/21  0153   WBC 10*3/mm3 4.30   HEMOGLOBIN g/dL 11.8*   HEMATOCRIT % 34.7*   MCV fL 97.7*   MCHC g/dL 34.0   PLATELETS 10*3/mm3 221     Results from last 7 days   Lab Units 07/10/21  0228 07/09/21  0153 07/04/21  0117   SODIUM mmol/L  --  139 138   POTASSIUM mmol/L  --  4.6 4.8   MAGNESIUM mg/dL 2.5* 1.9  --    CHLORIDE mmol/L  --  103 101   CO2 mmol/L  --  23.5 27.4   BUN mg/dL  --  28* 20   CREATININE mg/dL  --  1.43* 1.44*   EGFR IF NONAFRICN AM mL/min/1.73  --  50* 49*   CALCIUM mg/dL  --  9.5 9.4   GLUCOSE mg/dL  --  100* 304*   ALBUMIN g/dL  --  3.58  --    BILIRUBIN mg/dL  --  0.3  --    ALK PHOS U/L  --  100  --    AST (SGOT) U/L  --  30  --    ALT (SGPT) U/L  --  34  --    Estimated Creatinine Clearance: 57.8 mL/min (A) (by C-G formula based on SCr of 1.43 mg/dL (H)).  No results found for: AMMONIA    Glucose   Date/Time Value Ref Range Status   07/10/2021 0601 187 (H) 70 - 130 mg/dL Final     Comment:     Meter: [id number] : [id number] Veronica Skylett   07/09/2021 2020 247 (H) 70 - 130 mg/dL Final     Comment:     Meter: [id number] : [id number] Bobby DAVILA   07/09/2021 1617 196 (H) 70 - 130 mg/dL Final     Comment:     Meter: [id number] : [id number] kev wisdom   07/09/2021 1134 269 (H) 70 - 130 mg/dL Final     Comment:     RN Notified Meter: [id number] : [id number] NAN THOMAS   07/09/2021 0606 95 70 - 130 mg/dL  Final     Comment:     Meter: [id number] : [id number] Veronica Batista   07/08/2021 1950 132 (H) 70 - 130 mg/dL Final     Comment:     Meter: [id number] : [id number] Bobby Lawrence C   07/08/2021 1730 208 (H) 70 - 130 mg/dL Final     Comment:     Meter: [id number] : [id number] monhollin heather   07/08/2021 1602 95 70 - 130 mg/dL Final     Comment:     Meter: [id number] : [id number] NAN THOMAS     No results found for: HGBA1C  No results found for: TSH, FREET4    No results found for: BLOODCX  No results found for: URINECX  No results found for: WOUNDCX  No results found for: STOOLCX  No results found for: RESPCX  Pain Management Panel    There is no flowsheet data to display.           ----------------------------------------------------------------------------------------------------------------------  Imaging Results (Last 24 Hours)     ** No results found for the last 24 hours. **          ----------------------------------------------------------------------------------------------------------------------    Assessment/Plan       Assessment/Plan     ASSESSMENT:    Status post CVA with bihemispheric MCA infarctions  Diabetes mellitus  Acute kidney injury  Hypertension  Encephalopathy      PLAN:    Patient had an uneventful night with no issues reported, vitals are stable with no fever documented overnight.  Patient denies any chest pain, shortness of breath, nausea, vomiting, diarrhea or headache.  Has been participating with physical therapy without any difficulty.  Seems to be fairly comfortable this morning with no evidence of acute distress.    Magnesium was low at 1.9 and has been replaced.    Participated with physical and Occupational Therapy on 7/8: Performs transfer activities with contact-guard to standby assist with verbal/nonverbal cues; utilizes front wheeled walker/wheelchair for chair to bed, sit to stand, stand to sit transfer; ambulated 320 feet with wheeled walker and  contact-guard with verbal/nonverbal cues; climbed 15 stairs with contact-guard assistance; perform therapeutic exercises of the hip, knee, ankle; spent 15 minutes on recumbent stationary bike at level 1; participated with therapeutic exercise/ROM; gross motor coordination activities; fine motor manipulation/dexterity activities.    Diabetes mellitus-- 15 units subcu nightly    Acute kidney injury--creatinine stable      Hypertension continue Coreg 12.5 mg twice daily     Recent issues with acute encephalopathy-- Paxil and Zyprexa    Hypomagnesemia-repleted        Code Status:   Code Status and Medical Interventions:   Ordered at: 07/01/21 1835     Level Of Support Discussed With:    Patient     Code Status:    CPR     Medical Interventions (Level of Support Prior to Arrest):    Full     Chelsie Mccauley MD  07/10/21  08:46 EDT

## 2021-07-10 NOTE — PROGRESS NOTES
Rehabilitation Nursing  Inpatient Rehabilitation Plan of Care Note    Plan of Care  Pain    Pain Management (Active)  Current Status (7/1/2021 5:54:00 PM): Potential for pain  Weekly Goal: No pain this week  Discharge Goal: No pain    Safety    Potential for Injury (Active)  Current Status (7/1/2021 5:54:00 PM): At risk for injury  Weekly Goal: No injury this week  Discharge Goal: No injury    Signed by: Claudia Allerd Nurse

## 2021-07-11 LAB
GLUCOSE BLDC GLUCOMTR-MCNC: 165 MG/DL (ref 70–130)
GLUCOSE BLDC GLUCOMTR-MCNC: 181 MG/DL (ref 70–130)
GLUCOSE BLDC GLUCOMTR-MCNC: 253 MG/DL (ref 70–130)
GLUCOSE BLDC GLUCOMTR-MCNC: 333 MG/DL (ref 70–130)

## 2021-07-11 PROCEDURE — 63710000001 INSULIN DETEMIR PER 5 UNITS: Performed by: INTERNAL MEDICINE

## 2021-07-11 PROCEDURE — 25010000002 ENOXAPARIN PER 10 MG: Performed by: FAMILY MEDICINE

## 2021-07-11 PROCEDURE — 63710000001 INSULIN ASPART PER 5 UNITS: Performed by: FAMILY MEDICINE

## 2021-07-11 PROCEDURE — 82962 GLUCOSE BLOOD TEST: CPT

## 2021-07-11 RX ADMIN — ATORVASTATIN CALCIUM 40 MG: 40 TABLET, FILM COATED ORAL at 20:01

## 2021-07-11 RX ADMIN — Medication 1 TABLET: at 08:33

## 2021-07-11 RX ADMIN — INSULIN DETEMIR 15 UNITS: 100 INJECTION, SOLUTION SUBCUTANEOUS at 21:55

## 2021-07-11 RX ADMIN — INSULIN ASPART 10 UNITS: 100 INJECTION, SOLUTION INTRAVENOUS; SUBCUTANEOUS at 08:34

## 2021-07-11 RX ADMIN — PAROXETINE HYDROCHLORIDE 20 MG: 20 TABLET, FILM COATED ORAL at 08:33

## 2021-07-11 RX ADMIN — CARVEDILOL 12.5 MG: 6.25 TABLET, FILM COATED ORAL at 08:33

## 2021-07-11 RX ADMIN — ASPIRIN 81 MG: 81 TABLET, COATED ORAL at 08:33

## 2021-07-11 RX ADMIN — CARVEDILOL 12.5 MG: 6.25 TABLET, FILM COATED ORAL at 17:09

## 2021-07-11 RX ADMIN — INSULIN ASPART 10 UNITS: 100 INJECTION, SOLUTION INTRAVENOUS; SUBCUTANEOUS at 11:42

## 2021-07-11 RX ADMIN — CETIRIZINE HYDROCHLORIDE 10 MG: 10 TABLET, FILM COATED ORAL at 08:33

## 2021-07-11 RX ADMIN — ENOXAPARIN SODIUM 40 MG: 40 INJECTION SUBCUTANEOUS at 20:01

## 2021-07-11 RX ADMIN — DOCUSATE SODIUM 50 MG AND SENNOSIDES 8.6 MG 2 TABLET: 8.6; 5 TABLET, FILM COATED ORAL at 20:01

## 2021-07-11 RX ADMIN — INSULIN ASPART 10 UNITS: 100 INJECTION, SOLUTION INTRAVENOUS; SUBCUTANEOUS at 17:09

## 2021-07-11 RX ADMIN — OLANZAPINE 5 MG: 5 TABLET, FILM COATED ORAL at 20:01

## 2021-07-11 RX ADMIN — FLUTICASONE PROPIONATE 2 SPRAY: 50 SPRAY, METERED NASAL at 11:10

## 2021-07-11 RX ADMIN — DOCUSATE SODIUM 50 MG AND SENNOSIDES 8.6 MG 2 TABLET: 8.6; 5 TABLET, FILM COATED ORAL at 08:33

## 2021-07-11 RX ADMIN — CLOPIDOGREL 75 MG: 75 TABLET, FILM COATED ORAL at 08:33

## 2021-07-11 NOTE — PLAN OF CARE
Goal Outcome Evaluation:  Plan of Care Reviewed With: patient        Progress: improving     Stable, no complaints this shift.

## 2021-07-12 LAB
GLUCOSE BLDC GLUCOMTR-MCNC: 200 MG/DL (ref 70–130)
GLUCOSE BLDC GLUCOMTR-MCNC: 207 MG/DL (ref 70–130)
GLUCOSE BLDC GLUCOMTR-MCNC: 240 MG/DL (ref 70–130)
GLUCOSE BLDC GLUCOMTR-MCNC: 314 MG/DL (ref 70–130)

## 2021-07-12 PROCEDURE — 97530 THERAPEUTIC ACTIVITIES: CPT

## 2021-07-12 PROCEDURE — 63710000001 INSULIN ASPART PER 5 UNITS: Performed by: FAMILY MEDICINE

## 2021-07-12 PROCEDURE — 63710000001 INSULIN DETEMIR PER 5 UNITS: Performed by: INTERNAL MEDICINE

## 2021-07-12 PROCEDURE — 25010000002 ENOXAPARIN PER 10 MG: Performed by: FAMILY MEDICINE

## 2021-07-12 PROCEDURE — 97110 THERAPEUTIC EXERCISES: CPT

## 2021-07-12 PROCEDURE — 82962 GLUCOSE BLOOD TEST: CPT

## 2021-07-12 PROCEDURE — 97116 GAIT TRAINING THERAPY: CPT

## 2021-07-12 PROCEDURE — 97530 THERAPEUTIC ACTIVITIES: CPT | Performed by: OCCUPATIONAL THERAPIST

## 2021-07-12 PROCEDURE — 97112 NEUROMUSCULAR REEDUCATION: CPT | Performed by: OCCUPATIONAL THERAPIST

## 2021-07-12 PROCEDURE — 99231 SBSQ HOSP IP/OBS SF/LOW 25: CPT | Performed by: FAMILY MEDICINE

## 2021-07-12 PROCEDURE — 97535 SELF CARE MNGMENT TRAINING: CPT | Performed by: OCCUPATIONAL THERAPIST

## 2021-07-12 PROCEDURE — 92507 TX SP LANG VOICE COMM INDIV: CPT

## 2021-07-12 RX ADMIN — DOCUSATE SODIUM 50 MG AND SENNOSIDES 8.6 MG 2 TABLET: 8.6; 5 TABLET, FILM COATED ORAL at 08:36

## 2021-07-12 RX ADMIN — CLOPIDOGREL 75 MG: 75 TABLET, FILM COATED ORAL at 08:37

## 2021-07-12 RX ADMIN — CARVEDILOL 12.5 MG: 6.25 TABLET, FILM COATED ORAL at 08:37

## 2021-07-12 RX ADMIN — INSULIN DETEMIR 15 UNITS: 100 INJECTION, SOLUTION SUBCUTANEOUS at 22:07

## 2021-07-12 RX ADMIN — ENOXAPARIN SODIUM 40 MG: 40 INJECTION SUBCUTANEOUS at 20:52

## 2021-07-12 RX ADMIN — INSULIN ASPART 10 UNITS: 100 INJECTION, SOLUTION INTRAVENOUS; SUBCUTANEOUS at 12:20

## 2021-07-12 RX ADMIN — OLANZAPINE 5 MG: 5 TABLET, FILM COATED ORAL at 20:53

## 2021-07-12 RX ADMIN — INSULIN ASPART 10 UNITS: 100 INJECTION, SOLUTION INTRAVENOUS; SUBCUTANEOUS at 17:59

## 2021-07-12 RX ADMIN — ASPIRIN 81 MG: 81 TABLET, COATED ORAL at 08:37

## 2021-07-12 RX ADMIN — INSULIN ASPART 10 UNITS: 100 INJECTION, SOLUTION INTRAVENOUS; SUBCUTANEOUS at 08:36

## 2021-07-12 RX ADMIN — CETIRIZINE HYDROCHLORIDE 10 MG: 10 TABLET, FILM COATED ORAL at 08:37

## 2021-07-12 RX ADMIN — Medication 1 TABLET: at 08:37

## 2021-07-12 RX ADMIN — FLUTICASONE PROPIONATE 2 SPRAY: 50 SPRAY, METERED NASAL at 08:37

## 2021-07-12 RX ADMIN — CARVEDILOL 12.5 MG: 6.25 TABLET, FILM COATED ORAL at 17:59

## 2021-07-12 RX ADMIN — DOCUSATE SODIUM 50 MG AND SENNOSIDES 8.6 MG 2 TABLET: 8.6; 5 TABLET, FILM COATED ORAL at 20:53

## 2021-07-12 RX ADMIN — PAROXETINE HYDROCHLORIDE 20 MG: 20 TABLET, FILM COATED ORAL at 08:36

## 2021-07-12 RX ADMIN — ATORVASTATIN CALCIUM 40 MG: 40 TABLET, FILM COATED ORAL at 20:53

## 2021-07-12 NOTE — THERAPY PROGRESS REPORT/RE-CERT
Inpatient Rehabilitation - Occupational Therapy Progress Note     Elie     Patient Name: Chance Lopez  : 1955  MRN: [id number]    Today's Date: 2021                 Admit Date: 2021       No diagnosis found.    Patient Active Problem List   Diagnosis   • Colon cancer screening   • Gastritis   • CVA (cerebral vascular accident) (CMS/HCC)       Past Medical History:   Diagnosis Date   • Diabetes mellitus (CMS/HCC)    • Diarrhea    • Full dentures     INSTRUCTED NO ADHESIVES THE DOS. REPORTS USUALLY ONLY WEARS UPPER PLATE.   • Hearing loss     REPORTS MORE LOSS ON LEFT SIDE BUT THAT HE HAS BILATERAL LOSS. NO USE OF HEARING AIDS.   • Hepatitis     REPORTS AS A CHILD.  UNSURE IF TYPE A OR B.   • History of acute pancreatitis     REPORTS PRIOR TO    • History of gout    • Hypertension    • Seasonal allergies    • Stroke (CMS/HCC)    • Wears glasses     RX GLASSES       Past Surgical History:   Procedure Laterality Date   • COLONOSCOPY N/A 2018    Procedure: COLONOSCOPY;  Surgeon: Ha Sykes MD;  Location: [facility] ENDOSCOPY;  Service: Gastroenterology   • ENDOSCOPY N/A 2018    Procedure: ESOPHAGOGASTRODUODENOSCOPY with cold forcep biopsy;  Surgeon: Ha Sykes MD;  Location: [facility] ENDOSCOPY;  Service: Gastroenterology   • TOOTH EXTRACTION                  IRF OT ASSESSMENT FLOWSHEET (last 12 hours)      IRF OT Evaluation and Treatment     Row Name 21 1400          OT Time and Intention    Document Type  daily treatment;progress note  -     Mode of Treatment  individual therapy;occupational therapy  -     Patient Effort  good  -     Row Name 21 1400          General Information    Patient/Family/Caregiver Comments/Observations  patient agreeable to therapy  today.  -     Existing Precautions/Restrictions  fall  -     Row Name 21 1400          Transfers    Bed-Chair [place] (Transfers)  contact guard;verbal cues  -     Row Name 21 1400           Motor Skills    Motor Skills  coordination;functional endurance;therapeutic exercise  -     Therapeutic Exercise  -- BUE UE bike, gmc,fmc  -     Row Name 07/12/21 1400          Upper Body Dressing    [place] Level (Upper Body Dressing)  supervision;verbal cues  -     Row Name 07/12/21 1400          Lower Body Dressing    [place] Level (Lower Body Dressing)  supervision;verbal cues  -     Row Name 07/12/21 1400          Grooming    [place] Level (Grooming)  verbal cues;supervision  -     Row Name 07/12/21 1400          Self-Feeding    [place] Level (Self-Feeding)  supervision;verbal cues  -       User Key  (r) = Recorded By, (t) = Taken By, (c) = Cosigned By    Initials Name Effective Dates    Ambreen Montgomery, OT 06/16/21 -            Occupational Therapy Education                 Title: PT OT SLP Therapies (In Progress)     Topic: Occupational Therapy (In Progress)     Point: ADL training (In Progress)     Description:   Instruct learner(s) on proper safety adaptation and remediation techniques during self care or transfers.   Instruct in proper use of assistive devices.              Learning Progress Summary           Patient Acceptance, E,D, NR by  at 7/8/2021 1245    Acceptance, E,TB, VU by  at 7/5/2021 1613                   Point: Home exercise program (Done)     Description:   Instruct learner(s) on appropriate technique for monitoring, assisting and/or progressing therapeutic exercises/activities.              Learning Progress Summary           Patient Acceptance, E,TB, VU by RF at 7/5/2021 1613                   Point: Precautions (In Progress)     Description:   Instruct learner(s) on prescribed precautions during self-care and functional transfers.              Learning Progress Summary           Patient Acceptance, E,D, NR by  at 7/8/2021 1245    Acceptance, E,TB, VU by  at 7/5/2021 1613                   Point: Body mechanics (Done)     Description:    Instruct learner(s) on proper positioning and spine alignment during self-care, functional mobility activities and/or exercises.              Learning Progress Summary           Patient Acceptance, E,TB, VU by  at 7/5/2021 1613                               User Key     Initials Effective Dates Name Provider Type Discipline     06/16/21 -  Farnaz Camejo, OT Occupational Therapist OT     06/16/21 -  Nuris Mccollum, PTA Physical Therapy Assistant PT                    OT Recommendation and Plan    Planned Therapy Interventions (OT): activity tolerance training, adaptive equipment training, BADL retraining, neuromuscular control/coordination retraining, patient/caregiver education/training, ROM/therapeutic exercise, strengthening exercise, transfer/mobility retraining                    Time Calculation:     Time Calculation- OT     Row Name 07/12/21 1409             Time Calculation-     OT Start Time  0745  -      OT Stop Time  0915  -      OT Time Calculation (min)  90 min  -        User Key  (r) = Recorded By, (t) = Taken By, (c) = Cosigned By    Initials Name Provider Type     Ambreen Marinelli, HERMILA Occupational Therapist        Therapy Charges for Today     Code Description Service Date Service Provider Modifiers Qty    81439938420 HC OT THERAPEUTIC ACT EA 15 MIN 7/12/2021 Ambreen Marinelli OT GO 2    29597806901 HC OT SELF CARE/MGMT/TRAIN EA 15 MIN 7/12/2021 Ambreen Marinelli OT GO 2    [id number]  OT NEUROMUSC RE EDUCATION EA 15 MIN 7/12/2021 Ambreen Marinelli OT GO 2                   Ambreen Marinelli OT  7/12/2021

## 2021-07-12 NOTE — THERAPY TREATMENT NOTE
Inpatient Rehabilitation - Physical Therapy Treatment Note        Elie     Patient Name: Chance Lopez  : 1955  MRN: [id number]    Today's Date: 2021                    Admit Date: 2021      Visit Dx: No diagnosis found.    Patient Active Problem List   Diagnosis   • Colon cancer screening   • Gastritis   • CVA (cerebral vascular accident) (CMS/HCC)       Past Medical History:   Diagnosis Date   • Diabetes mellitus (CMS/HCC)    • Diarrhea    • Full dentures     INSTRUCTED NO ADHESIVES THE DOS. REPORTS USUALLY ONLY WEARS UPPER PLATE.   • Hearing loss     REPORTS MORE LOSS ON LEFT SIDE BUT THAT HE HAS BILATERAL LOSS. NO USE OF HEARING AIDS.   • Hepatitis     REPORTS AS A CHILD.  UNSURE IF TYPE A OR B.   • History of acute pancreatitis     REPORTS PRIOR TO    • History of gout    • Hypertension    • Seasonal allergies    • Stroke (CMS/HCC)    • Wears glasses     RX GLASSES       Past Surgical History:   Procedure Laterality Date   • COLONOSCOPY N/A 2018    Procedure: COLONOSCOPY;  Surgeon: Ha Sykes MD;  Location: [facility] ENDOSCOPY;  Service: Gastroenterology   • ENDOSCOPY N/A 2018    Procedure: ESOPHAGOGASTRODUODENOSCOPY with cold forcep biopsy;  Surgeon: Ha Sykes MD;  Location: [facility] ENDOSCOPY;  Service: Gastroenterology   • TOOTH EXTRACTION            PT ASSESSMENT (last 12 hours)      IRF PT Evaluation and Treatment     Row Name 21 1434          PT Time and Intention    Document Type  daily treatment  -     Mode of Treatment  individual therapy;physical therapy  -     Patient/Family/Caregiver Comments/Observations  Pt and nursing in agreement for skilled PT on this date.   -     Row Name 21 1434          Cognition/Psychosocial    Personal Safety Interventions  fall prevention program maintained;gait belt;nonskid shoes/slippers when out of bed  -     Row Name 21 1434          Transfers    Bed-Chair Silver (Transfers)  contact guard  -      Assistive Device (Bed-Chair Transfers)  wheelchair  -RG     Sit-Stand Preston (Transfers)  standby assist;contact guard  -RG     Stand-Sit [place] (Transfers)  verbal cues;standby assist;contact guard  -RG     Preston Level (Toilet Transfer)  contact guard  -RG     Row Name 07/12/21 1434          Sit-Stand Transfer    Assistive Device (Sit-Stand Transfers)  walker, front-wheeled  -RG     Row Name 07/12/21 1434          Stand-Sit Transfer    Assistive Device (Stand-Sit Transfers)  walker, front-wheeled  -RG     Row Name 07/12/21 1434          Toilet Transfer    Type (Toilet Transfer)  stand pivot/stand step  -RG     Row Name 07/12/21 1434          Car Transfer    Type (Car Transfer)  stand pivot/stand step  -RG     [place]on Level (Car Transfer)  contact guard  -RG     Assistive Device (Car Transfer)  walker, front-wheeled  -RG     Row Name 07/12/21 1434          Gait/Stairs (Locomotion)    [place]reston Level (Gait)  contact guard  -RG     Assistive Device (Gait)  walker, front-wheeled  -RG     Row Name 07/12/21 1434          Balance    Balance Assessment  standing dynamic balance  -RG     Dynamic Standing Balance  unsupported;mild impairment  -RG     Comment, Balance  weaving in/out of cones, stepping over cnes, picking up cones s/s  -RG     Row Name 07/12/21 1434          Hip (Therapeutic Exercise)    Hip Strengthening (Therapeutic Exercise)  bilateral;flexion;aBduction;aDduction;marching while seated;sitting;2 lb free weight;resistance band;green;10 repetitions;2 sets  -RG     Row Name 07/12/21 1434          Knee (Therapeutic Exercise)    Knee Strengthening (Therapeutic Exercise)  bilateral;flexion;extension;marching while seated;LAQ (long arc quad);hamstring curls;sitting;2 lb free weight;resistance band;green;10 repetitions;2 sets  -RG     Row Name 07/12/21 1434          Ankle (Therapeutic Exercise)    Ankle Strengthening (Therapeutic Exercise)   bilateral;dorsiflexion;plantarflexion;sitting;10 repetitions;2 sets  -RG     Row Name 07/12/21 1434          Positioning and Restraints    Pre-Treatment Position  in bed  -RG     Post Treatment Position  bed  -RG     In Bed  notified nsg;side lying right;call light within reach;encouraged to call for assist;exit alarm on  -RG       User Key  (r) = Recorded By, (t) = Taken By, (c) = Cosigned By    Initials Name Provider Type    RG Robin Johnson PTA Physical Therapy Assistant           Physical Therapy Education                 Title: PT OT SLP Therapies (In Progress)     Topic: Physical Therapy (Done)     Point: Mobility training (Done)     Learning Progress Summary           Patient Acceptance, E,D, VU,NR by RG at 7/12/2021 1440    Acceptance, E,D, VU,NR by RG at 7/8/2021 1442    Acceptance, E,D, VU,NR by RG at 7/7/2021 1519    Acceptance, E,D, VU,NR by RG at 7/7/2021 1517    Acceptance, E,TB, VU by RF at 7/6/2021 1545    Acceptance, E,TB, VU by RF at 7/5/2021 1613    Acceptance, E, VU,NR by LB at 7/3/2021 1357    Acceptance, E, VU,NR by LB at 7/2/2021 1524                   Point: Home exercise program (Done)     Learning Progress Summary           Patient Acceptance, E,D, VU,NR by RG at 7/12/2021 1440    Acceptance, E,D, VU,NR by RG at 7/8/2021 1442    Acceptance, E,D, VU,NR by RG at 7/7/2021 1519    Acceptance, E,D, VU,NR by RG at 7/7/2021 1517    Acceptance, E,TB, VU by RF at 7/6/2021 1545    Acceptance, E,TB, VU by RF at 7/5/2021 1613    Acceptance, E, VU,NR by LB at 7/3/2021 1357    Acceptance, E, VU,NR by LB at 7/2/2021 1524                   Point: Body mechanics (Done)     Learning Progress Summary           Patient Acceptance, E,D, VU,NR by RG at 7/12/2021 1440    Acceptance, E,D, VU,NR by RG at 7/8/2021 1442    Acceptance, E,D, VU,NR by RG at 7/7/2021 1519    Acceptance, E,D, VU,NR by RG at 7/7/2021 1517    Acceptance, E,TB, VU by RF at 7/6/2021 1545    Acceptance, E,TB, VU by RF at 7/5/2021 1613     Acceptance, E, VU,NR by LB at 7/3/2021 1357    Acceptance, E, VU,NR by LB at 7/2/2021 1524                   Point: Precautions (Done)     Learning Progress Summary           Patient Acceptance, E,D, VU,NR by RG at 7/12/2021 1440    Acceptance, E,D, VU,NR by RG at 7/8/2021 1442    Acceptance, E,D, VU,NR by RG at 7/7/2021 1519    Acceptance, E,D, VU,NR by RG at 7/7/2021 1517    Acceptance, E,TB, VU by RF at 7/6/2021 1545    Acceptance, E,TB, VU by RF at 7/5/2021 1613    Acceptance, E, VU,NR by LB at 7/3/2021 1357    Acceptance, E, VU,NR by LB at 7/2/2021 1524                               User Key     Initials Effective Dates Name Provider Type Discipline     06/16/21 -  Linda Rodríguez, PT Physical Therapist PT     06/16/21 -  Nuris Mccollum PTA Physical Therapy Assistant PT     06/16/21 -  Robin Johnson PTA Physical Therapy Assistant PT                PT Recommendation and Plan    Frequency of Treatment (PT): 5 times per week  Anticipated Equipment Needs at Discharge (PT Eval):  (tbd)  Daily Progress Summary (PT)  Impairments Still Limiting Function (PT): strength decreased, coordination impaired, impaired functional activity tolerance, motor control impaired               Time Calculation:     PT Charges     Row Name 07/12/21 1441 07/12/21 1440          Time Calculation    Start Time  1330  -  1045  -     Stop Time  1415  -RG  1130  -     Time Calculation (min)  45 min  -RG  45 min  -RG     PT Received On  07/12/21  -  07/12/21  -RG        Time Calculation- PT    Total Timed Code Minutes- PT  45 minute(s)  -RG  45 minute(s)  -RG       User Key  (r) = Recorded By, (t) = Taken By, (c) = Cosigned By    Initials Name Provider Type    RG Robin Johnson PTA Physical Therapy Assistant          Therapy Charges for Today     Code Description Service Date Service Provider Modifiers Qty    50672559634 HC PT THERAPEUTIC ACT EA 15 MIN 7/12/2021 Robin Johnson PTA GP 2    11834565134 HC PT THER PROC  EA 15 MIN 7/12/2021 Robin Johnson, PTA GP 2    45546736928 HC GAIT TRAINING EA 15 MIN 7/12/2021 Robin Johnson, AURELIO GP 2                   Robin Johnson, AURELIO  7/12/2021

## 2021-07-12 NOTE — PROGRESS NOTES
Rehabilitation Nursing  Inpatient Rehabilitation Plan of Care Note    Plan of Care  Copy from POC    Pain    Pain Management (Active)  Current Status (7/1/2021 5:54:00 PM): Potential for pain  Weekly Goal: No pain this week  Discharge Goal: No pain    Safety    Potential for Injury (Active)  Current Status (7/1/2021 5:54:00 PM): At risk for injury  Weekly Goal: No injury this week  Discharge Goal: No injury    RN Interventions    Pain -  RN: Assess pain Q shift and PRN..meds per MD order..Call bell in reach [RN]    Safety -  RN: Assess safety Q 1 hour and PRN..Meds per MD order..Call bell and items in  reach..Side railos up Xs2 [RN]  RN: BED ALARM [RN]    Signed by: Nurse Tristan

## 2021-07-12 NOTE — PLAN OF CARE
Goal Outcome Evaluation:  Plan of Care Reviewed With: patient        Progress: improving  Outcome Summary: patient progressing medically and with therapies. continue plan of care.

## 2021-07-12 NOTE — SIGNIFICANT NOTE
07/12/21 1024   Plan   Plan SS informed pt and son Ulysses [phone] about pending SNF placement at [facility] and explained they were submitting updated records and request for PA today.  Explained [facility] will contact SS when they receive a decision from insurance about admission.  Will follow.   Patient/Family in Agreement with Plan yes

## 2021-07-12 NOTE — PROGRESS NOTES
[facility] REHAB PROGRESS NOTE     Patient Identification:  Name:  Chance Lopez  Age:  65 y.o.  Sex:  male  :  1955  MRN:  [id number]  Visit Number:  [id number]  ROOM: [facility]     Primary Care Provider:  Ramos Gurrola MD    Length of stay in inpatient status:  11    Subjective     Chief Compliant:  No chief complaint on file.      History of Presenting Illness: 65-year-old gentleman who was brought in for follow-up of recent CVA involving bihemispheric MCA infarctions, diabetes mellitus, acute kidney injury, hypertension, encephalopathy.  Patient states he is doing very well has no new complaints at this time.  Plan is that patient will likely be going to nursing home for further care.    Objective     Current Hospital Meds:aspirin, 81 mg, Oral, Daily  atorvastatin, 40 mg, Oral, Nightly  carvedilol, 12.5 mg, Oral, BID With Meals  cetirizine, 10 mg, Oral, Daily  clopidogrel, 75 mg, Oral, Daily  enoxaparin, 40 mg, Subcutaneous, Q24H  fluticasone, 2 spray, Each Nare, Daily  insulin aspart, 10 Units, Subcutaneous, TID With Meals  insulin detemir, 15 Units, Subcutaneous, Nightly  multivitamin, 1 tablet, Oral, Daily  OLANZapine, 5 mg, Oral, Nightly  PARoxetine, 20 mg, Oral, Daily  sennosides-docusate, 2 tablet, Oral, BID       ----------------------------------------------------------------------------------------------------------------------  Vital Signs:  Temp:  [98 °F (36.7 °C)-98.7 °F (37.1 °C)] 98 °F (36.7 °C)  Heart Rate:  [77-78] 78  Resp:  [18-20] 18  BP: (121-133)/(76-77) 121/76  SpO2:  [98 %-99 %] 99 %  on   ;   Device (Oxygen Therapy): room air  Body mass index is 24.39 kg/m².    Wt Readings from Last 3 Encounters:   21 79.3 kg (174 lb 13.2 oz)   19 83.6 kg (184 lb 3.2 oz)   18 83.9 kg (184 lb 15.5 oz)     Intake & Output (last 3 days)       701 - 07/10 0700 07/10 0701 -  07 07 -  07 07 -  0700    P.O. 1180 1300 1440      Total Intake(mL/kg) 1180 (14.9) 1300 (16.4) 1440 (18.2)     Urine (mL/kg/hr)  950 (0.5)      Total Output  950      Net +1180 +350 +1440             Urine Unmeasured Occurrence 6 x 3 x 7 x         Diet Soft Texture; Chopped; Thin; Consistent Carbohydrate  ----------------------------------------------------------------------------------------------------------------------  Physical exam:  Constitutional:   No acute distress  HEENT: Normocephalic atraumatic  Neck: Supple   Cardiovascular: Regular rate and rhythm  Pulmonary/Chest: Clear to auscultation  Abdominal:  .  Positive bowel sounds soft  Musculoskeletal: No arthropathy  Neurological: Minimal confusion at times.  Answers all questions appropriately today  Skin: No rash  Peripheral vascular:  Genitourinary:  ----------------------------------------------------------------------------------------------------------------------    Last echocardiogram:    ----------------------------------------------------------------------------------------------------------------------  Results from last 7 days   Lab Units 07/09/21  0153   WBC 10*3/mm3 4.30   HEMOGLOBIN g/dL 11.8*   HEMATOCRIT % 34.7*   MCV fL 97.7*   MCHC g/dL 34.0   PLATELETS 10*3/mm3 221         Results from last 7 days   Lab Units 07/10/21  0228 07/09/21  0153   SODIUM mmol/L  --  139   POTASSIUM mmol/L  --  4.6   MAGNESIUM mg/dL 2.5* 1.9   CHLORIDE mmol/L  --  103   CO2 mmol/L  --  23.5   BUN mg/dL  --  28*   CREATININE mg/dL  --  1.43*   EGFR IF NONAFRICN AM mL/min/1.73  --  50*   CALCIUM mg/dL  --  9.5   GLUCOSE mg/dL  --  100*   ALBUMIN g/dL  --  3.58   BILIRUBIN mg/dL  --  0.3   ALK PHOS U/L  --  100   AST (SGOT) U/L  --  30   ALT (SGPT) U/L  --  34   Estimated Creatinine Clearance: 57.8 mL/min (A) (by C-G formula based on SCr of 1.43 mg/dL (H)).  No results found for: AMMONIA              Glucose   Date/Time Value Ref Range Status   07/12/2021 0615 240 (H) 70 - 130 mg/dL Final     Comment:     Meter:  [id number] : [id number] [place]   07/11/2021 1955 181 (H) 70 - 130 mg/dL Final     Comment:     Meter: [id number] : [id number] Laron Gonzalez Ambreen   07/11/2021 1638 253 (H) 70 - 130 mg/dL Final     Comment:     Meter: [id number] : [id number] Bijan Neris   07/11/2021 1129 333 (H) 70 - 130 mg/dL Final     Comment:     Meter: [id number] : [id number] Kraig Dena   07/11/2021 0626 165 (H) 70 - 130 mg/dL Final     Comment:     Meter: [id number] : [id number] Bobby Lawrence C   07/10/2021 1928 213 (H) 70 - 130 mg/dL Final     Comment:     Meter: [id number] : [id number] STELLA MATHEWSY   07/10/2021 1619 277 (H) 70 - 130 mg/dL Final     Comment:     Meter: [id number] : [id number] Cornell Dena   07/10/2021 1113 313 (H) 70 - 130 mg/dL Final     Comment:     Meter: [id number] : [id number] Bijan Neris     No results found for: TSH, FREET4  No results found for: PREGTESTUR, PREGSERUM, HCG, HCGQUANT  Pain Management Panel    There is no flowsheet data to display.       Brief Urine Lab Results     None        No results found for: BLOODCX      No results found for: URINECX  No results found for: WOUNDCX  No results found for: STOOLCX        I have personally looked at the labs and they are summarized above.  ----------------------------------------------------------------------------------------------------------------------  Detailed radiology reports for the last 24 hours:    Imaging Results (Last 24 Hours)     ** No results found for the last 24 hours. **        Final impressions for the last 30 days of radiology reports:    No radiology results for the last 30 days.  I have personally looked at the radiology images and read the final radiology report.    Assessment & Plan    Status post CVA with bihemispheric MCA infarctions--continue aspirin and statin therapy.  Patient continues work with speech therapy for help with cognition.  Patient ambulated 500 feet with front wheel walker and  contact-guard.  Requiring contact-guard for toileting.  Set up assistance for upper body dressing did work on motor skills last week.    Diabetes mellitus--reasonably well-controlled continue current treatment    Acute kidney injury--has creatinine is been stable    Hypertension continue Coreg 12.5 mg twice daily    History of encephalopathy--has done well with Paxil and Zyprexa.      VTE Prophylaxis:   Mechanical Order History:     None      Pharmalogical Order History:      Ordered     Dose Route Frequency Stop    07/01/21 1253  enoxaparin (LOVENOX) syringe 40 mg      40 mg SC Every 24 Hours --                    Chance Underwood MD  [facility]  07/12/21  09:26 EDT

## 2021-07-12 NOTE — THERAPY TREATMENT NOTE
Inpatient Rehabilitation - Speech Language Pathology Treatment Note  [facility]     Patient Name: Chance Lopez  : 1955  MRN: [id number]  Today's Date: 2021             Admit Date: 2021     Chance Lopez was seen this am in speech therapy office for continued speech/language/cognitive therapy.  He is pleasant and interactive to participate in all tx tasks.        Long Term Goal:  Pt will demonstrate wfl speech language skills in all activities and w/ all communication partners to complete adls.      Short Term Goals:  1. Pt will recall various pictures/items to increase STM in 3/5 opp over 3 consecutive sessions w/ min cues.   * 5 stimuli objects presented w/ requests to recall. He is able to recall 2 w/o cues independently, 2 w/ min-mod cues, 1 w/ mod-max cues.       2. Pt will recall personal LTM information in 3/5 opp over 3 consecutive sessions w/ min cues.   *Mr. Lopez is able to verbally provide his name and  correctly.  He independently provides grandchildren's names and other family member names.      3. Pt will complete following multi-step directives tasks related to adls in 3/5 opp over 3 consecutive sessions w/ min cues.   * No specifically targeted on this date.     4. Pt will perform sequencing tasks related to adls in 3/5 opp over 3 consecutive sessions w/ min cues.   * Not addressed on this date.      5. Pt will perform convergent naming tasks w/ min cues in 3/5 opp over 3 consecutive sessions.   * 2/3 independently. 1/3 w/ max cues.      6. Pt will perform divergent naming tasks w/ min cues in approx 1 min in 3/5 opp over 3 consecutive sessions.   *  2 independently, 2 min cues, 1 max cues over 4 min.      7. Pt will perform graphic tasks related to adls w/ min cues in 3/5 opp over 3 consecutive sessions.   * Pt able to write name independently.      8. Pt will demonstrate topic maintenance across session w/ min cues in 3/5 opp over 3 consecutive sessions.    * Mr. Lopez maintained  topics w/o cues and in appropriate contexts for approximatley 5 minutes each topic. He independently transitions appropriately to new topic.     9. Pt will label items/pictures presented in 3/5 opp to decrease anomia w/ min cues over 3 consecutive sessions.   * Not specifically addressed on this date.      10. Pt will respond to simple/moderate OE questions pertaining to adls w/ moderate cues 3/5 opp over 3 consecutive sessions.   * responds to simple OE questions pertaining to adls w/ mod cues in 2/5 opp.       ----------------------------------------------------------------------------------------------------      DYSPHAGIA THERAPY PLAN OF CARE:     Chance John was seen this am in speech therapy office for formal therapy.      Long Term Goal:  Pt will accept least restrictive diet tolerance w/o overt s/s aspiration.      Short Term Goals:     1. Pt will perform OROM/SHANA exercises x3 sets x10 reps w/ min cues.  * GOAL MET     2. Pt will perform compensatory techniques during meals w/ min cues.  * GOAL MET           Thank you-  Claribel Lovell M.S., CFY-SLP     Visit Dx:  No diagnosis found.  Patient Active Problem List   Diagnosis   • Colon cancer screening   • Gastritis   • CVA (cerebral vascular accident) (CMS/HCC)     Past Medical History:   Diagnosis Date   • Diabetes mellitus (CMS/HCC)    • Diarrhea    • Full dentures     INSTRUCTED NO ADHESIVES THE DOS. REPORTS USUALLY ONLY WEARS UPPER PLATE.   • Hearing loss     REPORTS MORE LOSS ON LEFT SIDE BUT THAT HE HAS BILATERAL LOSS. NO USE OF HEARING AIDS.   • Hepatitis     REPORTS AS A CHILD.  UNSURE IF TYPE A OR B.   • History of acute pancreatitis     REPORTS PRIOR TO 2000   • History of gout    • Hypertension    • Seasonal allergies    • Stroke (CMS/HCC)    • Wears glasses     RX GLASSES     Past Surgical History:   Procedure Laterality Date   • COLONOSCOPY N/A 11/26/2018    Procedure: COLONOSCOPY;  Surgeon: Ha Sykes MD;  Location: [facility] ENDOSCOPY;  Service:  Gastroenterology   • ENDOSCOPY N/A 11/26/2018    Procedure: ESOPHAGOGASTRODUODENOSCOPY with cold forcep biopsy;  Surgeon: Ha Sykes MD;  Location: [facility] ENDOSCOPY;  Service: Gastroenterology   • TOOTH EXTRACTION        EDUCATION  The patient has been educated in the following areas:     Cognitive Impairment Communication Impairment.    Impression: Mr. Lopez continues to make progress towards his goals.     SLP Recommendation and Plan   Continue current POC to allow for maximal opportunities for improvement.     Thank you   Claribel Lovell M.S., CFY-SLP                   Time Calculation:     Time Calculation- SLP     Row Name 07/12/21 1428 07/12/21 1427          Time Calculation- SLP    SLP Start Time  1245  -JR  0915  -JR     SLP Stop Time  1330  -JR  1000  -JR     SLP Time Calculation (min)  45 min  -JR  45 min  -JR     SLP - Next Appointment  --  07/13/21  -       User Key  (r) = Recorded By, (t) = Taken By, (c) = Cosigned By    Initials Name Provider Type    Claribel Correa MS, CFY-SLP Speech and Language Pathologist          Therapy Charges for Today     Code Description Service Date Service Provider Modifiers Qty    44939305544 HC ST TREATMENT SPEECH 3 7/12/2021 Claribel Lovell MS, CFY-SLP GN 1    30497771549 HC ST TREATMENT SPEECH 3 7/12/2021 Claribel Lovell MS, CFY-SLP GN 1                     Claribel Lovell MS, CFGAURAV-SLP  7/12/2021

## 2021-07-12 NOTE — PROGRESS NOTES
Inpatient Rehabilitation Functional Measures Assessment and Plan of Care    Plan of Care  Self Care    [OT] Toileting(Active)  Current Status(07/12/2021): cga/sup  Weekly Goal(07/20/2021): sup  Discharge Goal: sup    Functional Measures  RENETTA Eating:  RENETTA Grooming:  RENETTA Bathing:  RENETTA Upper Body Dressing:  RENETTA Lower Body Dressing:  RENETTA Toileting:    RENETTA Bladder Management  Level of Assistance:  Frequency/Number of Accidents this Shift:    RENETTA Bowel Management  Level of Assistance:  Frequency/Number of Accidents this Shift:    RENETTA Bed/Chair/Wheelchair Transfer:  RENETTA Toilet Transfer:  RENETTA Tub/Shower Transfer:    Previously Documented Mode of Locomotion at Discharge:  [place] Expected Mode of Locomotion at Discharge:  RENETTA Walk/Wheelchair:  RENETTA Stairs:    RENETTA Comprehension:  RENETTA Expression:  [place] Social Interaction:  [place] Problem Solving:  RENETTA Memory:    Therapy Mode Minutes  Occupational Therapy: Individual: 90 minutes.  Physical Therapy:  Speech Language Pathology:    Discharge Functional Goals:    Signed by: Gabi Marinelli, Occupational Therapist

## 2021-07-12 NOTE — PROGRESS NOTES
Inpatient Rehabilitation Plan of Care Note    Plan of Care  Mobility    [PT] Bed/Chair/Wheelchair(Active)  Current Status(07/12/2021): CGA  Weekly Goal(07/19/2021): Sup  Discharge Goal: Sup    [PT] Walk(Active)  Current Status(07/12/2021): amb 320' RW CGA  Weekly Goal(07/19/2021): amb 300' RW Sup  Discharge Goal: amb 300' RW Sup    Signed by: Linda Rodríguez, Physical Therapist

## 2021-07-12 NOTE — THERAPY TREATMENT NOTE
Inpatient Rehabilitation - Speech Language Pathology Treatment Note  [facility]     Patient Name: Chance Lopez  : 1955  MRN: [id number]  Today's Date: 2021             Admit Date: 2021     Chance Lopez was seen this pm in patient room for continued speech/language/cognitive therapy.  He is pleasant and interactive to participate in all tx tasks.        Long Term Goal:  Pt will demonstrate wfl speech language skills in all activities and w/ all communication partners to complete adls.      Short Term Goals:  1. Pt will recall various pictures/items to increase STM in 3/5 opp over 3 consecutive sessions w/ min cues.   * Pt able to recall 3/5 pictures independently.  Able to recall 2/5 w/ min cues.       2. Pt will recall personal LTM information in 3/5 opp over 3 consecutive sessions w/ min cues.   *Able to recall , family member names.         3. Pt will complete following multi-step directives tasks related to adls in 3/5 opp over 3 consecutive sessions w/ min cues.   * 3 step directives related to adls in 2/5 opp w/ min-mod cues.      4. Pt will perform sequencing tasks related to adls in 3/5 opp over 3 consecutive sessions w/ min cues.   * sequence pictures of getting dressed.  Pt able to sequence 5 pictures w/ mod-max cues.      5. Pt will perform convergent naming tasks w/ min cues in 3/5 opp over 3 consecutive sessions.   * Not specifically addressed on this date.     6. Pt will perform divergent naming tasks w/ min cues in approx 1 min in 3/5 opp over 3 consecutive sessions.   * Not specifically addressed on this date.     7. Pt will perform graphic tasks related to adls w/ min cues in 3/5 opp over 3 consecutive sessions.   * Not directly addressed     8. Pt will demonstrate topic maintenance across session w/ min cues in 3/5 opp over 3 consecutive sessions.    * Mr. Lopez maintained topics relating to previous activities throughout the day.     9. Pt will label items/pictures presented in 3/5  opp to decrease anomia w/ min cues over 3 consecutive sessions.   * 5/5 pictures of common tasks w/ min cues.      10. Pt will respond to simple/moderate OE questions pertaining to adls w/ moderate cues 3/5 opp over 3 consecutive sessions.   * responds to simple OE questions pertaining to adls w/ mod cues in 2/5 opp.       ----------------------------------------------------------------------------------------------------      DYSPHAGIA THERAPY PLAN OF CARE:     Chance Lopez was seen this am in speech therapy office for formal therapy.      Long Term Goal:  Pt will accept least restrictive diet tolerance w/o overt s/s aspiration.      Short Term Goals:     1. Pt will perform OROM/SHANA exercises x3 sets x10 reps w/ min cues.  * GOAL MET.     2. Pt will perform compensatory techniques during meals w/ min cues.  * GOAL MET           Thank you-  Claribel Lovell M.S., CFY-SLP     Visit Dx:  No diagnosis found.  Patient Active Problem List   Diagnosis   • Colon cancer screening   • Gastritis   • CVA (cerebral vascular accident) (CMS/HCC)     Past Medical History:   Diagnosis Date   • Diabetes mellitus (CMS/HCC)    • Diarrhea    • Full dentures     INSTRUCTED NO ADHESIVES THE DOS. REPORTS USUALLY ONLY WEARS UPPER PLATE.   • Hearing loss     REPORTS MORE LOSS ON LEFT SIDE BUT THAT HE HAS BILATERAL LOSS. NO USE OF HEARING AIDS.   • Hepatitis     REPORTS AS A CHILD.  UNSURE IF TYPE A OR B.   • History of acute pancreatitis     REPORTS PRIOR TO 2000   • History of gout    • Hypertension    • Seasonal allergies    • Stroke (CMS/HCC)    • Wears glasses     RX GLASSES     Past Surgical History:   Procedure Laterality Date   • COLONOSCOPY N/A 11/26/2018    Procedure: COLONOSCOPY;  Surgeon: Ha Sykes MD;  Location: [facility] ENDOSCOPY;  Service: Gastroenterology   • ENDOSCOPY N/A 11/26/2018    Procedure: ESOPHAGOGASTRODUODENOSCOPY with cold forcep biopsy;  Surgeon: Ha Sykes MD;  Location: [facility] ENDOSCOPY;  Service:  Gastroenterology   • TOOTH EXTRACTION        EDUCATION  The patient has been educated in the following areas:     Cognitive Impairment Communication Impairment.    Impression: Mr. Lopez continues to make progress towards his goals.     SLP Recommendation and Plan   Continue current POC to allow for maximal opportunities for improvement.     Thank you   Claribel Lovell M.S., CFY-SLP                   Time Calculation:     Time Calculation- SLP     Row Name 07/12/21 1428 07/12/21 1427          Time Calculation- SLP    SLP Start Time  1245  -JR  0915  -JR     SLP Stop Time  1330  -JR  1000  -     SLP Time Calculation (min)  45 min  -JR  45 min  -     SLP - Next Appointment  --  07/13/21  -       User Key  (r) = Recorded By, (t) = Taken By, (c) = Cosigned By    Initials Name Provider Type    Claribel Correa MS, CFY-SLP Speech and Language Pathologist          Therapy Charges for Today     Code Description Service Date Service Provider Modifiers Qty    97404825201  ST TREATMENT SPEECH 3 7/12/2021 Claribel Lovell MS, CFGAURAV-SLP GN 1    [id number]  ST TREATMENT SPEECH 3 7/12/2021 Claribel Lovell MS, CFY-SLP GN 1                     Claribel Lovell MS, KITTY-SLP  7/12/2021

## 2021-07-12 NOTE — PROGRESS NOTES
Inpatient Rehabilitation Functional Measures Assessment and Plan of Care    Plan of Care  Communication    [ST] Expression(Active)  Current Status(07/13/2021): Deficits noted w/ speech language  Weekly Goal(07/19/2021): STM memory tasks  Discharge Goal: WFL communication    Functional Measures  RENETTA Eating:  RENETTA Grooming:  RENETTA Bathing:  RENETTA Upper Body Dressing:  RENETTA Lower Body Dressing:  RENETTA Toileting:    RENETTA Bladder Management  Level of Assistance:  Frequency/Number of Accidents this Shift:    RENETTA Bowel Management  Level of Assistance:  Frequency/Number of Accidents this Shift:    RENETTA Bed/Chair/Wheelchair Transfer:  RENETTA Toilet Transfer:  RENETTA Tub/Shower Transfer:    Previously Documented Mode of Locomotion at Discharge:  [facility] Expected Mode of Locomotion at Discharge:  RENETTA Walk/Wheelchair:  [facility] Stairs:    [facility] Comprehension:  RENETTA Expression:  [facility] Social Interaction:  [facility] Problem Solving:  RENETTA Memory:    Therapy Mode Minutes  Occupational Therapy:  Physical Therapy:  Speech Language Pathology: Individual: 90 minutes.    Discharge Functional Goals:    Signed by: Claribel Lovell SLP

## 2021-07-12 NOTE — SIGNIFICANT NOTE
07/12/21 0855   Plan   Plan Contacted [facility] and [facility] [phone] per Therese to follow-up with status of PA for SNF admission.  Therese says to fax updated records to submit to insurance for PA today.  Faxed MD progress note, PT/OT/SLP notes and initial evaluations, medication list, active orders, and labs to [phone].  NH to follow-up with SS regarding insurance decision.

## 2021-07-13 LAB
GLUCOSE BLDC GLUCOMTR-MCNC: 146 MG/DL (ref 70–130)
GLUCOSE BLDC GLUCOMTR-MCNC: 148 MG/DL (ref 70–130)
GLUCOSE BLDC GLUCOMTR-MCNC: 195 MG/DL (ref 70–130)
GLUCOSE BLDC GLUCOMTR-MCNC: 276 MG/DL (ref 70–130)

## 2021-07-13 PROCEDURE — 82962 GLUCOSE BLOOD TEST: CPT

## 2021-07-13 PROCEDURE — 63710000001 INSULIN ASPART PER 5 UNITS: Performed by: FAMILY MEDICINE

## 2021-07-13 PROCEDURE — 25010000002 ENOXAPARIN PER 10 MG: Performed by: FAMILY MEDICINE

## 2021-07-13 PROCEDURE — 63710000001 INSULIN DETEMIR PER 5 UNITS: Performed by: INTERNAL MEDICINE

## 2021-07-13 PROCEDURE — 97535 SELF CARE MNGMENT TRAINING: CPT | Performed by: OCCUPATIONAL THERAPIST

## 2021-07-13 PROCEDURE — 99231 SBSQ HOSP IP/OBS SF/LOW 25: CPT | Performed by: FAMILY MEDICINE

## 2021-07-13 PROCEDURE — 97530 THERAPEUTIC ACTIVITIES: CPT | Performed by: OCCUPATIONAL THERAPIST

## 2021-07-13 PROCEDURE — 97530 THERAPEUTIC ACTIVITIES: CPT

## 2021-07-13 PROCEDURE — 97110 THERAPEUTIC EXERCISES: CPT

## 2021-07-13 PROCEDURE — 97112 NEUROMUSCULAR REEDUCATION: CPT | Performed by: OCCUPATIONAL THERAPIST

## 2021-07-13 PROCEDURE — 97116 GAIT TRAINING THERAPY: CPT

## 2021-07-13 PROCEDURE — 92507 TX SP LANG VOICE COMM INDIV: CPT

## 2021-07-13 RX ADMIN — CARVEDILOL 12.5 MG: 6.25 TABLET, FILM COATED ORAL at 17:26

## 2021-07-13 RX ADMIN — INSULIN DETEMIR 15 UNITS: 100 INJECTION, SOLUTION SUBCUTANEOUS at 21:15

## 2021-07-13 RX ADMIN — DOCUSATE SODIUM 50 MG AND SENNOSIDES 8.6 MG 2 TABLET: 8.6; 5 TABLET, FILM COATED ORAL at 08:42

## 2021-07-13 RX ADMIN — Medication 1 TABLET: at 08:42

## 2021-07-13 RX ADMIN — DOCUSATE SODIUM 50 MG AND SENNOSIDES 8.6 MG 2 TABLET: 8.6; 5 TABLET, FILM COATED ORAL at 20:39

## 2021-07-13 RX ADMIN — ENOXAPARIN SODIUM 40 MG: 40 INJECTION SUBCUTANEOUS at 20:38

## 2021-07-13 RX ADMIN — PAROXETINE HYDROCHLORIDE 20 MG: 20 TABLET, FILM COATED ORAL at 08:42

## 2021-07-13 RX ADMIN — CARVEDILOL 12.5 MG: 6.25 TABLET, FILM COATED ORAL at 08:42

## 2021-07-13 RX ADMIN — ATORVASTATIN CALCIUM 40 MG: 40 TABLET, FILM COATED ORAL at 20:39

## 2021-07-13 RX ADMIN — CLOPIDOGREL 75 MG: 75 TABLET, FILM COATED ORAL at 08:42

## 2021-07-13 RX ADMIN — CETIRIZINE HYDROCHLORIDE 10 MG: 10 TABLET, FILM COATED ORAL at 08:42

## 2021-07-13 RX ADMIN — INSULIN ASPART 10 UNITS: 100 INJECTION, SOLUTION INTRAVENOUS; SUBCUTANEOUS at 17:25

## 2021-07-13 RX ADMIN — FLUTICASONE PROPIONATE 2 SPRAY: 50 SPRAY, METERED NASAL at 08:42

## 2021-07-13 RX ADMIN — OLANZAPINE 5 MG: 5 TABLET, FILM COATED ORAL at 20:39

## 2021-07-13 RX ADMIN — INSULIN ASPART 10 UNITS: 100 INJECTION, SOLUTION INTRAVENOUS; SUBCUTANEOUS at 11:31

## 2021-07-13 RX ADMIN — ASPIRIN 81 MG: 81 TABLET, COATED ORAL at 08:42

## 2021-07-13 RX ADMIN — INSULIN ASPART 10 UNITS: 100 INJECTION, SOLUTION INTRAVENOUS; SUBCUTANEOUS at 07:16

## 2021-07-13 NOTE — SIGNIFICANT NOTE
07/13/21 1507   Plan   Plan Informed pt about [facility] awaiting decision from insurance regarding admission.  Discussed plans to go to [facility] if approved by insurance for continued PT, OT, and SLP.  Pt is agreeable to plans.  Will follow.   Patient/Family in Agreement with Plan yes

## 2021-07-13 NOTE — THERAPY TREATMENT NOTE
Inpatient Rehabilitation - Physical Therapy Treatment Note        [facility]     Patient Name: Chance Lopez  : 1955  MRN: [id number]    Today's Date: 2021                    Admit Date: 2021      Visit Dx: No diagnosis found.    Patient Active Problem List   Diagnosis   • Colon cancer screening   • Gastritis   • CVA (cerebral vascular accident) (CMS/HCC)       Past Medical History:   Diagnosis Date   • Diabetes mellitus (CMS/HCC)    • Diarrhea    • Full dentures     INSTRUCTED NO ADHESIVES THE DOS. REPORTS USUALLY ONLY WEARS UPPER PLATE.   • Hearing loss     REPORTS MORE LOSS ON LEFT SIDE BUT THAT HE HAS BILATERAL LOSS. NO USE OF HEARING AIDS.   • Hepatitis     REPORTS AS A CHILD.  UNSURE IF TYPE A OR B.   • History of acute pancreatitis     REPORTS PRIOR TO    • History of gout    • Hypertension    • Seasonal allergies    • Stroke (CMS/HCC)    • Wears glasses     RX GLASSES       Past Surgical History:   Procedure Laterality Date   • COLONOSCOPY N/A 2018    Procedure: COLONOSCOPY;  Surgeon: Ha Sykes MD;  Location: [facility] ENDOSCOPY;  Service: Gastroenterology   • ENDOSCOPY N/A 2018    Procedure: ESOPHAGOGASTRODUODENOSCOPY with cold forcep biopsy;  Surgeon: Ha Sykes MD;  Location: [facility] ENDOSCOPY;  Service: Gastroenterology   • TOOTH EXTRACTION            PT ASSESSMENT (last 12 hours)      IRF PT Evaluation and Treatment     Row Name 21 1424          PT Time and Intention    Document Type  daily treatment  -RG     Mode of Treatment  individual therapy;physical therapy  -RG     Patient/Family/Caregiver Comments/Observations  Pt completed balance, streingth, and gait activities with cues for task completion and while ambulating around obstacles in hallway.   -RG     Row Name 21 1424          Transfers    Bed-Chair [place] (Transfers)  contact guard  -     Assistive Device (Bed-Chair Transfers)  wheelchair  -RG     Sit-Stand [place] (Transfers)   standby assist;contact guard  -RG     Stand-Sit [place] (Transfers)  verbal cues;standby assist;contact guard  -RG     [place] Level (Toilet Transfer)  contact guard  -RG     Row Name 07/13/21 1424          Sit-Stand Transfer    Assistive Device (Sit-Stand Transfers)  walker, front-wheeled  -RG     Row Name 07/13/21 1424          Stand-Sit Transfer    Assistive Device (Stand-Sit Transfers)  walker, front-wheeled  -RG     Row Name 07/13/21 1424          Toilet Transfer    Type (Toilet Transfer)  stand pivot/stand step  -RG     Row Name 07/13/21 1424          Car Transfer    Type (Car Transfer)  stand pivot/stand step  -RG     [place] Level (Car Transfer)  contact guard  -RG     Assistive Device (Car Transfer)  walker, front-wheeled  -RG     Row Name 07/13/21 1424          Gait/Stairs (Locomotion)    [place] Level (Gait)  contact guard  -RG     Distance in Feet (Gait)  320 am ans 3200 pm with no AD  -RG     Comment (Gait/Stairs)  No AD today, some lob noted.   -RG     Row Name 07/13/21 1424          Balance    Balance Assessment  stepping strategy assessment  -RG     Row Name 07/13/21 1424          Stepping Strategy (Balance)    Stepping Strategy Assessment (Balance)  stepping over cones, weaving in/out of cones, picking up cones   -     Stepping Strategy Promotion (Balance)  stepping over obstacles  -     Row Name 07/13/21 1424          Hip (Therapeutic Exercise)    Hip Strengthening (Therapeutic Exercise)  bilateral;flexion;aBduction;aDduction;marching while seated;sitting;2 lb free weight;resistance band;green;10 repetitions;2 sets  -RG     Row Name 07/13/21 1424          Knee (Therapeutic Exercise)    Knee Strengthening (Therapeutic Exercise)  bilateral;flexion;extension;marching while seated;LAQ (long arc quad);sitting;2 lb free weight;resistance band;green;10 repetitions;2 sets  -RG     Row Name 07/13/21 1424          Ankle (Therapeutic Exercise)    Ankle Strengthening (Therapeutic  Exercise)  bilateral;dorsiflexion;plantarflexion;sitting;2 lb free weight;resistance band;green;10 repetitions;2 sets  -RG     Row Name 07/13/21 1424          Aerobic Exercise    Type (Aerobic Exercise)  recumbent stationary bike  -RG     Time Performed (Aerobic Exercise)  20  -RG     Comment, Aerobic Exercise (Therapeutic Exercise)  L 1  -RG     Row Name 07/13/21 1424          Positioning and Restraints    Pre-Treatment Position  in bed  -RG     Post Treatment Position  bed  -RG     In Bed  notified nsg;supine;call light within reach;encouraged to call for assist  -RG       User Key  (r) = Recorded By, (t) = Taken By, (c) = Cosigned By    Initials Name Provider Type    RG Robin Johnson PTA Physical Therapy Assistant           Physical Therapy Education                 Title: PT OT SLP Therapies (Done)     Topic: Physical Therapy (Done)     Point: Mobility training (Done)     Learning Progress Summary           Patient Acceptance, E,D, VU,NR by RG at 7/13/2021 1431    Acceptance, E,TB, VU by RE at 7/12/2021 2205    Acceptance, E,D, VU,NR by RG at 7/12/2021 1440    Acceptance, E,D, VU,NR by RG at 7/8/2021 1442    Acceptance, E,D, VU,NR by RG at 7/7/2021 1519    Acceptance, E,D, VU,NR by RG at 7/7/2021 1517    Acceptance, E,TB, VU by RF at 7/6/2021 1545    Acceptance, E,TB, VU by RF at 7/5/2021 1613    Acceptance, E, VU,NR by LB at 7/3/2021 1357    Acceptance, E, VU,NR by LB at 7/2/2021 1524                   Point: Home exercise program (Done)     Learning Progress Summary           Patient Acceptance, E,D, VU,NR by RG at 7/13/2021 1431    Acceptance, E,TB, VU by DG at 7/12/2021 2205    Acceptance, E,D, VU,NR by RG at 7/12/2021 1440    Acceptance, E,D, VU,NR by RG at 7/8/2021 1442    Acceptance, E,D, VU,NR by RG at 7/7/2021 1519    Acceptance, E,D, VU,NR by RG at 7/7/2021 1517    Acceptance, E,TB, VU by RF at 7/6/2021 1545    Acceptance, E,TB, VU by RF at 7/5/2021 1613    Acceptance, E, VU,NR by LB at 7/3/2021 1357     Acceptance, E, VU,NR by LB at 7/2/2021 1524                   Point: Body mechanics (Done)     Learning Progress Summary           Patient Acceptance, E,D, VU,NR by RG at 7/13/2021 1431    Acceptance, E,TB, VU by DG at 7/12/2021 2205    Acceptance, E,D, VU,NR by RG at 7/12/2021 1440    Acceptance, E,D, VU,NR by RG at 7/8/2021 1442    Acceptance, E,D, VU,NR by RG at 7/7/2021 1519    Acceptance, E,D, VU,NR by RG at 7/7/2021 1517    Acceptance, E,TB, VU by RF at 7/6/2021 1545    Acceptance, E,TB, VU by RF at 7/5/2021 1613    Acceptance, E, VU,NR by LB at 7/3/2021 1357    Acceptance, E, VU,NR by LB at 7/2/2021 1524                   Point: Precautions (Done)     Learning Progress Summary           Patient Acceptance, E,D, VU,NR by RG at 7/13/2021 1431    Acceptance, E,TB, VU by DG at 7/12/2021 2205    Acceptance, E,D, VU,NR by RG at 7/12/2021 1440    Acceptance, E,D, VU,NR by RG at 7/8/2021 1442    Acceptance, E,D, VU,NR by RG at 7/7/2021 1519    Acceptance, E,D, VU,NR by RG at 7/7/2021 1517    Acceptance, E,TB, VU by RF at 7/6/2021 1545    Acceptance, E,TB, VU by RF at 7/5/2021 1613    Acceptance, E, VU,NR by LB at 7/3/2021 1357    Acceptance, E, VU,NR by LB at 7/2/2021 1524                               User Key     Initials Effective Dates Name Provider Type Discipline    DG 06/16/21 -  Ramonita Duke, RN Registered Nurse Nurse    LB 06/16/21 -  Linda Rodríguez, PT Physical Therapist PT    RF 06/16/21 -  Nuris Mccollum PTA Physical Therapy Assistant PT    RG 06/16/21 -  Robin Johnson, AURELIO Physical Therapy Assistant PT                PT Recommendation and Plan    Frequency of Treatment (PT): 5 times per week  Anticipated Equipment Needs at Discharge (PT Eval):  (tbd)  Daily Progress Summary (PT)  Impairments Still Limiting Function (PT): strength decreased, coordination impaired, impaired functional activity tolerance, motor control impaired               Time Calculation:     PT Charges     Row Name  07/13/21 1432 07/13/21 1431          Time Calculation    Start Time  1330  -RG  1045  -RG     Stop Time  1415  -RG  1130  -RG     Time Calculation (min)  45 min  -RG  45 min  -RG     PT Received On  07/13/21  -RG  07/13/21  -RG        Time Calculation- PT    Total Timed Code Minutes- PT  45 minute(s)  -RG  45 minute(s)  -RG       User Key  (r) = Recorded By, (t) = Taken By, (c) = Cosigned By    Initials Name Provider Type    Robin Pinon PTA Physical Therapy Assistant          Therapy Charges for Today     Code Description Service Date Service Provider Modifiers Qty    38919165160 HC PT THERAPEUTIC ACT EA 15 MIN 7/12/2021 Robin Johnson, AURELIO GP 2    81678332925 HC PT THER PROC EA 15 MIN 7/12/2021 Robin Johnson, PTA GP 2    84241837296 HC GAIT TRAINING EA 15 MIN 7/12/2021 Robin Johnson, AURELIO GP 2    44469745596 HC GAIT TRAINING EA 15 MIN 7/13/2021 Robin Johnson, AURELIO GP 2    59678510443 HC PT THERAPEUTIC ACT EA 15 MIN 7/13/2021 Robin Johnson, AURELIO GP 2    11908438947 HC PT THER PROC EA 15 MIN 7/13/2021 Robin Johnson PTA GP 2                   Robin Johnson PTA  7/13/2021

## 2021-07-13 NOTE — SIGNIFICANT NOTE
07/13/21 1200   Plan   Plan SS contacted son Ulysses [phone] about [facility] awaiting decision from insurance about admission.  Reviewed how pt is doing in therapy and need for 24 hour supervision related to cognition/memory.  Son only wants pt to go to SNF for short-term rehab until they can make arrangements for pt to receive assistance during the day while family work.  Family want pt to go to [facility] after discharge from SNF and they hope pt will qualify for Attendant Care program through Medicaid so an aide can stay with him for 8 hours during day.  Son has talked to [facility] about assistance.  Son states having emergency guardianship of pt.  Will follow.   Patient/Family in Agreement with Plan yes

## 2021-07-13 NOTE — PROGRESS NOTES
Occupational Therapy:    Physical Therapy: Individual: 45 minutes.    Speech Language Pathology:    Signed by: Robin Johnson PTA

## 2021-07-13 NOTE — PLAN OF CARE
Goal Outcome Evaluation:            Progressing medically and physically with therapies. Continue current plan of care.

## 2021-07-13 NOTE — SIGNIFICANT NOTE
07/13/21 1035   Plan   Plan Team conference held today.  Pt is pending SNF placement.  [phone] per KAYLENE Saleh, who says they on still waiting on insurance decision.  SS to be contacted when insurance makes their decision about admission to SNF.

## 2021-07-13 NOTE — PROGRESS NOTES
[facility] REHAB PROGRESS NOTE     Patient Identification:  Name:  Chance Lopez  Age:  65 y.o.  Sex:  male  :  1955  MRN:  [id number]  Visit Number:  [id number]  ROOM: [facility]     Primary Care Provider:  Ramos Gurrola MD    Length of stay in inpatient status:  12    Subjective     Chief Compliant:  No chief complaint on file.      History of Presenting Illness: 65-year-old gentleman for follow-up of CVA involving bihemispheric MCA distributions, diabetes mellitus, acute kidney injury, hypertension, encephalopathy, likely dementia.  Patient is doing well from the standpoint that he is able to improve his ambulation and do task.  However, patient has been markedly deficient short-term memory and has to be instructed in how to perform simple tasks.  Patient lives by himself typically and with his cognitive issues do not think that he is safe to go home at this time by himself    Objective     Current Hospital Meds:aspirin, 81 mg, Oral, Daily  atorvastatin, 40 mg, Oral, Nightly  carvedilol, 12.5 mg, Oral, BID With Meals  cetirizine, 10 mg, Oral, Daily  clopidogrel, 75 mg, Oral, Daily  enoxaparin, 40 mg, Subcutaneous, Q24H  fluticasone, 2 spray, Each Nare, Daily  insulin aspart, 10 Units, Subcutaneous, TID With Meals  insulin detemir, 15 Units, Subcutaneous, Nightly  multivitamin, 1 tablet, Oral, Daily  OLANZapine, 5 mg, Oral, Nightly  PARoxetine, 20 mg, Oral, Daily  sennosides-docusate, 2 tablet, Oral, BID       ----------------------------------------------------------------------------------------------------------------------  Vital Signs:  Temp:  [97.8 °F (36.6 °C)-98.1 °F (36.7 °C)] 98.1 °F (36.7 °C)  Heart Rate:  [65-75] 65  Resp:  [18-20] 18  BP: (126-148)/(69-74) 148/74  SpO2:  [98 %] 98 %  on   ;   Device (Oxygen Therapy): room air  Body mass index is 24.39 kg/m².    Wt Readings from Last 3 Encounters:   21 79.3 kg (174 lb 13.2 oz)   19 83.6 kg (184 lb 3.2 oz)   18  83.9 kg (184 lb 15.5 oz)     Intake & Output (last 3 days)       07/10 0701 - 07/11 0700 07/11 0701 - 07/12 0700 07/12 0701 - 07/13 0700 07/13 0701 - 07/14 0700    P.O. 1300 1440 1680 600    Total Intake(mL/kg) 1300 (16.4) 1440 (18.2) 1680 (21.2) 600 (7.6)    Urine (mL/kg/hr) 950 (0.5)       Total Output 950       Net +350 +1440 +1680 +600            Urine Unmeasured Occurrence 3 x 7 x 307 x 1 x    Stool Unmeasured Occurrence   1 x         Diet Regular; Thin; Consistent Carbohydrate  ----------------------------------------------------------------------------------------------------------------------  Physical exam:  Constitutional:   No acute distress  HEENT: Normocephalic atraumatic  Neck: Supple   Cardiovascular: Regular rate and rhythm  Pulmonary/Chest: Clear to auscultation  Abdominal: Positive bowel sounds soft.   Musculoskeletal: No arthropathy  Neurological: Patient has no focal deficits at this time but does have marked deficiency and short-term memory.  Skin: No rash  Peripheral vascular:  Genitourinary:  ----------------------------------------------------------------------------------------------------------------------    Last echocardiogram:    ----------------------------------------------------------------------------------------------------------------------  Results from last 7 days   Lab Units 07/09/21  0153   WBC 10*3/mm3 4.30   HEMOGLOBIN g/dL 11.8*   HEMATOCRIT % 34.7*   MCV fL 97.7*   MCHC g/dL 34.0   PLATELETS 10*3/mm3 221         Results from last 7 days   Lab Units 07/10/21  0228 07/09/21  0153   SODIUM mmol/L  --  139   POTASSIUM mmol/L  --  4.6   MAGNESIUM mg/dL 2.5* 1.9   CHLORIDE mmol/L  --  103   CO2 mmol/L  --  23.5   BUN mg/dL  --  28*   CREATININE mg/dL  --  1.43*   EGFR IF NONAFRICN AM mL/min/1.73  --  50*   CALCIUM mg/dL  --  9.5   GLUCOSE mg/dL  --  100*   ALBUMIN g/dL  --  3.58   BILIRUBIN mg/dL  --  0.3   ALK PHOS U/L  --  100   AST (SGOT) U/L  --  30   ALT (SGPT) U/L  --  34    Estimated Creatinine Clearance: 57.8 mL/min (A) (by C-G formula based on SCr of 1.43 mg/dL (H)).  No results found for: AMMONIA              Glucose   Date/Time Value Ref Range Status   07/13/2021 0615 195 (H) 70 - 130 mg/dL Final     Comment:     Meter: [id number] : [id number] Clio Crystal   07/12/2021 2000 200 (H) 70 - 130 mg/dL Final     Comment:     Meter: [id number] : [id number] Larry Lisa Ambreen   07/12/2021 1616 207 (H) 70 - 130 mg/dL Final     Comment:     Meter: [id number] : [id number] Thakkar Neris   07/12/2021 1109 314 (H) 70 - 130 mg/dL Final     Comment:     Meter: [id number] : [id number] Thakkar Neris   07/12/2021 0615 240 (H) 70 - 130 mg/dL Final     Comment:     Meter: [id number] : [id number] Clio Crystal   07/11/2021 1955 181 (H) 70 - 130 mg/dL Final     Comment:     Meter: [id number] : [id number] Larry Lisa Ambreen   07/11/2021 1638 253 (H) 70 - 130 mg/dL Final     Comment:     Meter: [id number] : [id number] Thakkar Neris   07/11/2021 1129 333 (H) 70 - 130 mg/dL Final     Comment:     Meter: [id number] : [id number] Kraig Fernandes     No results found for: TSH, FREET4  No results found for: PREGTESTUR, PREGSERUM, HCG, HCGQUANT  Pain Management Panel    There is no flowsheet data to display.       Brief Urine Lab Results     None        No results found for: BLOODCX      No results found for: URINECX  No results found for: WOUNDCX  No results found for: STOOLCX        I have personally looked at the labs and they are summarized above.  ----------------------------------------------------------------------------------------------------------------------  Detailed radiology reports for the last 24 hours:    Imaging Results (Last 24 Hours)     ** No results found for the last 24 hours. **        Final impressions for the last 30 days of radiology reports:    No radiology results for the last 30 days.  I have personally looked at the radiology images and read the final  radiology report.    Assessment & Plan    Status post CVA with bihemispheric MCA distribution infarctions--continue aspirin and statin therapy.  Patient is working with speech therapy to try to help with cognition.  Patient is ambulating well and improving with his ADLs.  Patient because of his memory issues would likely benefit from continued rehab therapy in the nursing home setting versus nursing home care.    Diabetes mellitus--continue current treatment.  Reasonably well controlled    Acute kidney injury--creatinine has been stable    Hypertension--controlled continue Coreg 12.5 mg twice daily    History of encephalopathy--continue Paxil and Zyprexa.    VTE Prophylaxis:   Mechanical Order History:     None      Pharmalogical Order History:      Ordered     Dose Route Frequency Stop    07/01/21 5093  enoxaparin (LOVENOX) syringe 40 mg      40 mg SC Every 24 Hours --                    Chance Underwood MD  [facility]  07/13/21  10:08 EDT

## 2021-07-13 NOTE — THERAPY TREATMENT NOTE
Inpatient Rehabilitation - Speech Language Pathology Treatment Note  [facility]     Patient Name: Chance Lopez  : 1955  MRN: [id number]  Today's Date: 2021             Admit Date: 2021     Chance Lopez was seen this pm in speech therapy office for continued speech/language/cognitive therapy.  He is pleasant and interactive to participate in all tx tasks.        Long Term Goal:  Pt will demonstrate wfl speech language skills in all activities and w/ all communication partners to complete adls.      Short Term Goals:  1. Pt will recall various pictures/items to increase STM in 3/5 opp over 3 consecutive sessions w/ min cues.   * Pt able to recall 3/5 pictures independently, recall 2/5 w/ min cues initially.  Revisited, pt able to recall 4/5 pictures independently, 1/5 w/ mod cues.        2. Pt will recall personal LTM information in 3/5 opp over 3 consecutive sessions w/ min cues.   * Not specifically addressed on this date.          3. Pt will complete following multi-step directives tasks related to adls in 3/5 opp over 3 consecutive sessions w/ min cues.   * 3 step directives related to adls in 2/5 opp w/ min-mod cues.      4. Pt will perform sequencing tasks related to adls in 3/5 opp over 3 consecutive sessions w/ min cues.   * 4-step adls (brushing teeth) w/ mod-max cues.  Pt able to sequence initial step independently.     5. Pt will perform convergent naming tasks w/ min cues in 3/5 opp over 3 consecutive sessions.   * Not specifically addressed on this date.     6. Pt will perform divergent naming tasks w/ min cues in approx 1 min in 3/5 opp over 3 consecutive sessions.   * Not specifically addressed on this date.     7. Pt will perform graphic tasks related to adls w/ min cues in 3/5 opp over 3 consecutive sessions.   * Not directly addressed     8. Pt will demonstrate topic maintenance across session w/ min cues in 3/5 opp over 3 consecutive sessions.    * Mr. Lopez maintained topics relating to  previous activities throughout the day.     9. Pt will label items/pictures presented in 3/5 opp to decrease anomia w/ min cues over 3 consecutive sessions.   * 5/5 pictures of common tasks w/ min cues.      10. Pt will respond to simple/moderate OE questions pertaining to adls w/ moderate cues 3/5 opp over 3 consecutive sessions.   * responds to simple OE questions pertaining to adls w/ mod cues in 2/5 opp.       ----------------------------------------------------------------------------------------------------      DYSPHAGIA THERAPY PLAN OF CARE:     Chance Lopez was seen this am in speech therapy office for formal therapy.      Long Term Goal:  Pt will accept least restrictive diet tolerance w/o overt s/s aspiration.      Short Term Goals:     1. Pt will perform OROM/SHANA exercises x3 sets x10 reps w/ min cues.  * GOAL MET.     2. Pt will perform compensatory techniques during meals w/ min cues.  * GOAL MET           Thank you-  Claribel Lovell M.S., CFY-SLP     Visit Dx:  No diagnosis found.  Patient Active Problem List   Diagnosis   • Colon cancer screening   • Gastritis   • CVA (cerebral vascular accident) (CMS/HCC)     Past Medical History:   Diagnosis Date   • Diabetes mellitus (CMS/HCC)    • Diarrhea    • Full dentures     INSTRUCTED NO ADHESIVES THE DOS. REPORTS USUALLY ONLY WEARS UPPER PLATE.   • Hearing loss     REPORTS MORE LOSS ON LEFT SIDE BUT THAT HE HAS BILATERAL LOSS. NO USE OF HEARING AIDS.   • Hepatitis     REPORTS AS A CHILD.  UNSURE IF TYPE A OR B.   • History of acute pancreatitis     REPORTS PRIOR TO 2000   • History of gout    • Hypertension    • Seasonal allergies    • Stroke (CMS/HCC)    • Wears glasses     RX GLASSES     Past Surgical History:   Procedure Laterality Date   • COLONOSCOPY N/A 11/26/2018    Procedure: COLONOSCOPY;  Surgeon: Ha Sykes MD;  Location: [facility] ENDOSCOPY;  Service: Gastroenterology   • ENDOSCOPY N/A 11/26/2018    Procedure: ESOPHAGOGASTRODUODENOSCOPY with cold  forcep biopsy;  Surgeon: Ha Sykes MD;  Location: [facility] ENDOSCOPY;  Service: Gastroenterology   • TOOTH EXTRACTION        EDUCATION  The patient has been educated in the following areas:     Cognitive Impairment Communication Impairment.    Impression: Mr. Lopez continues to make progress towards his goals.     SLP Recommendation and Plan   Continue current POC to allow for maximal opportunities for improvement.     Thank you   Claribel Lovell M.S., CFY-SLP                   Time Calculation:     Time Calculation- SLP     Row Name 07/13/21 1527 07/13/21 1210          Time Calculation- SLP    SLP Start Time  1245  -JR  0915  -JR     SLP Stop Time  1320  -JR  1005  -JR     SLP Time Calculation (min)  35 min  -JR  50 min  -JR     SLP - Next Appointment  07/14/21  -JR  07/14/21  -       User Key  (r) = Recorded By, (t) = Taken By, (c) = Cosigned By    Initials Name Provider Type    Claribel Correa MS, CFY-SLP Speech and Language Pathologist          Therapy Charges for Today     Code Description Service Date Service Provider Modifiers Qty    76913907885 HC ST TREATMENT SPEECH 3 7/12/2021 Claribel Lovell MS, CFY-SLP GN 1    10579006039 HC ST TREATMENT SPEECH 3 7/12/2021 Claribel Lovell MS, CFY-SLP GN 1    31642475475 HC ST TREATMENT SPEECH 3 7/13/2021 Claribel Lovell MS, CFY-SLP GN 1    98893464851 HC ST TREATMENT SPEECH 2 7/13/2021 Claribel Lovell MS, CFY-SLP GN 1                     Claribel Lovell MS, CFY-SLP  7/13/2021

## 2021-07-13 NOTE — THERAPY TREATMENT NOTE
Inpatient Rehabilitation - Occupational Therapy Treatment Note     [facility]     Patient Name: Chance Lopez  : 1955  MRN: [id number]    Today's Date: 2021                 Admit Date: 2021       No diagnosis found.    Patient Active Problem List   Diagnosis   • Colon cancer screening   • Gastritis   • CVA (cerebral vascular accident) (CMS/HCC)       Past Medical History:   Diagnosis Date   • Diabetes mellitus (CMS/HCC)    • Diarrhea    • Full dentures     INSTRUCTED NO ADHESIVES THE DOS. REPORTS USUALLY ONLY WEARS UPPER PLATE.   • Hearing loss     REPORTS MORE LOSS ON LEFT SIDE BUT THAT HE HAS BILATERAL LOSS. NO USE OF HEARING AIDS.   • Hepatitis     REPORTS AS A CHILD.  UNSURE IF TYPE A OR B.   • History of acute pancreatitis     REPORTS PRIOR TO    • History of gout    • Hypertension    • Seasonal allergies    • Stroke (CMS/HCC)    • Wears glasses     RX GLASSES       Past Surgical History:   Procedure Laterality Date   • COLONOSCOPY N/A 2018    Procedure: COLONOSCOPY;  Surgeon: Ha Sykes MD;  Location: [facility] ENDOSCOPY;  Service: Gastroenterology   • ENDOSCOPY N/A 2018    Procedure: ESOPHAGOGASTRODUODENOSCOPY with cold forcep biopsy;  Surgeon: Ha Sykes MD;  Location: [facility] ENDOSCOPY;  Service: Gastroenterology   • TOOTH EXTRACTION                  IRF OT ASSESSMENT FLOWSHEET (last 12 hours)      IRF OT Evaluation and Treatment     Row Name 21 1000          OT Time and Intention    Document Type  daily treatment  -     Mode of Treatment  individual therapy;occupational therapy  -     Patient Effort  good  -     Row Name 21 1000          General Information    Patient/Family/Caregiver Comments/Observations   patient agreeable to therapy this am. patient completed functional table top activities as well as self care.  patient continues to require verbal cues for acitivty completion.  -     Row Name 21 1000          Transfers    Chair-Bed  Rabun (Transfers)  supervision;verbal cues  -[facility] Name 07/13/21 1000          Motor Skills    Motor Skills  coordination;functional endurance;therapeutic exercise  -     Therapeutic Exercise  -- BUE ROM,fmc,gmc  -[facility] Name 07/13/21 1000          Upper Body Dressing    Rabun Level (Upper Body Dressing)  supervision;verbal cues  -[facility] Name 07/13/21 1000          Lower Body Dressing    Rabun Level (Lower Body Dressing)  verbal cues;standby assist;supervision  -[facility] Name 07/13/21 1000          Grooming    Rabun Level (Grooming)  supervision;verbal cues  -[facility] Name 07/13/21 1000          Toileting    Rabun Level (Toileting)  supervision;contact guard assist;verbal cues  -       User Key  (r) = Recorded By, (t) = Taken By, (c) = Cosigned By    Initials Name Effective Dates    Ambreen Montgomery, OT 06/16/21 -            Occupational Therapy Education                 Title: PT OT SLP Therapies (Done)     Topic: Occupational Therapy (Done)     Point: ADL training (Done)     Description:   Instruct learner(s) on proper safety adaptation and remediation techniques during self care or transfers.   Instruct in proper use of assistive devices.              Learning Progress Summary           Patient Acceptance, E,TB, VU by RE at 7/12/2021 2205    Acceptance, E,D, NR by BRITTANI at 7/8/2021 1245    Acceptance, E,TB, VU by RF at 7/5/2021 1613                   Point: Home exercise program (Done)     Description:   Instruct learner(s) on appropriate technique for monitoring, assisting and/or progressing therapeutic exercises/activities.              Learning Progress Summary           Patient Acceptance, E,TB, VU by RE at 7/12/2021 2205    Acceptance, E,TB, VU by RF at 7/5/2021 1613                   Point: Precautions (Done)     Description:   Instruct learner(s) on prescribed precautions during self-care and functional transfers.              Learning Progress  Summary           Patient Acceptance, E,TB, VU by  at 7/12/2021 2205    Acceptance, E,D, NR by  at 7/8/2021 1245    Acceptance, E,TB, VU by  at 7/5/2021 1613                   Point: Body mechanics (Done)     Description:   Instruct learner(s) on proper positioning and spine alignment during self-care, functional mobility activities and/or exercises.              Learning Progress Summary           Patient Acceptance, E,TB, VU by  at 7/12/2021 2205    Acceptance, E,TB, VU by  at 7/5/2021 1613                               User Key     Initials Effective Dates Name Provider Type Discipline     06/16/21 -  Ramonita Duke, RN Registered Nurse Nurse     06/16/21 -  Farnaz Camejo OT Occupational Therapist OT     06/16/21 -  Nuris Mccollum PTA Physical Therapy Assistant PT                    OT Recommendation and Plan    Planned Therapy Interventions (OT): activity tolerance training, adaptive equipment training, BADL retraining, neuromuscular control/coordination retraining, patient/caregiver education/training, ROM/therapeutic exercise, strengthening exercise, transfer/mobility retraining                    Time Calculation:     Time Calculation- OT     Row Name 07/13/21 1055             Time Calculation- OT    OT Start Time  0745  -      OT Stop Time  0915  -      OT Time Calculation (min)  90 min  -        User Key  (r) = Recorded By, (t) = Taken By, (c) = Cosigned By    Initials Name Provider Type     Ambreen Marinelli OT Occupational Therapist        Therapy Charges for Today     Code Description Service Date Service Provider Modifiers Qty    03142314376 HC OT THERAPEUTIC ACT EA 15 MIN 7/12/2021 Ambreen Marinelli OT GO 2    06078661446 HC OT SELF CARE/MGMT/TRAIN EA 15 MIN 7/12/2021 Ambreen Marinelli OT GO 2    82986210865 HC OT NEUROMUSC RE EDUCATION EA 15 MIN 7/12/2021 Ambreen Marinelli OT GO 2    46212434819 HC OT SELF CARE/MGMT/TRAIN EA 15 MIN 7/13/2021 Isis  Ambreen Ashley, OT GO 2    [id number]  OT THERAPEUTIC ACT EA 15 MIN 7/13/2021 Ambreen Marinelli OT GO 2    [id number]  OT NEUROMUSC RE EDUCATION EA 15 MIN 7/13/2021 Ambreen Marinelli, OT GO 2                   Ambreen Marinelli OT  7/13/2021

## 2021-07-14 LAB
GLUCOSE BLDC GLUCOMTR-MCNC: 170 MG/DL (ref 70–130)
GLUCOSE BLDC GLUCOMTR-MCNC: 194 MG/DL (ref 70–130)
GLUCOSE BLDC GLUCOMTR-MCNC: 208 MG/DL (ref 70–130)
GLUCOSE BLDC GLUCOMTR-MCNC: 217 MG/DL (ref 70–130)

## 2021-07-14 PROCEDURE — 97535 SELF CARE MNGMENT TRAINING: CPT | Performed by: OCCUPATIONAL THERAPIST

## 2021-07-14 PROCEDURE — 97110 THERAPEUTIC EXERCISES: CPT | Performed by: OCCUPATIONAL THERAPIST

## 2021-07-14 PROCEDURE — 92507 TX SP LANG VOICE COMM INDIV: CPT

## 2021-07-14 PROCEDURE — 63710000001 INSULIN DETEMIR PER 5 UNITS: Performed by: INTERNAL MEDICINE

## 2021-07-14 PROCEDURE — 97530 THERAPEUTIC ACTIVITIES: CPT | Performed by: OCCUPATIONAL THERAPIST

## 2021-07-14 PROCEDURE — 97116 GAIT TRAINING THERAPY: CPT

## 2021-07-14 PROCEDURE — 99231 SBSQ HOSP IP/OBS SF/LOW 25: CPT | Performed by: FAMILY MEDICINE

## 2021-07-14 PROCEDURE — 97530 THERAPEUTIC ACTIVITIES: CPT

## 2021-07-14 PROCEDURE — 63710000001 INSULIN ASPART PER 5 UNITS: Performed by: FAMILY MEDICINE

## 2021-07-14 PROCEDURE — 97110 THERAPEUTIC EXERCISES: CPT

## 2021-07-14 PROCEDURE — 82962 GLUCOSE BLOOD TEST: CPT

## 2021-07-14 PROCEDURE — 25010000002 ENOXAPARIN PER 10 MG: Performed by: FAMILY MEDICINE

## 2021-07-14 RX ADMIN — ASPIRIN 81 MG: 81 TABLET, COATED ORAL at 08:19

## 2021-07-14 RX ADMIN — INSULIN ASPART 10 UNITS: 100 INJECTION, SOLUTION INTRAVENOUS; SUBCUTANEOUS at 13:05

## 2021-07-14 RX ADMIN — CARVEDILOL 12.5 MG: 6.25 TABLET, FILM COATED ORAL at 08:19

## 2021-07-14 RX ADMIN — INSULIN DETEMIR 15 UNITS: 100 INJECTION, SOLUTION SUBCUTANEOUS at 21:04

## 2021-07-14 RX ADMIN — Medication 1 TABLET: at 08:20

## 2021-07-14 RX ADMIN — CLOPIDOGREL 75 MG: 75 TABLET, FILM COATED ORAL at 08:19

## 2021-07-14 RX ADMIN — ATORVASTATIN CALCIUM 40 MG: 40 TABLET, FILM COATED ORAL at 20:54

## 2021-07-14 RX ADMIN — CETIRIZINE HYDROCHLORIDE 10 MG: 10 TABLET, FILM COATED ORAL at 08:19

## 2021-07-14 RX ADMIN — INSULIN ASPART 10 UNITS: 100 INJECTION, SOLUTION INTRAVENOUS; SUBCUTANEOUS at 17:28

## 2021-07-14 RX ADMIN — DOCUSATE SODIUM 50 MG AND SENNOSIDES 8.6 MG 2 TABLET: 8.6; 5 TABLET, FILM COATED ORAL at 20:54

## 2021-07-14 RX ADMIN — INSULIN ASPART 10 UNITS: 100 INJECTION, SOLUTION INTRAVENOUS; SUBCUTANEOUS at 07:42

## 2021-07-14 RX ADMIN — PAROXETINE HYDROCHLORIDE 20 MG: 20 TABLET, FILM COATED ORAL at 08:20

## 2021-07-14 RX ADMIN — OLANZAPINE 5 MG: 5 TABLET, FILM COATED ORAL at 20:54

## 2021-07-14 RX ADMIN — CARVEDILOL 12.5 MG: 6.25 TABLET, FILM COATED ORAL at 17:28

## 2021-07-14 RX ADMIN — ENOXAPARIN SODIUM 40 MG: 40 INJECTION SUBCUTANEOUS at 20:54

## 2021-07-14 RX ADMIN — FLUTICASONE PROPIONATE 2 SPRAY: 50 SPRAY, METERED NASAL at 08:20

## 2021-07-14 NOTE — SIGNIFICANT NOTE
07/14/21 1603   Plan   Plan Informed pt about insurance approving admission at [facility] who can accept him on 7-15-21.  Informed pt [facility] Ulysses [facility]  Pt is agreeable to plans.   Patient/Family in Agreement with Plan yes

## 2021-07-14 NOTE — SIGNIFICANT NOTE
07/14/21 0829   Plan   Plan Pt's insurance has approved continued rehab stay through 7-19-21 per JR Singh, .  [facility] is awaiting insurance decision about prior-authorization of admission.

## 2021-07-14 NOTE — THERAPY TREATMENT NOTE
Inpatient Rehabilitation - Physical Therapy Treatment Note        [place]     Patient Name: Chance Lopez  : 1955  MRN: [id number]    Today's Date: 2021                    Admit Date: 2021      Visit Dx: No diagnosis found.    Patient Active Problem List   Diagnosis   • Colon cancer screening   • Gastritis   • CVA (cerebral vascular accident) (CMS/HCC)       Past Medical History:   Diagnosis Date   • Diabetes mellitus (CMS/HCC)    • Diarrhea    • Full dentures     INSTRUCTED NO ADHESIVES THE DOS. REPORTS USUALLY ONLY WEARS UPPER PLATE.   • Hearing loss     REPORTS MORE LOSS ON LEFT SIDE BUT THAT HE HAS BILATERAL LOSS. NO USE OF HEARING AIDS.   • Hepatitis     REPORTS AS A CHILD.  UNSURE IF TYPE A OR B.   • History of acute pancreatitis     REPORTS PRIOR TO    • History of gout    • Hypertension    • Seasonal allergies    • Stroke (CMS/HCC)    • Wears glasses     RX GLASSES       Past Surgical History:   Procedure Laterality Date   • COLONOSCOPY N/A 2018    Procedure: COLONOSCOPY;  Surgeon: Ha Sykes MD;  Location: [facility] ENDOSCOPY;  Service: Gastroenterology   • ENDOSCOPY N/A 2018    Procedure: ESOPHAGOGASTRODUODENOSCOPY with cold forcep biopsy;  Surgeon: Ha Sykes MD;  Location: [facility] ENDOSCOPY;  Service: Gastroenterology   • TOOTH EXTRACTION            PT ASSESSMENT (last 12 hours)      IRF PT Evaluation and Treatment     Row Name 21 1419 21 1011       PT Time and Intention    Document Type  daily treatment AM session  -RG  daily treatment AM session  -LL    Mode of Treatment  individual therapy;physical therapy  -RG  individual therapy;physical therapy  -LL    Patient/Family/Caregiver Comments/Observations  Pt and nursing in agreement for skilled PT on this date.   -RG  Patient agreeable to PT session this date.  He continues to exhibit decreased procressing  at times.  -LL    Row Name 21 1419          Cognition/Psychosocial    Personal Safety  Interventions  gait belt;fall prevention program maintained;nonskid shoes/slippers when out of bed  -RG     Row Name 07/14/21 1011          Bed Mobility    Comment (Bed Mobility)  Up in chair  -LL     Row Name 07/14/21 1419 07/14/21 1011       Transfers    Sit-Stand [place] (Transfers)  standby assist  -RG  standby assist  -LL    Stand-Sit [place] (Transfers)  verbal cues;standby assist  -RG  verbal cues;standby assist  -LL    Row Name 07/14/21 1419 07/14/21 1011       Car Transfer    Type (Car Transfer)  stand pivot/stand step  -RG  stand pivot/stand step  -LL    [place] Level (Car Transfer)  contact guard;supervision  -RG  contact guard;supervision  -LL    Row Name 07/14/21 1419 07/14/21 1011       Gait/Stairs (Locomotion)    [place] Level (Gait)  contact guard  -RG  contact guard  -LL    Assistive Device (Gait)  -- None  -RG  -- None  -LL    Distance in Feet (Gait)  320  -RG  320'  -LL    Deviations/Abnormal Patterns (Gait)  stride length decreased;steppage;weight shifting decreased  -RG  stride length decreased;steppage;weight shifting decreased  -LL    [place] Level (Stairs)  contact guard  -RG  contact guard  -LL    Handrail Location (Stairs)  both sides  -RG  both sides  -LL    Number of Steps (Stairs)  --  15  -LL    Ascending Technique (Stairs)  step-over-step  -RG  step-over-step  -LL    Descending Technique (Stairs)  step-over-step  -RG  step-over-step  -LL    Stairs, Impairments  motor control impaired;coordination impaired  -RG  motor control impaired;coordination impaired  -LL    Comment (Gait/Stairs)  no AD  -RG  --    Row Name 07/14/21 1011          Balance    Comment, Balance  Weaving in/out of cones; side stepping  -LL     Row Name 07/14/21 1419 07/14/21 1011       Hip (Therapeutic Exercise)    Hip Strengthening (Therapeutic Exercise)  bilateral;flexion;aBduction;aDduction;marching while seated;sitting;2 lb free weight;resistance band;green;10 repetitions;2 sets  -RG   bilateral;flexion;extension;aBduction;aDduction;marching while seated;sitting;2 lb free weight  -LL    Row Name 07/14/21 1419 07/14/21 1011       Knee (Therapeutic Exercise)    Knee Strengthening (Therapeutic Exercise)  bilateral;marching while seated;LAQ (long arc quad);hamstring curls;sitting;2 lb free weight;resistance band;green;10 repetitions;2 sets  -RG  bilateral;flexion;extension;marching while seated;LAQ (long arc quad);sitting;2 lb free weight  -LL    Row Name 07/14/21 1419          Ankle (Therapeutic Exercise)    Ankle Strengthening (Therapeutic Exercise)  bilateral;dorsiflexion;plantarflexion;sitting;10 repetitions;2 sets  -RG     Row Name 07/14/21 1419          Aerobic Exercise    Type (Aerobic Exercise)  recumbent stationary bike  -RG     Time Performed (Aerobic Exercise)  15  -RG     Comment, Aerobic Exercise (Therapeutic Exercise)  L1  -RG     Row Name 07/14/21 1419 07/14/21 1011       Positioning and Restraints    Pre-Treatment Position  sitting in chair/recliner  -RG  -- WC with OT  -LL    Post Treatment Position  bed  -RG  wheelchair  -LL    In Bed  notified nsg;supine;call light within reach;encouraged to call for assist;exit alarm on  -RG  --    In Wheelchair  --  sitting;with SLP  -LL      User Key  (r) = Recorded By, (t) = Taken By, (c) = Cosigned By    Initials Name Provider Type    Crystal Paulson PTA Physical Therapy Assistant    Robin Pinon PTA Physical Therapy Assistant           Physical Therapy Education                 Title: PT OT SLP Therapies (Done)     Topic: Physical Therapy (Done)     Point: Mobility training (Done)     Learning Progress Summary           Patient Acceptance, E,D, VU,NR by  at 7/14/2021 1423    Acceptance, E,D, VU by LL at 7/14/2021 1026    Acceptance, E,D, VU,NR by RG at 7/13/2021 1431    Acceptance, E,TB, VU by DG at 7/12/2021 2205    Acceptance, E,D, VU,NR by RG at 7/12/2021 1440    Acceptance, E,D, VU,NR by RG at 7/8/2021 1442    Acceptance, E,D,  VU,NR by RG at 7/7/2021 1519    Acceptance, E,D, VU,NR by RG at 7/7/2021 1517    Acceptance, E,TB, VU by RF at 7/6/2021 1545    Acceptance, E,TB, VU by RF at 7/5/2021 1613    Acceptance, E, VU,NR by LB at 7/3/2021 1357    Acceptance, E, VU,NR by LB at 7/2/2021 1524                   Point: Home exercise program (Done)     Learning Progress Summary           Patient Acceptance, E,D, VU,NR by RG at 7/14/2021 1423    Acceptance, E,D, VU by LL at 7/14/2021 1026    Acceptance, E,D, VU,NR by RG at 7/13/2021 1431    Acceptance, E,TB, VU by DG at 7/12/2021 2205    Acceptance, E,D, VU,NR by RG at 7/12/2021 1440    Acceptance, E,D, VU,NR by RG at 7/8/2021 1442    Acceptance, E,D, VU,NR by RG at 7/7/2021 1519    Acceptance, E,D, VU,NR by RG at 7/7/2021 1517    Acceptance, E,TB, VU by RF at 7/6/2021 1545    Acceptance, E,TB, VU by RF at 7/5/2021 1613    Acceptance, E, VU,NR by LB at 7/3/2021 1357    Acceptance, E, VU,NR by LB at 7/2/2021 1524                   Point: Body mechanics (Done)     Learning Progress Summary           Patient Acceptance, E,D, VU,NR by RG at 7/14/2021 1423    Acceptance, E,D, VU by LL at 7/14/2021 1026    Acceptance, E,D, VU,NR by RG at 7/13/2021 1431    Acceptance, E,TB, VU by DG at 7/12/2021 2205    Acceptance, E,D, VU,NR by RG at 7/12/2021 1440    Acceptance, E,D, VU,NR by RG at 7/8/2021 1442    Acceptance, E,D, VU,NR by RG at 7/7/2021 1519    Acceptance, E,D, VU,NR by RG at 7/7/2021 1517    Acceptance, E,TB, VU by RF at 7/6/2021 1545    Acceptance, E,TB, VU by RF at 7/5/2021 1613    Acceptance, E, VU,NR by LB at 7/3/2021 1357    Acceptance, E, VU,NR by LB at 7/2/2021 1524                   Point: Precautions (Done)     Learning Progress Summary           Patient Acceptance, E,D, VU,NR by RG at 7/14/2021 1423    Acceptance, E,D, VU by LL at 7/14/2021 1026    Acceptance, E,D, VU,NR by RG at 7/13/2021 1431    Acceptance, E,TB, VU by DG at 7/12/2021 2205    Acceptance, E,D, VU,NR by RG at 7/12/2021  1440    Acceptance, E,D, VU,NR by RG at 7/8/2021 1442    Acceptance, E,D, VU,NR by RG at 7/7/2021 1519    Acceptance, E,D, VU,NR by RG at 7/7/2021 1517    Acceptance, E,TB, VU by RF at 7/6/2021 1545    Acceptance, E,TB, VU by RF at 7/5/2021 1613    Acceptance, E, VU,NR by LB at 7/3/2021 1357    Acceptance, E, VU,NR by LB at 7/2/2021 1524                               User Key     Initials Effective Dates Name Provider Type Discipline    DG 06/16/21 -  Ramonita Duke RN Registered Nurse Nurse    LB 06/16/21 -  Linda Rodríguez, PT Physical Therapist PT    LL 05/02/16 -  Crystal Givens PTA Physical Therapy Assistant PT     06/16/21 -  Nuris Mccollum PTA Physical Therapy Assistant PT     06/16/21 -  Robin Johnson PTA Physical Therapy Assistant PT                PT Recommendation and Plan    Frequency of Treatment (PT): 5 times per week  Anticipated Equipment Needs at Discharge (PT Eval):  (tbd)  Daily Progress Summary (PT)  Impairments Still Limiting Function (PT): strength decreased, coordination impaired, impaired functional activity tolerance, motor control impaired               Time Calculation:     PT Charges     Row Name 07/14/21 1423 07/14/21 1026          Time Calculation    Start Time  1330  -  0915  -     Stop Time  1415  -  1000  -     Time Calculation (min)  45 min  -RG  45 min  -     PT Received On  07/14/21  -  07/14/21  -     PT Goal Re-Cert Due Date  --  07/15/21  -        Time Calculation- PT    Total Timed Code Minutes- PT  45 minute(s)  -RG  45 minute(s)  -LL       User Key  (r) = Recorded By, (t) = Taken By, (c) = Cosigned By    Initials Name Provider Type    LL Crystal Givens, AURELIO Physical Therapy Assistant     Robin Johnson PTA Physical Therapy Assistant          Therapy Charges for Today     Code Description Service Date Service Provider Modifiers Qty    06122408685 HC GAIT TRAINING EA 15 MIN 7/13/2021 Robin Johnson PTA GP 2    20430234577 HC PT  THERAPEUTIC ACT EA 15 MIN 7/13/2021 Robin Johnson, PTA GP 2    03253169349 HC PT THER PROC EA 15 MIN 7/13/2021 Robin Johnson, PTA GP 2    17638536172 HC GAIT TRAINING EA 15 MIN 7/14/2021 Robin Johnson, PTA GP 1    97458258492 HC PT THERAPEUTIC ACT EA 15 MIN 7/14/2021 Robin Johnson, PTA GP 1    38040507080 HC PT THER PROC EA 15 MIN 7/14/2021 Robin Johnson, PTA GP 1                   Robin Johnson PTA  7/14/2021

## 2021-07-14 NOTE — SIGNIFICANT NOTE
07/14/21 8268   Plan   Plan Spoke to brandt Arora [phone].  Informed him SS is awaiting call from [facility] when they receive a decision from insurance.  Will follow.

## 2021-07-14 NOTE — SIGNIFICANT NOTE
07/13/21 1035   Plan   Plan Team conference held today.  Pt is pending SNF placement.  [phone] KAYLENE Saleh, who says they are still waiting on insurance decision.  SS to be contacted when insurance makes their decision about admission to SNF.

## 2021-07-14 NOTE — SIGNIFICANT NOTE
07/14/21 1415   Plan   [phone] Gwen who will ask KAYLENE Saleh to contact SS when she returns to the facility.

## 2021-07-14 NOTE — THERAPY TREATMENT NOTE
Inpatient Rehabilitation - Occupational Therapy Treatment Note     [place]     Patient Name: hCance Lopez  : 1955  MRN: [id number]    Today's Date: 2021                 Admit Date: 2021       No diagnosis found.    Patient Active Problem List   Diagnosis   • Colon cancer screening   • Gastritis   • CVA (cerebral vascular accident) (CMS/HCC)       Past Medical History:   Diagnosis Date   • Diabetes mellitus (CMS/HCC)    • Diarrhea    • Full dentures     INSTRUCTED NO ADHESIVES THE DOS. REPORTS USUALLY ONLY WEARS UPPER PLATE.   • Hearing loss     REPORTS MORE LOSS ON LEFT SIDE BUT THAT HE HAS BILATERAL LOSS. NO USE OF HEARING AIDS.   • Hepatitis     REPORTS AS A CHILD.  UNSURE IF TYPE A OR B.   • History of acute pancreatitis     REPORTS PRIOR TO    • History of gout    • Hypertension    • Seasonal allergies    • Stroke (CMS/HCC)    • Wears glasses     RX GLASSES       Past Surgical History:   Procedure Laterality Date   • COLONOSCOPY N/A 2018    Procedure: COLONOSCOPY;  Surgeon: Ha Sykes MD;  Location: [facility] ENDOSCOPY;  Service: Gastroenterology   • ENDOSCOPY N/A 2018    Procedure: ESOPHAGOGASTRODUODENOSCOPY with cold forcep biopsy;  Surgeon: Ha Sykes MD;  Location: [facility] ENDOSCOPY;  Service: Gastroenterology   • TOOTH EXTRACTION                  IRF OT ASSESSMENT FLOWSHEET (last 12 hours)      IRF OT Evaluation and Treatment     Row Name 21 1500          OT Time and Intention    Document Type  daily treatment  -     Mode of Treatment  individual therapy;occupational therapy  -     Row Name 21 1500          General Information    Patient/Family/Caregiver Comments/Observations  patient agreeable to therapy this am. patient continue to require verbal cues for safe activity performance  -     Existing Precautions/Restrictions  fall  -     Row Name 21 1500          Transfers    Sit-Stand [place] (Transfers)  supervision;verbal cues  -      [place] Level (Toilet Transfer)  supervision  -     Row Name 07/14/21 1500          Toilet Transfer    Type (Toilet Transfer)  stand pivot/stand step  -[facility] Name 07/14/21 1500          Motor Skills    Motor Skills  coordination;functional endurance;therapeutic exercise;neuro-muscular function  -     Therapeutic Exercise  -- BUE UE bike, ROM, gmc,fmc  -[facility] Name 07/14/21 1500          Lower Body Dressing    [place] Level (Lower Body Dressing)  supervision;verbal cues  -[facility] Name 07/14/21 1500          Grooming    [place] Level (Grooming)  supervision;verbal cues  -[facility] Name 07/14/21 1500          Toileting    [place] Level (Toileting)  supervision;verbal cues  -       User Key  (r) = Recorded By, (t) = Taken By, (c) = Cosigned By    Initials Name Effective Dates    Ambreen Montgomery, OT 06/16/21 -            Occupational Therapy Education                 Title: PT OT SLP Therapies (Done)     Topic: Occupational Therapy (Done)     Point: ADL training (Done)     Description:   Instruct learner(s) on proper safety adaptation and remediation techniques during self care or transfers.   Instruct in proper use of assistive devices.              Learning Progress Summary           Patient Acceptance, E,TB, VU by RE at 7/12/2021 2205    Acceptance, E,D, NR by BRITTANI at 7/8/2021 1245    Acceptance, E,TB, VU by RF at 7/5/2021 1613                   Point: Home exercise program (Done)     Description:   Instruct learner(s) on appropriate technique for monitoring, assisting and/or progressing therapeutic exercises/activities.              Learning Progress Summary           Patient Acceptance, E,TB, VU by RE at 7/12/2021 2205    Acceptance, E,TB, VU by RF at 7/5/2021 1613                   Point: Precautions (Done)     Description:   Instruct learner(s) on prescribed precautions during self-care and functional transfers.              Learning Progress Summary           Patient  Acceptance, E,TB, VU by  at 7/12/2021 2205    Acceptance, E,D, NR by  at 7/8/2021 1245    Acceptance, E,TB, VU by  at 7/5/2021 1613                   Point: Body mechanics (Done)     Description:   Instruct learner(s) on proper positioning and spine alignment during self-care, functional mobility activities and/or exercises.              Learning Progress Summary           Patient Acceptance, E,TB, VU by  at 7/12/2021 2205    Acceptance, E,TB, VU by  at 7/5/2021 1613                               User Key     Initials Effective Dates Name Provider Type Discipline     06/16/21 -  Ramonita Duke, RN Registered Nurse Nurse     06/16/21 -  Farnaz Camejo, OT Occupational Therapist OT     06/16/21 -  Nuris Mccollum PTA Physical Therapy Assistant PT                    OT Recommendation and Plan    Planned Therapy Interventions (OT): activity tolerance training, adaptive equipment training, BADL retraining, neuromuscular control/coordination retraining, patient/caregiver education/training, ROM/therapeutic exercise, strengthening exercise, transfer/mobility retraining                    Time Calculation:     Time Calculation- OT     Row Name 07/14/21 1602             Time Calculation- OT    OT Start Time  0745  -      OT Stop Time  0915  -      OT Time Calculation (min)  90 min  -        User Key  (r) = Recorded By, (t) = Taken By, (c) = Cosigned By    Initials Name Provider Type     Ambreen Marinelli, OT Occupational Therapist        Therapy Charges for Today     Code Description Service Date Service Provider Modifiers Qty    11304443962 HC OT SELF CARE/MGMT/TRAIN EA 15 MIN 7/13/2021 Ambreen Marinelli, OT GO 2    11169677885 HC OT THERAPEUTIC ACT EA 15 MIN 7/13/2021 Ambreen Marinelli OT GO 2    36897425000 HC OT NEUROMUSC RE EDUCATION EA 15 MIN 7/13/2021 Ambreen Marinelli OT GO 2    11028595511 HC OT THERAPEUTIC ACT EA 15 MIN 7/14/2021 Ambreen Marinelli, OT GO 2     16686878177 HC OT SELF CARE/MGMT/TRAIN EA 15 MIN 7/14/2021 Ambreen Marinelli, OT GO 2    06019038619 HC OT THER PROC EA 15 MIN 7/14/2021 Ambreen Marinelli OT GO 2                   Ambreen Marinelli, OT  7/14/2021

## 2021-07-14 NOTE — PROGRESS NOTES
[facility] REHAB PROGRESS NOTE     Patient Identification:  Name:  Chance Lopez  Age:  65 y.o.  Sex:  male  :  1955  MRN:  [id number]  Visit Number:  [id number]  ROOM: [facility]     Primary Care Provider:  Ramos Gurrola MD    Length of stay in inpatient status:  13    Subjective     Chief Compliant:  No chief complaint on file.      History of Presenting Illness: 65-year-old gentleman with history of CVA involving bihemispheric MCA distributions, diabetes mellitus, acute kidney injury, hypertension, encephalopathy, dementia.  Patient continues to do well doing ambulation and task but does have short-term memory deficit.  Patient probably not able to live on his own at this time.    Objective     Current Hospital Meds:aspirin, 81 mg, Oral, Daily  atorvastatin, 40 mg, Oral, Nightly  carvedilol, 12.5 mg, Oral, BID With Meals  cetirizine, 10 mg, Oral, Daily  clopidogrel, 75 mg, Oral, Daily  enoxaparin, 40 mg, Subcutaneous, Q24H  fluticasone, 2 spray, Each Nare, Daily  insulin aspart, 10 Units, Subcutaneous, TID With Meals  insulin detemir, 15 Units, Subcutaneous, Nightly  multivitamin, 1 tablet, Oral, Daily  OLANZapine, 5 mg, Oral, Nightly  PARoxetine, 20 mg, Oral, Daily  sennosides-docusate, 2 tablet, Oral, BID       ----------------------------------------------------------------------------------------------------------------------  Vital Signs:  Temp:  [98.3 °F (36.8 °C)-99.4 °F (37.4 °C)] 98.3 °F (36.8 °C)  Heart Rate:  [72-83] 79  Resp:  [18-20] 18  BP: (123-131)/(62-74) 131/62  SpO2:  [96 %-97 %] 96 %  on   ;   Device (Oxygen Therapy): room air  Body mass index is 24.39 kg/m².    Wt Readings from Last 3 Encounters:   21 79.3 kg (174 lb 13.2 oz)   19 83.6 kg (184 lb 3.2 oz)   18 83.9 kg (184 lb 15.5 oz)     Intake & Output (last 3 days)       701 -  07 -  07 07 -  07 - 07/15 0700    P.O. 1440 1680 1800 240    Total  Intake(mL/kg) 1440 (18.2) 1680 (21.2) 1800 (22.7) 240 (3)    Urine (mL/kg/hr)        Total Output        Net +1440 +1680 +1800 +240            Urine Unmeasured Occurrence 7 x 307 x 8 x 1 x    Stool Unmeasured Occurrence  1 x          Diet Regular; Thin; Consistent Carbohydrate  ----------------------------------------------------------------------------------------------------------------------  Physical exam:  Constitutional:   No acute distress  HEENT: Normocephalic atraumatic  Neck: Supple   Cardiovascular: Regular rate and rhythm  Pulmonary/Chest: Clear to auscultation  Abdominal: Positive bowel sounds soft.   Musculoskeletal: No arthropathy  Neurological: No focal deficits; deficient short-term memory  Skin: No rash  Peripheral vascular:  Genitourinary:  ----------------------------------------------------------------------------------------------------------------------    Last echocardiogram:    ----------------------------------------------------------------------------------------------------------------------  Results from last 7 days   Lab Units 07/09/21  0153   WBC 10*3/mm3 4.30   HEMOGLOBIN g/dL 11.8*   HEMATOCRIT % 34.7*   MCV fL 97.7*   MCHC g/dL 34.0   PLATELETS 10*3/mm3 221         Results from last 7 days   Lab Units 07/10/21  0228 07/09/21  0153   SODIUM mmol/L  --  139   POTASSIUM mmol/L  --  4.6   MAGNESIUM mg/dL 2.5* 1.9   CHLORIDE mmol/L  --  103   CO2 mmol/L  --  23.5   BUN mg/dL  --  28*   CREATININE mg/dL  --  1.43*   EGFR IF NONAFRICN AM mL/min/1.73  --  50*   CALCIUM mg/dL  --  9.5   GLUCOSE mg/dL  --  100*   ALBUMIN g/dL  --  3.58   BILIRUBIN mg/dL  --  0.3   ALK PHOS U/L  --  100   AST (SGOT) U/L  --  30   ALT (SGPT) U/L  --  34   Estimated Creatinine Clearance: 57.8 mL/min (A) (by C-G formula based on SCr of 1.43 mg/dL (H)).  No results found for: AMMONIA              Glucose   Date/Time Value Ref Range Status   07/14/2021 0614 217 (H) 70 - 130 mg/dL Final     Comment:     Meter:  [id number] : [id number] aris nicholas   07/13/2021 1944 146 (H) 70 - 130 mg/dL Final     Comment:     Meter: [id number] : [id number] Laron Hawkinsta Ambreen   07/13/2021 1633 148 (H) 70 - 130 mg/dL Final     Comment:     Meter: [id number] : [id number] Kraig Fernandes   07/13/2021 1100 276 (H) 70 - 130 mg/dL Final     Comment:     Meter: [id number] : [id number] Thakkar Neris   07/13/2021 0615 195 (H) 70 - 130 mg/dL Final     Comment:     Meter: [id number] : [id number] Domingo Crystal   07/12/2021 2000 200 (H) 70 - 130 mg/dL Final     Comment:     Meter: [id number] : [id number] Larry Lisa Ambreen   07/12/2021 1616 207 (H) 70 - 130 mg/dL Final     Comment:     Meter: [id number] : [id number] Thakkar Neris   07/12/2021 1109 314 (H) 70 - 130 mg/dL Final     Comment:     Meter: [id number] : [id number] Thakkar Neris     No results found for: TSH, FREET4  No results found for: PREGTESTUR, PREGSERUM, HCG, HCGQUANT  Pain Management Panel    There is no flowsheet data to display.       Brief Urine Lab Results     None        No results found for: BLOODCX      No results found for: URINECX  No results found for: WOUNDCX  No results found for: STOOLCX        I have personally looked at the labs and they are summarized above.  ----------------------------------------------------------------------------------------------------------------------  Detailed radiology reports for the last 24 hours:    Imaging Results (Last 24 Hours)     ** No results found for the last 24 hours. **        Final impressions for the last 30 days of radiology reports:    No radiology results for the last 30 days.  I have personally looked at the radiology images and read the final radiology report.    Assessment & Plan    Status post CVA with bihemispheric MCA distribution infarctions--patient is on statin therapy and aspirin therapy.  Patient continues to work with speech therapy at this time to improve cognition.  Patient doing well as  far as his PT and OT are concerned.  Patient currently being evaluated for possible nursing home placement    Diabetes mellitus--continue current treatment    Acute kidney injury--stable    Hypertension continue Coreg 12.5 mg twice daily    History of encephalopathy patient is extremely well with Paxil and Zyprexa    VTE Prophylaxis:   Mechanical Order History:     None      Pharmalogical Order History:      Ordered     Dose Route Frequency Stop    07/01/21 9542  enoxaparin (LOVENOX) syringe 40 mg      40 mg SC Every 24 Hours --                    Chance Underwood MD  [facility]  07/14/21  09:38 EDT

## 2021-07-14 NOTE — THERAPY TREATMENT NOTE
Inpatient Rehabilitation - Speech Language Pathology Treatment Note  [facility]     Patient Name: Chance Lopez  : 1955  MRN: [id number]  Today's Date: 2021             Admit Date: 2021     Chance Lopez was seen this am in speech therapy office for continued speech/language/cognitive therapy.  He is pleasant and interactive to participate in all tx tasks.        Long Term Goal:  Pt will demonstrate wfl speech language skills in all activities and w/ all communication partners to complete adls.      Short Term Goals:  1. Pt will recall various pictures/items to increase STM in 3/5 opp over 3 consecutive sessions w/ min cues.   * 5 stimuli objects presented w/ requests to recall. He is able to recall 1 w/o cues independently, 3 w/ min cues, 1 w/ max cues.    * Repeated: 3 independently, 2 min cues.      2. Pt will recall personal LTM information in 3/5 opp over 3 consecutive sessions w/ min cues.   *Mr. Lopez is able to verbally provide his name and  independently.   He independently provides parents names, however states he has four siblings but proceeds to name 6 siblings.      3. Pt will complete multi-step directives tasks related to adls in 3/5 opp over 3 consecutive sessions w/ min cues.   * Pt able to follow 2-step directives independently.  Pt requires cues of repetitions and reminders of previously completed steps for 3+-step directives.     4. Pt will perform sequencing tasks related to adls in 3/5 opp over 3 consecutive sessions w/ min cues.   * Pt requires mod-max cues to complete sequencing tasks of adls of 6 steps w/ repetitions and reminders of previously completed steps.       5. Pt will perform convergent naming tasks w/ min cues in 3/5 opp over 3 consecutive sessions.   * Not addressed at this time.     6. Pt will perform divergent naming tasks w/ min cues in approx 1 min in 3/5 opp over 3 consecutive sessions.   *  Named 2/5 independently, 1/5 w/ min cues.        7. Pt will  perform graphic tasks related to adls w/ min cues in 3/5 opp over 3 consecutive sessions.   * Pt able to write name independently.      8. Pt will demonstrate topic maintenance across session w/ min cues in 3/5 opp over 3 consecutive sessions.    * Mr. Lopez maintained topics w/o cues and in appropriate contexts for approximatley 5 minutes each topic. He independently transitions appropriately to new topic.     9. Pt will label items/pictures presented in 3/5 opp to decrease anomia w/ min cues over 3 consecutive sessions.   * Labels pictures of common nouns in 5/5 opp independently.       10. Pt will respond to simple/moderate OE questions pertaining to adls w/ moderate cues 3/5 opp over 3 consecutive sessions.   * responds to simple OE questions pertaining to adls w/ mod cues in 2/5 opp.       ----------------------------------------------------------------------------------------------------      DYSPHAGIA THERAPY PLAN OF CARE:     Chance Lopez was seen this am in speech therapy office for formal therapy.      Long Term Goal:  Pt will accept least restrictive diet tolerance w/o overt s/s aspiration.      Short Term Goals:     1. Pt will perform OROM/SHANA exercises x3 sets x10 reps w/ min cues.  * GOAL MET     2. Pt will perform compensatory techniques during meals w/ min cues.  * GOAL MET           Thank you-  Claribel Lovell M.S., CFY-SLP     Visit Dx:  No diagnosis found.  Patient Active Problem List   Diagnosis   • Colon cancer screening   • Gastritis   • CVA (cerebral vascular accident) (CMS/HCC)     Past Medical History:   Diagnosis Date   • Diabetes mellitus (CMS/HCC)    • Diarrhea    • Full dentures     INSTRUCTED NO ADHESIVES THE DOS. REPORTS USUALLY ONLY WEARS UPPER PLATE.   • Hearing loss     REPORTS MORE LOSS ON LEFT SIDE BUT THAT HE HAS BILATERAL LOSS. NO USE OF HEARING AIDS.   • Hepatitis     REPORTS AS A CHILD.  UNSURE IF TYPE A OR B.   • History of acute pancreatitis     REPORTS PRIOR TO 2000   • History of  gout    • Hypertension    • Seasonal allergies    • Stroke (CMS/HCC)    • Wears glasses     RX GLASSES     Past Surgical History:   Procedure Laterality Date   • COLONOSCOPY N/A 11/26/2018    Procedure: COLONOSCOPY;  Surgeon: Ha Sykes MD;  Location: [facility] ENDOSCOPY;  Service: Gastroenterology   • ENDOSCOPY N/A 11/26/2018    Procedure: ESOPHAGOGASTRODUODENOSCOPY with cold forcep biopsy;  Surgeon: Ha Sykes MD;  Location: [facility] ENDOSCOPY;  Service: Gastroenterology   • TOOTH EXTRACTION        EDUCATION  The patient has been educated in the following areas:     Cognitive Impairment Communication Impairment.    Impression: Mr. Lopez continues to make progress towards his goals.     SLP Recommendation and Plan   Continue current POC to allow for maximal opportunities for improvement.     Thank you   Claribel Lovell M.S., CFY-SLP                   Time Calculation:         Therapy Charges for Today     Code Description Service Date Service Provider Modifiers Qty    22301368371 HC ST TREATMENT SPEECH 3 7/13/2021 Claribel Lovell MS, CFY-SLP GN 1    52346549954 HC ST TREATMENT SPEECH 2 7/13/2021 Claribel Lovell MS, CFY-SLP GN 1                     Claribel Lovell MS, CFY-SLP  7/14/2021

## 2021-07-14 NOTE — PLAN OF CARE
Goal Outcome Evaluation:  Plan of Care Reviewed With: patient        Progress: improving  Outcome Summary: PROGRESSING MEDICALLY AND PHYSICALLY WITH THERAPIES. CONTINUE  POC

## 2021-07-14 NOTE — PROGRESS NOTES
Case Management  Inpatient Rehabilitation Team Conference    Conference Date/Time: 7/13/2021 6:00:47 AM    Team Conference Attendees:  MD Peace Carbajal, RADHA Springer, JR,   JR Hudson, PT  Gabi Marinelli, OT  Claribel Lovell, PABLO    Demographics            Age: 65Y            Gender: Male    Admission Date: 7/1/2021 5:54:00 PM  Rehabilitation Diagnosis:  Acute ischemic stroke of left periventricular  occipital, acute encephalopathy  Comorbidities: N17.9 Acute kidney failure, unspecified  G93.40 Encephalopathy, unspecified  R13.10 Dysphagia, unspecified  E11.9 Type 2 diabetes mellitus without complications  Z79.4 Long term (current) use of insulin  I10 Essential (primary) hypertension  H91.90 Unspecified hearing loss, unspecified ear      Plan of Care  Anticipated Discharge Date/Estimated Length of Stay: 7-14-21  Anticipated Discharge Destination: Community discharge with assistance  Discharge Plan : Pt plans to return home with his sons/DIL's helping at home.  Medical Necessity Expected Level Rationale: Fair-Good  Intensity and Duration: an average of 3 hours/5 days per week  Medical Supervision and 24 Hour Rehab Nursing: x  Physical Therapy: x  PT Intensity/Duration: 1-1.5 hrs/day, 5-6 days/week  Occupational Therapy: x  OT Intensity/Duration: 1-1.5 hrs/day, 5-6 days/week  Speech and Language Therapy: x  SLP Intensity/Duration: 0.5-1 hr/day, 3-5 days/week  Social Work: x  Therapeutic Recreation: x  Updated (if changes indicated)    Anticipated Discharge Date/Estimated Length of Stay:   Pending      Discharge Plan of Care:    Based on the patient's medical and functional status, their prognosis and  expected level of functional improvement is: Fair-Good      Interdisciplinary Problem/Goals/Status  Communication    [ST] Expression(Active)  Current Status(07/13/2021): Deficits noted w/ speech language  Weekly Goal(07/19/2021): STM memory tasks  Discharge Goal: WFL  communication        Mobility    [PT] Bed/Chair/Wheelchair(Active)  Current Status(07/12/2021): CGA  Weekly Goal(07/19/2021): Sup  Discharge Goal: Sup    [PT] Walk(Active)  Current Status(07/12/2021): amb 320' RW CGA  Weekly Goal(07/19/2021): amb 300' RW Sup  Discharge Goal: amb 300' RW Sup        Pain    [RN] Pain Management(Active)  Current Status(07/01/2021): Potential for pain  Weekly Goal(07/22/2021): No pain this week  Discharge Goal: No pain        Safety    [RN] Potential for Injury(Active)  Current Status(07/01/2021): At risk for injury  Weekly Goal(07/22/2021): No injury this week  Discharge Goal: No injury        Self Care    [OT] Toileting(Active)  Current Status(07/12/2021): cga/sup  Weekly Goal(07/20/2021): sup  Discharge Goal: sup    Comments: Pt is recommended for SNF placement for continued rehab, therefore,  discharge date is pending.  [facility]  prior-authorization from insurance for admission and they are awaiting a  decision.    Signed by: RADHA Rodriguez    Physician CoSigned By: Chance Underwood 07/14/2021 10:38:36

## 2021-07-14 NOTE — SIGNIFICANT NOTE
07/14/21 6247   Plan   Plan SS received [facility] from [facility] who states insurance has approved admission and pt can be accepted on 7-15-21.  Contacted son Ulysses [phone] about insurance approval for admission to [facility] who can accept pt on 7-15-21.  Son will come to rehab around 3:00 pm to transport pt to SNF.   Patient/Family in Agreement with Plan yes

## 2021-07-14 NOTE — THERAPY TREATMENT NOTE
Inpatient Rehabilitation - Physical Therapy Treatment Note        [facility]     Patient Name: Chance Lopez  : 1955  MRN: [id number]    Today's Date: 2021                    Admit Date: 2021      Visit Dx: No diagnosis found.    Patient Active Problem List   Diagnosis   • Colon cancer screening   • Gastritis   • CVA (cerebral vascular accident) (CMS/HCC)       Past Medical History:   Diagnosis Date   • Diabetes mellitus (CMS/HCC)    • Diarrhea    • Full dentures     INSTRUCTED NO ADHESIVES THE DOS. REPORTS USUALLY ONLY WEARS UPPER PLATE.   • Hearing loss     REPORTS MORE LOSS ON LEFT SIDE BUT THAT HE HAS BILATERAL LOSS. NO USE OF HEARING AIDS.   • Hepatitis     REPORTS AS A CHILD.  UNSURE IF TYPE A OR B.   • History of acute pancreatitis     REPORTS PRIOR TO    • History of gout    • Hypertension    • Seasonal allergies    • Stroke (CMS/HCC)    • Wears glasses     RX GLASSES       Past Surgical History:   Procedure Laterality Date   • COLONOSCOPY N/A 2018    Procedure: COLONOSCOPY;  Surgeon: Ha Sykes MD;  Location: [facility] ENDOSCOPY;  Service: Gastroenterology   • ENDOSCOPY N/A 2018    Procedure: ESOPHAGOGASTRODUODENOSCOPY with cold forcep biopsy;  Surgeon: aH Sykes MD;  Location: [facility] ENDOSCOPY;  Service: Gastroenterology   • TOOTH EXTRACTION            PT ASSESSMENT (last 12 hours)      IRF PT Evaluation and Treatment     Row Name 21 1011          PT Time and Intention    Document Type  daily treatment AM session  -LL     Mode of Treatment  individual therapy;physical therapy  -LL     Patient/Family/Caregiver Comments/Observations  Patient agreeable to PT session this date.  He continues to exhibit decreased procressing  at times.  -LL     Row Name 21 1011          Bed Mobility    Comment (Bed Mobility)  Up in chair  -     Row Name 21 1011          Transfers    Sit-Stand [place] (Transfers)  standby assist  -LL     Stand-Sit [place]  (Transfers)  verbal cues;standby assist  -LL     Row Name 07/14/21 1011          Car Transfer    Type (Car Transfer)  stand pivot/stand step  -LL     [place] Level (Car Transfer)  contact guard;supervision  -LL     Row Name 07/14/21 1011          Gait/Stairs (Locomotion)    [place] Level (Gait)  contact guard  -LL     Assistive Device (Gait)  -- None  -LL     Distance in Feet (Gait)  320'  -LL     Deviations/Abnormal Patterns (Gait)  stride length decreased;steppage;weight shifting decreased  -LL     [place] Level (Stairs)  contact guard  -LL     Handrail Location (Stairs)  both sides  -LL     Number of Steps (Stairs)  15  -LL     Ascending Technique (Stairs)  step-over-step  -LL     Descending Technique (Stairs)  step-over-step  -LL     Stairs, Impairments  motor control impaired;coordination impaired  -LL     Row Name 07/14/21 1011          Balance    Comment, Balance  Weaving in/out of cones; side stepping  -LL     Row Name 07/14/21 1011          Hip (Therapeutic Exercise)    Hip Strengthening (Therapeutic Exercise)  bilateral;flexion;extension;aBduction;aDduction;marching while seated;sitting;2 lb free weight  -LL     Row Name 07/14/21 1011          Knee (Therapeutic Exercise)    Knee Strengthening (Therapeutic Exercise)  bilateral;flexion;extension;marching while seated;LAQ (long arc quad);sitting;2 lb free weight  -LL     Row Name 07/14/21 1011          Positioning and Restraints    Pre-Treatment Position  -- WC with OT  -LL     Post Treatment Position  wheelchair  -LL     In Wheelchair  sitting;with SLP  -LL       User Key  (r) = Recorded By, (t) = Taken By, (c) = Cosigned By    Initials Name Provider Type    Crystal Paulson PTA Physical Therapy Assistant           Physical Therapy Education                 Title: PT OT SLP Therapies (Done)     Topic: Physical Therapy (Done)     Point: Mobility training (Done)     Learning Progress Summary           Patient Acceptance, E,D, VU by RAGHAV at  7/14/2021 1026    Acceptance, E,D, VU,NR by RG at 7/13/2021 1431    Acceptance, E,TB, VU by DG at 7/12/2021 2205    Acceptance, E,D, VU,NR by RG at 7/12/2021 1440    Acceptance, E,D, VU,NR by RG at 7/8/2021 1442    Acceptance, E,D, VU,NR by RG at 7/7/2021 1519    Acceptance, E,D, VU,NR by RG at 7/7/2021 1517    Acceptance, E,TB, VU by RF at 7/6/2021 1545    Acceptance, E,TB, VU by RF at 7/5/2021 1613    Acceptance, E, VU,NR by LB at 7/3/2021 1357    Acceptance, E, VU,NR by LB at 7/2/2021 1524                   Point: Home exercise program (Done)     Learning Progress Summary           Patient Acceptance, E,D, VU by LL at 7/14/2021 1026    Acceptance, E,D, VU,NR by RG at 7/13/2021 1431    Acceptance, E,TB, VU by DG at 7/12/2021 2205    Acceptance, E,D, VU,NR by RG at 7/12/2021 1440    Acceptance, E,D, VU,NR by RG at 7/8/2021 1442    Acceptance, E,D, VU,NR by RG at 7/7/2021 1519    Acceptance, E,D, VU,NR by RG at 7/7/2021 1517    Acceptance, E,TB, VU by RF at 7/6/2021 1545    Acceptance, E,TB, VU by RF at 7/5/2021 1613    Acceptance, E, VU,NR by LB at 7/3/2021 1357    Acceptance, E, VU,NR by LB at 7/2/2021 1524                   Point: Body mechanics (Done)     Learning Progress Summary           Patient Acceptance, E,D, VU by LL at 7/14/2021 1026    Acceptance, E,D, VU,NR by RG at 7/13/2021 1431    Acceptance, E,TB, VU by DG at 7/12/2021 2205    Acceptance, E,D, VU,NR by RG at 7/12/2021 1440    Acceptance, E,D, VU,NR by RG at 7/8/2021 1442    Acceptance, E,D, VU,NR by RG at 7/7/2021 1519    Acceptance, E,D, VU,NR by RG at 7/7/2021 1517    Acceptance, E,TB, VU by RF at 7/6/2021 1545    Acceptance, E,TB, VU by RF at 7/5/2021 1613    Acceptance, E, VU,NR by LB at 7/3/2021 1357    Acceptance, E, VU,NR by LB at 7/2/2021 1524                   Point: Precautions (Done)     Learning Progress Summary           Patient Acceptance, E,D, VU by LL at 7/14/2021 1026    Acceptance, E,D, VU,NR by RG at 7/13/2021 1431    Acceptance,  E,TB, VU by DG at 7/12/2021 2205    Acceptance, E,D, VU,NR by RG at 7/12/2021 1440    Acceptance, E,D, VU,NR by RG at 7/8/2021 1442    Acceptance, E,D, VU,NR by RG at 7/7/2021 1519    Acceptance, E,D, VU,NR by RG at 7/7/2021 1517    Acceptance, E,TB, VU by RF at 7/6/2021 1545    Acceptance, E,TB, VU by RF at 7/5/2021 1613    Acceptance, E, VU,NR by LB at 7/3/2021 1357    Acceptance, E, VU,NR by LB at 7/2/2021 1524                               User Key     Initials Effective Dates Name Provider Type Discipline     06/16/21 -  Ramonita Duke RN Registered Nurse Nurse    LB 06/16/21 -  Linda Rodríguez, PT Physical Therapist PT    LL 05/02/16 -  Crystal Givens, AURELIO Physical Therapy Assistant PT    RF 06/16/21 -  Nuris Mccollum PTA Physical Therapy Assistant PT    RG 06/16/21 -  Robin Johnson, AURELIO Physical Therapy Assistant PT                PT Recommendation and Plan                          Time Calculation:     PT Charges     Row Name 07/14/21 1026             Time Calculation    Start Time  0915  -LL      Stop Time  1000  -LL      Time Calculation (min)  45 min  -LL      PT Received On  07/14/21  -LL      PT Goal Re-Cert Due Date  07/15/21  -LL         Time Calculation- PT    Total Timed Code Minutes- PT  45 minute(s)  -LL        User Key  (r) = Recorded By, (t) = Taken By, (c) = Cosigned By    Initials Name Provider Type    LL Crystal Givens PTA Physical Therapy Assistant          Therapy Charges for Today     Code Description Service Date Service Provider Modifiers Qty    85867572868 HC GAIT TRAINING EA 15 MIN 7/14/2021 Crystal Givens, PTA GP 1    68021544750 HC PT THERAPEUTIC ACT EA 15 MIN 7/14/2021 Crystal Givens, PTA GP 1    14620732084 HC PT THER PROC EA 15 MIN 7/14/2021 Crystal Givens, PTA GP 1                   Crystal. AURELIO Givens  7/14/2021

## 2021-07-14 NOTE — THERAPY TREATMENT NOTE
Inpatient Rehabilitation - Speech Language Pathology Treatment Note  [facility]     Patient Name: Chance Lopez  : 1955  MRN: [id number]  Today's Date: 2021             Admit Date: 2021     Chance Lopez was seen this pm in speech therapy office for continued speech/language/cognitive therapy.  He is pleasant and interactive to participate in all tx tasks.        Long Term Goal:  Pt will demonstrate wfl speech language skills in all activities and w/ all communication partners to complete adls.      Short Term Goals:  1. Pt will recall various pictures/items to increase STM in 3/5 opp over 3 consecutive sessions w/ min cues.   * Pt able to recall 2/5 pictures independently, recall 3/5 w/ min-mod cues.         2. Pt will recall personal LTM information in 3/5 opp over 3 consecutive sessions w/ min cues.   * Not specifically addressed on this date.          3. Pt will complete following multi-step directives tasks related to adls in 3/5 opp over 3 consecutive sessions w/ min cues.   * 3 step directives related to adls in 2/5 opp w/ min-mod cues.      4. Pt will perform sequencing tasks related to adls in 3/5 opp over 3 consecutive sessions w/ min cues.   * Not specifically addressed during this session.      5. Pt will perform convergent naming tasks w/ min cues in 3/5 opp over 3 consecutive sessions.   * 3/5 opp w/ mod cues     6. Pt will perform divergent naming tasks w/ min cues in approx 1 min in 3/5 opp over 3 consecutive sessions.   * Able to name 5 items in approx 1 minute w/ min, 3 items independently and 2 w/ min-mod cues.     7. Pt will perform graphic tasks related to adls w/ min cues in 3/5 opp over 3 consecutive sessions.   * Not directly addressed     8. Pt will demonstrate topic maintenance across session w/ min cues in 3/5 opp over 3 consecutive sessions.    * Mr. Lopez maintained topics relating to previous activities throughout the day.     9. Pt will label items/pictures presented in 3/5  opp to decrease anomia w/ min cues over 3 consecutive sessions.   * 5/5 pictures of common tasks w/ min cues.      10. Pt will respond to simple/moderate OE questions pertaining to adls w/ moderate cues 3/5 opp over 3 consecutive sessions.   * responds to simple OE questions pertaining to adls w/ mod cues in 2/5 opp.       ----------------------------------------------------------------------------------------------------      DYSPHAGIA THERAPY PLAN OF CARE:     Chance Lopez was seen this am in speech therapy office for formal therapy.      Long Term Goal:  Pt will accept least restrictive diet tolerance w/o overt s/s aspiration.      Short Term Goals:     1. Pt will perform OROM/SHANA exercises x3 sets x10 reps w/ min cues.  * GOAL MET.     2. Pt will perform compensatory techniques during meals w/ min cues.  * GOAL MET           Thank you-  lCaribel Lovell M.S., CFY-SLP     Visit Dx:  No diagnosis found.  Patient Active Problem List   Diagnosis   • Colon cancer screening   • Gastritis   • CVA (cerebral vascular accident) (CMS/HCC)     Past Medical History:   Diagnosis Date   • Diabetes mellitus (CMS/HCC)    • Diarrhea    • Full dentures     INSTRUCTED NO ADHESIVES THE DOS. REPORTS USUALLY ONLY WEARS UPPER PLATE.   • Hearing loss     REPORTS MORE LOSS ON LEFT SIDE BUT THAT HE HAS BILATERAL LOSS. NO USE OF HEARING AIDS.   • Hepatitis     REPORTS AS A CHILD.  UNSURE IF TYPE A OR B.   • History of acute pancreatitis     REPORTS PRIOR TO 2000   • History of gout    • Hypertension    • Seasonal allergies    • Stroke (CMS/HCC)    • Wears glasses     RX GLASSES     Past Surgical History:   Procedure Laterality Date   • COLONOSCOPY N/A 11/26/2018    Procedure: COLONOSCOPY;  Surgeon: Ha Sykes MD;  Location: [facility] ENDOSCOPY;  Service: Gastroenterology   • ENDOSCOPY N/A 11/26/2018    Procedure: ESOPHAGOGASTRODUODENOSCOPY with cold forcep biopsy;  Surgeon: Ha Sykes MD;  Location: [facility] ENDOSCOPY;  Service:  Gastroenterology   • TOOTH EXTRACTION        EDUCATION  The patient has been educated in the following areas:     Cognitive Impairment Communication Impairment.    Impression: Mr. Lopez continues to make progress towards his goals.     SLP Recommendation and Plan   Continue current POC to allow for maximal opportunities for improvement.     Thank you   Claribel Lovell M.S., CFY-SLP                   Time Calculation:     Time Calculation- SLP     Row Name 07/14/21 1403 07/14/21 1402          Time Calculation- SLP    SLP Start Time  1250  -JR  1000  -JR     SLP Stop Time  1330  -JR  1045  -     SLP Time Calculation (min)  40 min  -JR  45 min  -     SLP - Next Appointment  07/15/21  -JR  07/15/21  -       User Key  (r) = Recorded By, (t) = Taken By, (c) = Cosigned By    Initials Name Provider Type    Claribel Correa MS, CFY-SLP Speech and Language Pathologist          Therapy Charges for Today     Code Description Service Date Service Provider Modifiers Qty    04464636410 HC ST TREATMENT SPEECH 3 7/13/2021 Claribel Lovell MS, CFY-SLP GN 1    66264218979 HC ST TREATMENT SPEECH 2 7/13/2021 Claribel Lovell MS, CFY-SLP GN 1    02544046679 HC ST TREATMENT SPEECH 3 7/14/2021 Claribel Lovell MS, CFY-SLP GN 1    [id number] HC ST TREATMENT SPEECH 3 7/14/2021 Claribel Lovell MS, CFY-SLP GN 1                     Claribel Lovell MS, KITTY-SLP  7/14/2021

## 2021-07-15 VITALS
SYSTOLIC BLOOD PRESSURE: 126 MMHG | TEMPERATURE: 98 F | RESPIRATION RATE: 18 BRPM | DIASTOLIC BLOOD PRESSURE: 74 MMHG | HEIGHT: 71 IN | WEIGHT: 174.82 LBS | BODY MASS INDEX: 24.48 KG/M2 | OXYGEN SATURATION: 98 % | HEART RATE: 72 BPM

## 2021-07-15 LAB
GLUCOSE BLDC GLUCOMTR-MCNC: 121 MG/DL (ref 70–130)
GLUCOSE BLDC GLUCOMTR-MCNC: 233 MG/DL (ref 70–130)

## 2021-07-15 PROCEDURE — 63710000001 INSULIN ASPART PER 5 UNITS: Performed by: FAMILY MEDICINE

## 2021-07-15 PROCEDURE — 92507 TX SP LANG VOICE COMM INDIV: CPT

## 2021-07-15 PROCEDURE — 97530 THERAPEUTIC ACTIVITIES: CPT

## 2021-07-15 PROCEDURE — 82962 GLUCOSE BLOOD TEST: CPT

## 2021-07-15 PROCEDURE — 99239 HOSP IP/OBS DSCHRG MGMT >30: CPT | Performed by: FAMILY MEDICINE

## 2021-07-15 RX ORDER — CETIRIZINE HYDROCHLORIDE 10 MG/1
10 TABLET ORAL DAILY
Start: 2021-07-16

## 2021-07-15 RX ORDER — OLANZAPINE 5 MG/1
5 TABLET ORAL NIGHTLY
Start: 2021-07-15

## 2021-07-15 RX ORDER — PAROXETINE HYDROCHLORIDE 20 MG/1
20 TABLET, FILM COATED ORAL DAILY
Start: 2021-07-16

## 2021-07-15 RX ORDER — DIPHENOXYLATE HYDROCHLORIDE AND ATROPINE SULFATE 2.5; .025 MG/1; MG/1
1 TABLET ORAL DAILY
Qty: 30 TABLET
Start: 2021-07-16

## 2021-07-15 RX ORDER — ATORVASTATIN CALCIUM 40 MG/1
40 TABLET, FILM COATED ORAL NIGHTLY
Start: 2021-07-15

## 2021-07-15 RX ORDER — CARVEDILOL 12.5 MG/1
12.5 TABLET ORAL 2 TIMES DAILY WITH MEALS
Start: 2021-07-15

## 2021-07-15 RX ORDER — AMOXICILLIN 250 MG
2 CAPSULE ORAL 2 TIMES DAILY
Start: 2021-07-15

## 2021-07-15 RX ORDER — ASPIRIN 81 MG/1
81 TABLET ORAL DAILY
Start: 2021-07-16

## 2021-07-15 RX ORDER — CLOPIDOGREL BISULFATE 75 MG/1
75 TABLET ORAL DAILY
Qty: 30 TABLET
Start: 2021-07-16

## 2021-07-15 RX ADMIN — PAROXETINE HYDROCHLORIDE 20 MG: 20 TABLET, FILM COATED ORAL at 08:40

## 2021-07-15 RX ADMIN — Medication 1 TABLET: at 08:40

## 2021-07-15 RX ADMIN — INSULIN ASPART 10 UNITS: 100 INJECTION, SOLUTION INTRAVENOUS; SUBCUTANEOUS at 11:31

## 2021-07-15 RX ADMIN — CARVEDILOL 12.5 MG: 6.25 TABLET, FILM COATED ORAL at 08:40

## 2021-07-15 RX ADMIN — CETIRIZINE HYDROCHLORIDE 10 MG: 10 TABLET, FILM COATED ORAL at 08:40

## 2021-07-15 RX ADMIN — FLUTICASONE PROPIONATE 2 SPRAY: 50 SPRAY, METERED NASAL at 08:42

## 2021-07-15 RX ADMIN — CLOPIDOGREL 75 MG: 75 TABLET, FILM COATED ORAL at 08:40

## 2021-07-15 RX ADMIN — ASPIRIN 81 MG: 81 TABLET, COATED ORAL at 08:40

## 2021-07-15 RX ADMIN — INSULIN ASPART 10 UNITS: 100 INJECTION, SOLUTION INTRAVENOUS; SUBCUTANEOUS at 08:40

## 2021-07-15 RX ADMIN — DOCUSATE SODIUM 50 MG AND SENNOSIDES 8.6 MG 2 TABLET: 8.6; 5 TABLET, FILM COATED ORAL at 08:40

## 2021-07-15 NOTE — PROGRESS NOTES
Patient Assessment Instrument  Quality Indicators - Discharge    Section GG. Self-Care Performance      Section GG. Mobility Performance      Section J. Health Conditions Discharge      Section M. Skin Conditions Discharge  Unhealed Pressure Ulcer(s)/Injurie(s) at Stage 1 or Higher:  No    . Current Number of Unhealed Pressure Ulcers      Section N. Medication    Signed by: Claudia Allred Nurse

## 2021-07-15 NOTE — THERAPY TREATMENT NOTE
Inpatient Rehabilitation - Speech Language Pathology Treatment Note  [facility]     Patient Name: Chance Lopez  : 1955  MRN: [id number]  Today's Date: 7/15/2021             Admit Date: 2021     Chance Lopez was seen this am in patient room for continued speech/language/cognitive therapy.  He is pleasant and interactive to participate in all tx tasks.        Long Term Goal:  Pt will demonstrate wfl speech language skills in all activities and w/ all communication partners to complete adls.      Short Term Goals:  1. Pt will recall various pictures/items to increase STM in 3/5 opp over 3 consecutive sessions w/ min cues.   * Not specifically addressed during this session     2. Pt will recall personal LTM information in 3/5 opp over 3 consecutive sessions w/ min cues.   * 3/5 opp w/ min cues related to personal information and family member names.     3. Pt will complete multi-step directives tasks related to adls in 3/5 opp over 3 consecutive sessions w/ min cues.   * Pt able to follow 3-step directives w/ min cues.       4. Pt will perform sequencing tasks related to adls in 3/5 opp over 3 consecutive sessions w/ min cues.   * Pt requires mod-max cues to complete sequencing tasks of adls of 4 steps w/ repetitions and reminders of previously completed steps.       5. Pt will perform convergent naming tasks w/ min cues in 3/5 opp over 3 consecutive sessions.   * Pt is able to perform convergent tasks receptively w/ sorting pictures into three groups.      6. Pt will perform divergent naming tasks w/ min cues in approx 1 min in 3/5 opp over 3 consecutive sessions.   *  Named 2/5 independently, 3/5 w/ min cues.        7. Pt will perform graphic tasks related to adls w/ min cues in 3/5 opp over 3 consecutive sessions.   * Not specifically addressed on this date.      8. Pt will demonstrate topic maintenance across session w/ min cues in 3/5 opp over 3 consecutive sessions.    * Mr. Lopez maintained topics w/o cues  and in appropriate contexts for approximatley 5 minutes each topic. He independently transitions appropriately to new topic.     9. Pt will label items/pictures presented in 3/5 opp to decrease anomia w/ min cues over 3 consecutive sessions.   * GOAL MET      10. Pt will respond to simple/moderate OE questions pertaining to adls w/ moderate cues 3/5 opp over 3 consecutive sessions.   * GOAL MET       ----------------------------------------------------------------------------------------------------      DYSPHAGIA THERAPY PLAN OF CARE:     Chance Lopez was seen this am in speech therapy office for formal therapy.      Long Term Goal:  Pt will accept least restrictive diet tolerance w/o overt s/s aspiration.      Short Term Goals:     1. Pt will perform OROM/SHANA exercises x3 sets x10 reps w/ min cues.  * GOAL MET     2. Pt will perform compensatory techniques during meals w/ min cues.  * GOAL MET           Thank you-  Claribel Lovell M.S., CFY-SLP     Visit Dx:  No diagnosis found.  Patient Active Problem List   Diagnosis   • Colon cancer screening   • Gastritis   • CVA (cerebral vascular accident) (CMS/HCC)     Past Medical History:   Diagnosis Date   • Diabetes mellitus (CMS/HCC)    • Diarrhea    • Full dentures     INSTRUCTED NO ADHESIVES THE DOS. REPORTS USUALLY ONLY WEARS UPPER PLATE.   • Hearing loss     REPORTS MORE LOSS ON LEFT SIDE BUT THAT HE HAS BILATERAL LOSS. NO USE OF HEARING AIDS.   • Hepatitis     REPORTS AS A CHILD.  UNSURE IF TYPE A OR B.   • History of acute pancreatitis     REPORTS PRIOR TO 2000   • History of gout    • Hypertension    • Seasonal allergies    • Stroke (CMS/HCC)    • Wears glasses     RX GLASSES     Past Surgical History:   Procedure Laterality Date   • COLONOSCOPY N/A 11/26/2018    Procedure: COLONOSCOPY;  Surgeon: Ha Sykes MD;  Location: [facility] ENDOSCOPY;  Service: Gastroenterology   • ENDOSCOPY N/A 11/26/2018    Procedure: ESOPHAGOGASTRODUODENOSCOPY with cold forcep biopsy;   Surgeon: Ha Sykes MD;  Location: [facility] ENDOSCOPY;  Service: Gastroenterology   • TOOTH EXTRACTION        EDUCATION  The patient has been educated in the following areas:     Cognitive Impairment Communication Impairment.    Impression: Mr. Lopez continues to make progress towards his goals.     SLP Recommendation and Plan   Continue current POC to allow for maximal opportunities for improvement.     Thank you   Claribel Lovell M.S., CFY-SLP                   Time Calculation:     Time Calculation- SLP     Row Name 07/15/21 1450             Time Calculation- SLP    SLP Start Time  0915  -      SLP Stop Time  0950  -      SLP Time Calculation (min)  35 min  -        User Key  (r) = Recorded By, (t) = Taken By, (c) = Cosigned By    Initials Name Provider Type    Claribel Correa MS, CFY-SLP Speech and Language Pathologist          Therapy Charges for Today     Code Description Service Date Service Provider Modifiers Qty    35243490938 HC ST TREATMENT SPEECH 3 7/14/2021 Claribel Lovell MS, CFY-SLP GN 1    47090959342 HC ST TREATMENT SPEECH 3 7/14/2021 Claribel Lovell MS, CFY-SLP GN 1    28009756443 HC ST TREATMENT SPEECH 2 7/15/2021 Claribel Lovell MS, CFY-SLP GN 1                     Claribel Lovell MS, CFY-SLP  7/15/2021

## 2021-07-15 NOTE — SIGNIFICANT NOTE
07/15/21 1452   PT Discharge Summary   Reason for Discharge Discharge from facility   Outcomes Achieved Patient able to partially acheive established goals   Discharge Destination SNF

## 2021-07-15 NOTE — PROGRESS NOTES
Occupational Therapy:    Physical Therapy: Individual: 15 minutes.    Speech Language Pathology:    Signed by: Robin Johnson PTA

## 2021-07-15 NOTE — THERAPY DISCHARGE NOTE
Inpatient Rehabilitation - Physical Therapy Treatment Note/Discharge   [facility]     Patient Name: Chance Lopez  : 1955  MRN: [id number]  Today's Date: 7/15/2021                Admit Date: 2021    Visit Dx:  No diagnosis found.  Patient Active Problem List   Diagnosis   • Colon cancer screening   • Gastritis   • CVA (cerebral vascular accident) (CMS/HCC)     Past Medical History:   Diagnosis Date   • Diabetes mellitus (CMS/HCC)    • Diarrhea    • Full dentures     INSTRUCTED NO ADHESIVES THE DOS. REPORTS USUALLY ONLY WEARS UPPER PLATE.   • Hearing loss     REPORTS MORE LOSS ON LEFT SIDE BUT THAT HE HAS BILATERAL LOSS. NO USE OF HEARING AIDS.   • Hepatitis     REPORTS AS A CHILD.  UNSURE IF TYPE A OR B.   • History of acute pancreatitis     REPORTS PRIOR TO    • History of gout    • Hypertension    • Seasonal allergies    • Stroke (CMS/HCC)    • Wears glasses     RX GLASSES     Past Surgical History:   Procedure Laterality Date   • COLONOSCOPY N/A 2018    Procedure: COLONOSCOPY;  Surgeon: Ha Sykes MD;  Location: [facility] ENDOSCOPY;  Service: Gastroenterology   • ENDOSCOPY N/A 2018    Procedure: ESOPHAGOGASTRODUODENOSCOPY with cold forcep biopsy;  Surgeon: Ha Sykes MD;  Location: [facility] ENDOSCOPY;  Service: Gastroenterology   • TOOTH EXTRACTION            PT ASSESSMENT (last 12 hours)      IRF PT Evaluation and Treatment     Row Name 07/15/21 1451          PT Time and Intention    Document Type  discharge evaluation AM session  -RG     Mode of Treatment  individual therapy;physical therapy  -RG     Patient/Family/Caregiver Comments/Observations  Pt DC to SNF  -RG     Row Name 07/15/21 1451          Transfers    Sit-Stand [place] (Transfers)  standby assist  -RG     Stand-Sit [place] (Transfers)  verbal cues;standby assist  -RG     Row Name 07/15/21 1451          Car Transfer    Type (Car Transfer)  stand pivot/stand step  -RG     [place] Level (Car Transfer)   contact guard;supervision  -RG     Row Name 07/15/21 1451          Gait/Stairs (Locomotion)    [place] Level (Gait)  contact guard  -RG     Assistive Device (Gait)  -- None  -RG     Deviations/Abnormal Patterns (Gait)  stride length decreased;steppage;weight shifting decreased  -RG     [place] Level (Stairs)  contact guard  -RG     Handrail Location (Stairs)  both sides  -RG     Ascending Technique (Stairs)  step-over-step  -RG     Descending Technique (Stairs)  step-over-step  -RG     Stairs, Impairments  motor control impaired;coordination impaired  -RG     Row Name 07/15/21 1451          Bed Mobility Goal 1 (PT-IRF)    Progress/Outcomes (Bed Mobility Goal 1, PT-IRF)  goal partially met  -RG     Row Name 07/15/21 1451          Transfer Goal 1 (PT-IRF)    Progress/Outcomes (Transfer Goal 1, PT-IRF)  goal met  -RG     Row Name 07/15/21 1451          Gait/Walking Locomotion Goal 1 (PT-IRF)    Progress/Outcomes (Gait/Walking Locomotion Goal 1, PT-IRF)  goal met  -RG       User Key  (r) = Recorded By, (t) = Taken By, (c) = Cosigned By    Initials Name Provider Type    Robin Pinon PTA Physical Therapy Assistant          Physical Therapy Education                 Title: PT OT SLP Therapies (Done)     Topic: Physical Therapy (Done)     Point: Mobility training (Done)     Learning Progress Summary           Patient Acceptance, E,D, VU,NR by RG at 7/14/2021 1423    Acceptance, E,D, VU by LL at 7/14/2021 1026    Acceptance, E,D, VU,NR by RG at 7/13/2021 1431    Acceptance, E,TB, VU by DG at 7/12/2021 2205    Acceptance, E,D, VU,NR by RG at 7/12/2021 1440    Acceptance, E,D, VU,NR by RG at 7/8/2021 1442    Acceptance, E,D, VU,NR by RG at 7/7/2021 1519    Acceptance, E,D, VU,NR by RG at 7/7/2021 1517    Acceptance, E,TB, VU by RF at 7/6/2021 1545    Acceptance, E,TB, VU by RF at 7/5/2021 1613    Acceptance, E, VU,NR by LB at 7/3/2021 1357    Acceptance, E, VU,NR by LB at 7/2/2021 1524                   Point:  Home exercise program (Done)     Learning Progress Summary           Patient Acceptance, E,D, VU,NR by RG at 7/14/2021 1423    Acceptance, E,D, VU by LL at 7/14/2021 1026    Acceptance, E,D, VU,NR by RG at 7/13/2021 1431    Acceptance, E,TB, VU by DG at 7/12/2021 2205    Acceptance, E,D, VU,NR by RG at 7/12/2021 1440    Acceptance, E,D, VU,NR by RG at 7/8/2021 1442    Acceptance, E,D, VU,NR by RG at 7/7/2021 1519    Acceptance, E,D, VU,NR by RG at 7/7/2021 1517    Acceptance, E,TB, VU by RF at 7/6/2021 1545    Acceptance, E,TB, VU by RF at 7/5/2021 1613    Acceptance, E, VU,NR by LB at 7/3/2021 1357    Acceptance, E, VU,NR by LB at 7/2/2021 1524                   Point: Body mechanics (Done)     Learning Progress Summary           Patient Acceptance, E,D, VU,NR by RG at 7/14/2021 1423    Acceptance, E,D, VU by LL at 7/14/2021 1026    Acceptance, E,D, VU,NR by RG at 7/13/2021 1431    Acceptance, E,TB, VU by DG at 7/12/2021 2205    Acceptance, E,D, VU,NR by RG at 7/12/2021 1440    Acceptance, E,D, VU,NR by RG at 7/8/2021 1442    Acceptance, E,D, VU,NR by RG at 7/7/2021 1519    Acceptance, E,D, VU,NR by RG at 7/7/2021 1517    Acceptance, E,TB, VU by RF at 7/6/2021 1545    Acceptance, E,TB, VU by RF at 7/5/2021 1613    Acceptance, E, VU,NR by LB at 7/3/2021 1357    Acceptance, E, VU,NR by LB at 7/2/2021 1524                   Point: Precautions (Done)     Learning Progress Summary           Patient Acceptance, E,D, VU,NR by RG at 7/14/2021 1423    Acceptance, E,D, VU by LL at 7/14/2021 1026    Acceptance, E,D, VU,NR by RG at 7/13/2021 1431    Acceptance, E,TB, VU by DG at 7/12/2021 2205    Acceptance, E,D, VU,NR by RG at 7/12/2021 1440    Acceptance, E,D, VU,NR by RG at 7/8/2021 1442    Acceptance, E,D, VU,NR by RG at 7/7/2021 1519    Acceptance, E,D, VU,NR by RG at 7/7/2021 1517    Acceptance, E,TB, VU by RF at 7/6/2021 1545    Acceptance, E,TB, VU by RF at 7/5/2021 1613    Acceptance, E, VU,NR by LB at 7/3/2021 1357     Acceptance, E, VU,NR by  at 7/2/2021 1524                               User Key     Initials Effective Dates Name Provider Type Discipline    DG 06/16/21 -  Ramonita Duke RN Registered Nurse Nurse    LB 06/16/21 -  Linda Rodríguez, PT Physical Therapist PT    LL 05/02/16 -  Crystal Givens PTA Physical Therapy Assistant PT    RF 06/16/21 -  Nuris Mccollum PTA Physical Therapy Assistant PT    RG 06/16/21 -  Robin Johnson PTA Physical Therapy Assistant PT                PT Recommendation and Plan        Daily Progress Summary (PT)  Impairments Still Limiting Function (PT): strength decreased, coordination impaired, impaired functional activity tolerance, motor control impaired         Time Calculation:   PT Charges     Row Name 07/15/21 1453             Time Calculation    Start Time  1130  -RG      Stop Time  1145  -RG      Time Calculation (min)  15 min  -RG      PT Received On  07/15/21  -RG         Time Calculation- PT    Total Timed Code Minutes- PT  15 minute(s)  -RG        User Key  (r) = Recorded By, (t) = Taken By, (c) = Cosigned By    Initials Name Provider Type    RG Robin Johnson PTA Physical Therapy Assistant          Therapy Charges for Today     Code Description Service Date Service Provider Modifiers Qty    43705176135 HC GAIT TRAINING EA 15 MIN 7/14/2021 Robin Johnson PTA GP 1    62494882759 HC PT THERAPEUTIC ACT EA 15 MIN 7/14/2021 Robin Johnson PTA GP 1    40400739077 HC PT THER PROC EA 15 MIN 7/14/2021 Robin Johnson PTA GP 1    80636241323 HC PT THERAPEUTIC ACT EA 15 MIN 7/15/2021 Robin Johnson PTA GP 1               PT Discharge Summary  Reason for Discharge: Discharge from facility  Outcomes Achieved: Patient able to partially acheive established goals  Discharge Destination: SNF    Robin Johnson PTA  7/15/2021

## 2021-07-15 NOTE — THERAPY DISCHARGE NOTE
Inpatient Rehabilitation - Occupational Therapy Discharge Summary   [facility]     Patient Name: Chance Lopez  : 1955  MRN: [id number]    Today's Date: 7/15/2021                 Admit Date: 2021        OT Recommendation and Plan    Visit Dx:  No diagnosis found.            OT IRF GOALS     Row Name 21 1400 21 1400          LB Dressing Goal 1 (OT-IRF)    Activity/Device (LB Dressing Goal 1, OT-IRF)  --  lower body dressing  -AH     [place] (LB Dressing Goal 1, OT-IRF)  --  verbal cues required;supervision required;set-up required  -AH     Progress/Outcomes (LB Dressing Goal 1, OT-IRF)  goal met  -AH  --        Toileting Goal 1 (OT-IRF)    Activity/Device (Toileting Goal 1, OT-IRF)  --  toileting skills, all  -AH     [place] Level (Toileting Goal 1, OT-IRF)  --  verbal cues required;minimum assist (75% or more patient effort)  -AH     Progress/Outcomes (Toileting Goal 1, OT-IRF)  goal met  -AH  --        Toileting Goal 2 (OT-IRF)    Activity/Device (Toileting Goal 2, OT-IRF)  --  toileting skills, all  -AH     [place] Level (Toileting Goal 2, OT-IRF)  --  set-up required;supervision required  -AH     Progress/Outcomes (Toileting Goal 2, OT-IRF)  goal met;goal partially met  -AH  --       User Key  (r) = Recorded By, (t) = Taken By, (c) = Cosigned By    Initials Name Provider Type     Ambreen Marinelli OT Occupational Therapist              Therapy Charges for Today     Code Description Service Date Service Provider Modifiers Qty    30166104387  OT THERAPEUTIC ACT EA 15 MIN 2021 Ambreen Marinelli OT GO 2    64868325625 HC OT SELF CARE/MGMT/TRAIN EA 15 MIN 2021 Ambreen Marinelli OT GO 2    [id number]  OT THER PROC EA 15 MIN 2021 Ambreen Marinelli OT GO 2          OT Discharge Summary  Anticipated Discharge Disposition (OT): skilled nursing facility  Reason for Discharge: Discharge from facility  Discharge Destination: SNF      Ambreen Ashley  [place], OT  7/15/2021    Patient is a 66y old  Female who presents with a chief complaint of  R Hip Pain- failed CRPP R Femoral Fx with OA

## 2021-07-15 NOTE — PROGRESS NOTES
Patient Assessment Instrument  Quality Indicators - Discharge    Section GG. Self-Care Performance      Section GG. Mobility Performance     Roll Left and Right: Patient completed the activities by him/herself with no  assistance from a helper.   Sit to Lying: [place] sets up or cleans up; patient completes activity. [place]  assists only prior to or following the activity.   Lying to Sitting on Side of Bed: [place] sets up or cleans up; patient completes  activity. [place] assists only prior to or following the activity.   Sit to Stand: [place] sets up or cleans up; patient completes activity. [place]  assists only prior to or following the activity.   Chair/Bed to Chair Transfer: [place] provides verbal cues and/or  touching/steadying and/or contact guard assistance as patient completes  activity. Assistance may be provided throughout the activity or intermittently.   Toilet Transfer [place] provides verbal cues and/or touching/steadying and/or  contact guard assistance as patient completes activity. Assistance may be  provided throughout the activity or intermittently.   Car Transfer: [place] provides verbal cues and/or touching/steadying and/or  contact guard assistance as patient completes activity. Assistance may be  provided throughout the activity or intermittently.   Walk 10 Feet:   [place] provides verbal cues and/or touching/steadying and/or  contact guard assistance as patient completes activity. Assistance may be  provided throughout the activity or intermittently.  Walk 50 Feet with 2 Turns:   [place] provides verbal cues and/or  touching/steadying and/or contact guard assistance as patient completes  activity. Assistance may be provided throughout the activity or intermittently.  Walk 150 Feet:   [place] provides verbal cues and/or touching/steadying and/or  contact guard assistance as patient completes activity. Assistance may be  provided throughout the activity or intermittently.  Walking 10 Feet on Uneven  Surfaces:   [place] provides verbal cues and/or  touching/steadying and/or contact guard assistance as patient completes  activity. Assistance may be provided throughout the activity or intermittently.  1 Step Over Curb or Up/Down Stair:   [place] provides verbal cues and/or  touching/steadying and/or contact guard assistance as patient completes  activity. Assistance may be provided throughout the activity or intermittently.  4 Steps Up and Down, With/Without Rail:   [place] provides verbal cues and/or  touching/steadying and/or contact guard assistance as patient completes  activity. Assistance may be provided throughout the activity or intermittently.  12 Steps Up and Down, With/Without Rail:   [place] provides verbal cues and/or  touching/steadying and/or contact guard assistance as patient completes  activity. Assistance may be provided throughout the activity or intermittently.  Picking up an Object:   [place] provides verbal cues and/or touching/steadying  and/or contact guard assistance as patient completes activity. Assistance may be  provided throughout the activity or intermittently. Uses Wheelchair and/or  Scooter: Yes  Wheel 50 Feet with Two Turns: Patient completed the activities by him/herself  with no assistance from a helper.  Type of Wheelchair or Scooter Used: Manual  Wheel 150 Feet: Patient completed the activities by him/herself with no  assistance from a helper.  Type of Wheelchair or Scooter Used: Manual    Section J. Health Conditions Discharge      Section M. Skin Conditions Discharge      . Current Number of Unhealed Pressure Ulcers      Section N. Medication    Signed by: Robin Johnson PTA

## 2021-07-15 NOTE — PROGRESS NOTES
Occupational Therapy:    Physical Therapy:    Speech Language Pathology: Individual: 85 minutes.    Signed by: PABLO Leon

## 2021-07-15 NOTE — SIGNIFICANT NOTE
07/15/21 1030   Plan   Plan Spoke to son Ulysses [phone] about pt being discharged to [facility] today.  Son says he or pt's son Obi will come to rehab around 3:00 pm to transport pt to [facility].  Spoke to Therese at [facility] [phone] about discharge and one of his sons to provide transportation to their facility after 3:00 pm.  Faxed NH orders, AVS, and discharge summary to [facility] [phone].  NH packet complete.   Patient/Family in Agreement with Plan yes

## 2021-07-15 NOTE — PROGRESS NOTES
Occupational Therapy:    Physical Therapy:    Speech Language Pathology: Individual: 35 minutes.    Signed by: PABLO Leon

## 2021-07-15 NOTE — DISCHARGE SUMMARY
[facility] REHAB DISCHARGE SUMMARY    Patient Identification:  Name:  Chance Lopez  Age:  65 y.o.  Sex:  male  :  1955  MRN:  [id number]  Visit Number:  [id number]    Date of Admission: 2021  Date of Discharge:  7/15/2021     PCP: Ramos Gurrola MD    DISCHARGE DIAGNOSIS  Status post CVA with bihemispheric MCA infarctions  Encephalopathy while at --has been well controlled while in rehab.  Diabetes mellitus  Hypertension  Acute kidney injury  Short-term memory deficit; suspect underlying dementia versus vascular dementia    CONSULTS       PROCEDURES PERFORMED      HOSPITAL COURSE  Patient is a 65 y.o. male presented to [facility] inpatient rehab as a transfer from the [facility].  Patient 65-year-old gentleman with past history of diabetes mellitus, hypertension, remote history of alcoholism, pancreatitis that is remote history.  Patient presented to [facility] in [place] with acute confusion of 2-day duration.  Patient MRI performed which showed acute/subacute infarct in left periventricular occipital lobe without any evidence of hemorrhage.  EEG did show slow frequencies bilaterally consistent with cerebral dysfunction.  Patient was initially evaluated and treated as if he could have possible ethanol withdrawal, was ruled out for meningitis.  Patient and had some agitation difficulty eating before coming into the hospital apparently.  Is also had memory issues approximately 1 month prior to admission.  In any event patient was transferred to [facility] where on further evaluation patient has stenosis of the left MCA segment.  Neurosurgery did evaluate patient because of abnormal cerebral angiogram and they felt patient benefit best from medical management.  Patient was also evaluated by psychiatry during his time at  because of possible catatonia.  Patient also had acute kidney injury which did improve throughout his admission.  Patient did  reasonably well and it was thought it would be a good candidate for inpatient rehab.      At time of admission patient was evaluated by speech therapy and they did remake recommend mechanical soft solids, chopped meats, thin liquids.  Patient was also evaluated and treated during the admission by speech therapy for cognitive dysfunction.  At time of admission, patient did require minimum assistance for transfers; moderate assistance with bathing; minimum assistance for upper body dressing; moderate assistance for lower body dressing; supervision for grooming; moderate assistance for toileting.  Patient was able to ambulate 160 feet x 2 twice a day with a front wheel walker at the time of admission as well.    At time of discharge, patient required supervision for upper lower body dressing, grooming, toileting, and transfers.  Patient able to walk 320 feet with contact-guard.  Patient is continue to work with speech therapy and has improved with cognition but still does have significant memory short-term memory deficit.  Patient was able to be advanced to regular consistency diet during the admission.    Bihemispheric CVA--patient has been stable throughout the admission and have continued medical management including aspirin, Lipitor, Plavix.    Patient does have loop recorder in place which should stay in place through the end of July.  Patient will follow up with [facility] cardiology Dr. Wren 7 in August.    Diabetes mellitus has been reasonably well controlled    History of encephalopathy while at [facility]--patient has been on Zyprexa and Paxil.  Patient has done very well here and is not had any difficulties with agitation or delirium during the admission.        VITAL SIGNS:  Temp:  [98.3 °F (36.8 °C)] 98.3 °F (36.8 °C)  Heart Rate:  [70-75] 75  Resp:  [16] 16  BP: (129-136)/(72-76) 129/76  SpO2:  [98 %] 98 %  on   ;   Device (Oxygen Therapy): room air    Body mass index is 24.39 kg/m².  Wt Readings  from Last 3 Encounters:   07/01/21 79.3 kg (174 lb 13.2 oz)   01/04/19 83.6 kg (184 lb 3.2 oz)   12/14/18 83.9 kg (184 lb 15.5 oz)       PHYSICAL EXAM:  Constitutional: No acute distress  HEENT: Normocephalic atraumatic  Neck:   Supple  Cardiovascular: Regular rate and rhythm  Pulmonary/Chest: Clear to auscultation  Abdominal: Positive bowel sounds soft.   Musculoskeletal: No arthropathy  Neurological: No focal deficits; does have short-term memory deficiency  Skin: No rash  Peripheral vascular:  Genitourinary::    DISCHARGE DISPOSITION   Stable    DISCHARGE MEDICATIONS:     Discharge Medications      New Medications      Instructions Start Date   aspirin 81 MG EC tablet   81 mg, Oral, Daily   Start Date: July 16, 2021     atorvastatin 40 MG tablet  Commonly known as: LIPITOR   40 mg, Oral, Nightly      carvedilol 12.5 MG tablet  Commonly known as: COREG   12.5 mg, Oral, 2 Times Daily With Meals      cetirizine 10 MG tablet  Commonly known as: zyrTEC   10 mg, Oral, Daily   Start Date: July 16, 2021     clopidogrel 75 MG tablet  Commonly known as: PLAVIX   75 mg, Oral, Daily   Start Date: July 16, 2021     multivitamin tablet tablet   1 tablet, Oral, Daily   Start Date: July 16, 2021     OLANZapine 5 MG tablet  Commonly known as: zyPREXA   5 mg, Oral, Nightly      PARoxetine 20 MG tablet  Commonly known as: PAXIL   20 mg, Oral, Daily   Start Date: July 16, 2021     sennosides-docusate 8.6-50 MG per tablet  Commonly known as: PERICOLACE   2 tablets, Oral, 2 Times Daily         Changes to Medications      Instructions Start Date   insulin aspart 100 UNIT/ML injection  Commonly known as: novoLOG  What changed: Another medication with the same name was removed. Continue taking this medication, and follow the directions you see here.   5 Units, Subcutaneous, Daily With Dinner      insulin detemir 100 UNIT/ML injection  Commonly known as: LEVEMIR  What changed:   · how much to take  · when to take this   15 Units,  Subcutaneous, Nightly         Continue These Medications      Instructions Start Date   fluticasone 50 MCG/ACT nasal spray  Commonly known as: FLONASE   1 spray, Nasal, Daily PRN         Stop These Medications    gabapentin 600 MG tablet  Commonly known as: NEURONTIN     hydroCHLOROthiazide 25 MG tablet  Commonly known as: HYDRODIURIL     loratadine 10 MG tablet  Commonly known as: CLARITIN     metoprolol tartrate 25 MG tablet  Commonly known as: LOPRESSOR     pravastatin 10 MG tablet  Commonly known as: PRAVACHOL     tiZANidine 4 MG tablet  Commonly known as: ZANAFLEX             Your medication list      START taking these medications      Instructions Last Dose Given Next Dose Due   aspirin 81 MG EC tablet  Start taking on: July 16, 2021      Take 1 tablet by mouth Daily.       atorvastatin 40 MG tablet  Commonly known as: LIPITOR      Take 1 tablet by mouth Every Night.       carvedilol 12.5 MG tablet  Commonly known as: COREG      Take 1 tablet by mouth 2 (Two) Times a Day With Meals.       cetirizine 10 MG tablet  Commonly known as: zyrTEC  Start taking on: July 16, 2021      Take 1 tablet by mouth Daily.       clopidogrel 75 MG tablet  Commonly known as: PLAVIX  Start taking on: July 16, 2021      Take 1 tablet by mouth Daily.       multivitamin tablet tablet  Start taking on: July 16, 2021      Take 1 tablet by mouth Daily.       OLANZapine 5 MG tablet  Commonly known as: zyPREXA      Take 1 tablet by mouth Every Night.       PARoxetine 20 MG tablet  Commonly known as: PAXIL  Start taking on: July 16, 2021      Take 1 tablet by mouth Daily.       sennosides-docusate 8.6-50 MG per tablet  Commonly known as: PERICOLACE      Take 2 tablets by mouth 2 (Two) Times a Day.          CHANGE how you take these medications      Instructions Last Dose Given Next Dose Due   insulin aspart 100 UNIT/ML injection  Commonly known as: novoLOG  What changed: Another medication with the same name was removed. Continue taking  this medication, and follow the directions you see here.      Inject 5 Units under the skin into the appropriate area as directed Daily With Dinner.       insulin detemir 100 UNIT/ML injection  Commonly known as: LEVEMIR  What changed:   · how much to take  · when to take this      Inject 15 Units under the skin into the appropriate area as directed Every Night.          CONTINUE taking these medications      Instructions Last Dose Given Next Dose Due   fluticasone 50 MCG/ACT nasal spray  Commonly known as: FLONASE      1 spray into the nostril(s) as directed by provider Daily As Needed for Rhinitis or Allergies.          STOP taking these medications    gabapentin 600 MG tablet  Commonly known as: NEURONTIN        hydroCHLOROthiazide 25 MG tablet  Commonly known as: HYDRODIURIL        loratadine 10 MG tablet  Commonly known as: CLARITIN        metoprolol tartrate 25 MG tablet  Commonly known as: LOPRESSOR        pravastatin 10 MG tablet  Commonly known as: PRAVACHOL        tiZANidine 4 MG tablet  Commonly known as: ZANAFLEX              Where to Get Your Medications      Information about where to get these medications is not yet available    Ask your nurse or doctor about these medications  · aspirin 81 MG EC tablet  · atorvastatin 40 MG tablet  · carvedilol 12.5 MG tablet  · cetirizine 10 MG tablet  · clopidogrel 75 MG tablet  · insulin detemir 100 UNIT/ML injection  · multivitamin tablet tablet  · OLANZapine 5 MG tablet  · PARoxetine 20 MG tablet  · sennosides-docusate 8.6-50 MG per tablet         Diet Instructions     Diet: Consistent Carbohydrate, Cardiac; Thin      Discharge Diet:  Consistent Carbohydrate  Cardiac       Fluid Consistency: Thin        No future appointments.  Additional Instructions for the Follow-ups that You Need to Schedule     Discharge Follow-up with Specified Provider: Dr [facility]ner at [facility] on 10/15/21 at 130pm   As directed      To: Dr Joyner at [facility] on 10/15/21 at 130pm    Follow Up Details: cva         Discharge Follow-up with Specified Provider: Dr Obrien--8/26/21 at 1030am at [facility] office   As directed      To: Dr Obrien--8/26/21 at 1030am at [facility] office            Contact information for follow-up providers     Ramos Gurrola MD Follow up.    Specialty: Family Medicine  Contact information:  [address]  [place] [address]  [phone]             Cristobal Obrien MD. Go on 8/26/2021.    Specialty: Cardiology  Why: 10:30 AM in [facility] OFFICE.  Contact information:  [address]  [address]  [facility] [address]  [phone]             [facility] [place]. Go on 10/15/2021.    Why: at 1:30 PM with Dr. Herman Joyner.  Contact information:  [address]  [facility] [address]  [phone]                 Contact information for after-discharge care     Destination     [facility] .    Service: Skilled Nursing  Contact information:  [address] [place] [facility] [address]  [phone]                              TEST  RESULTS PENDING AT DISCHARGE       CODE STATUS  Code Status and Medical Interventions:   Ordered at: 07/01/21 1835     Level Of Support Discussed With:    Patient     Code Status:    CPR     Medical Interventions (Level of Support Prior to Arrest):    Full       Chance Underwood MD  [facility]  07/15/21  09:34 EDT    Please note that this discharge summary required more than 30 minutes to complete.

## 2021-07-15 NOTE — SIGNIFICANT NOTE
07/15/21 1105   Plan   Plan Informed pt about discharge to [facility] today for continued rehab.  Informed him one of his sons will come to rehab around 3:00 pm to transport him there.  Pt is agreeable.   Patient/Family in Agreement with Plan yes

## 2021-07-15 NOTE — SIGNIFICANT NOTE
07/15/21 1030   Plan   Plan Spoke to son Ulysses [phone] about pt being discharged to [facility] today.  Son says he or pt's son Obi will come to rehab around 3:00 pm to transport pt to [facility].  Spoke to Therese at [facility] [phone] about discharge and one of his sons to provide transportation to their facility after 3:00 pm.  Pt does not require COVID-19 test since he has received COVID 19 vaccination and will not be placed in quarantine.  Faxed NH orders, AVS, and discharge summary to [facility] [phone].  NH packet complete.   Patient/Family in Agreement with Plan yes

## 2021-07-15 NOTE — SIGNIFICANT NOTE
07/15/21 0941   Plan   Plan Spoke to Ashley at [facility] [phone] about pt being discharged today and son to come to rehab around 3:00 pm to provide transportation to NH.  Pt will be admitted to skilled bed at NH.  Nurse to call report to NH.   Final Discharge Disposition Code 03 - skilled nursing facility (SNF)

## 2021-07-15 NOTE — PROGRESS NOTES
Patient Assessment Instrument  Quality Indicators - Discharge    Section GG. Self-Care Performance     Eating: [place] sets up or cleans up; patient completes activity. [place] assists  only prior to or following the activity.   Oral Hygiene: [place] sets up or cleans up; patient completes activity. [place]  assists only prior to or following the activity.   Toileting Hygiene: : [place] provides verbal cues and/or touching/steadying  and/or contact guard assistance as patient completes activity.   Shower/Bathe Self: [place] provides verbal cues and/or touching/steadying and/or  contact guard assistance as patient completes activity.   Upper Body Dressing: [place] provides verbal cues and/or touching/steadying  and/or contact guard assistance as patient completes activity.   Lower Body Dressing: [place] provides verbal cues and/or touching/steadying  and/or contact guard assistance as patient completes activity.   Putting On/Taking Off Footwear: [place] provides verbal cues and/or  touching/steadying and/or contact guard assistance as patient completes  activity.    Section GG. Mobility Performance      Section J. Health Conditions Discharge      Section M. Skin Conditions Discharge      . Current Number of Unhealed Pressure Ulcers      Section N. Medication    Signed by: Gabi Marinelli, Occupational Therapist

## 2021-07-16 NOTE — PROGRESS NOTES
Patient Assessment Instrument  Quality Indicators - Discharge    Section GG. Self-Care Performance      Section GG. Mobility Performance      Section J. Health Conditions Discharge  Fall(s) Since Admission:  No    Section M. Skin Conditions Discharge      . Current Number of Unhealed Pressure Ulcers      Section N. Medication    Medication Intervention: N/A - There were no potential clinically significant  medication issues identified since admission or patient is not taking any  medications.    Signed by: Samantha Springer, Supervisor

## 2021-07-16 NOTE — PROGRESS NOTES
Occupational Therapy:    Physical Therapy:    Speech Language Pathology: Individual: 40 minutes.    Signed by: Samantha Springer, Supervisor

## 2021-07-16 NOTE — PROGRESS NOTES
Occupational Therapy:    Physical Therapy:    Speech Language Pathology: Individual: 20 minutes.    Signed by: Samantha Springer, Supervisor

## 2021-07-20 NOTE — PROGRESS NOTES
PPS CMG Coordinator  Inpatient Rehabilitation Discharge    Mode of Locomotion: Walking.    Discharge Against Medical Advice:  No.  Discharge Information  Patient Discharged Alive:  Yes  Discharge Destination/Living Setting: Skilled Nursing Facility  Diagnosis for Interruption/Death:    Impairment Group: Stroke: 01.9 Other Stroke    Comorbidities: Rank Code      Description      1    N17.9     Acute kidney failure, unspecified  2    G93.40    Encephalopathy, unspecified  3    R13.10    Dysphagia, unspecified  4    E11.9     Type 2 diabetes mellitus without complications  5    Z79.4     Long term (current) use of insulin  6    I10       Essential (primary) hypertension  7    H91.90    Unspecified hearing loss, unspecified ear    Complications:      RENETTA Bladder Accidents: 0  - Accidents.  Bladder Score = 1.  Five (5) or more  bladder accidents.  RENETTA Bowel Accident: 0  - Accidents.  Bowel Score = 6. Patient has no accidents, but uses a device/medications.  medication    Signed by: Samantha Springer, Supervisor

## 2022-07-19 NOTE — SIGNIFICANT NOTE
07/07/21 4717   Plan   Plan Spoke to pt about talking to son Ulysses and his family's inability to providing 24 hour assistance.  Discussed going to [facility] for continued rehab if accepted and approved by insurance.  Pt is agreeable with this plan.  Informed pt referral was sent to [facility] and if they can accept him they will request PA from insurance.  Will follow.   Patient/Family in Agreement with Plan yes      Spoke to pharmacy and lvm for pt

## 2022-12-16 NOTE — THERAPY TREATMENT NOTE
Inpatient Rehabilitation - Speech Language Pathology Treatment Note  [facility]     Patient Name: Chance Lopez  : 1955  MRN: [id number]  Today's Date: 2021             Admit Date: 2021     Chance Lopez was seen this am in speech therapy office for continued speech/language/cognitive therapy.  He is pleasant and interactive to participate in all tx tasks.        Long Term Goal:  Pt will demonstrate wfl speech language skills in all activities and w/ all communication partners to complete adls.      Short Term Goals:  1. Pt will recall various pictures/items to increase STM in 3/5 opp over 3 consecutive sessions w/ min cues.   * 5 stimuli objects presented w/ requests to recall. He is able to recall 2 w/o cues independently, 2 w/ min-mod cues, 1 w/ max cues.       2. Pt will recall personal LTM information in 3/5 opp over 3 consecutive sessions w/ min cues.   *Mr. Lopez is able to verbally provide his name and month and date for  independently.  He requires mod cues for year of .  He independently provides two siblings names and parents names.      3. Pt will complete multi-step directives tasks related to adls in 3/5 opp over 3 consecutive sessions w/ min cues.   * Pt able to follow 2-step directives independently.  Pt requires cues of repetitions and reminders of previously completed steps for 3+-step directives.     4. Pt will perform sequencing tasks related to adls in 3/5 opp over 3 consecutive sessions w/ min cues.   * Pt requires mod-max cues to complete sequencing tasks of adls of 3 steps w/ repetitions and reminders of previously completed steps.       5. Pt will perform convergent naming tasks w/ min cues in 3/5 opp over 3 consecutive sessions.   * 2/3 independently. 1/3 w/ max cues.      6. Pt will perform divergent naming tasks w/ min cues in approx 1 min in 3/5 opp over 3 consecutive sessions.   *  not specifically addressed on this date.       7. Pt will perform graphic tasks related to  Physical Therapy  Visit Type: initial evaluation  Precautions:  Medical precautions:  fall risk;. Intermittent difficulty with command following  Lines:     Basic: IV, O2 and continuous pulse oximetry (2L NC, BP cuff)      Lines in chart and on patient reviewed, cautions maintained throughout session.  Safety Measures: bed alarm and chair alarm    SUBJECTIVE  Patient agreed to participate in therapy this date.  Pt states he's feeling weaker, especially in the legs, family reporting of pt being a lot more out of it, less endurance.  Patient / Family Goal: return to previous functional status     OBJECTIVE     Oriented to person, place and situation     Disoriented to time  Strength (out of 5 unless otherwise indicated)   Hip:  Hip Flexion: Left: 4- Right: 4-  Knee:   - Extension:      • Left: 4-      • Right: 4-  Ankle:    - Dorsiflexion:      • Left: 4-      • Right: 4-  Sensation - Lower Extremity  L1 (back, over trochanter and groin):     Light Touch: Left: intact Right: intact  L2 (back, front of thigh to knee):     Light Touch: Left: intact Right: intact  L3 (back, upper buttock, anterior thigh, knee, medial lower leg):     Light Touch: Left: intact Right: intact  L4 (medial buttock, lateral thigh, medial leg, dorsum of foot, great toe):     Light Touch: Left: intact Right: intact  L5 (buttock, posterior and lateral thigh, lateral aspect of leg, dorsum of foot, medial half of sole, 1-3rd toes):     Light Touch: Left: intact Right: intact   S1 (buttock, thigh, posterior leg):     Light Touch: Left: intact Right: intact    Bed Mobility:        Supine to sit: minimal assist  Training completed:    Tasks: all aspects of bed mobility    Education details: body mechanics and patient safety    HOB elevated, bed rails used, increased effort and time, pt log rolling and pushing off bed appropriately for upright trunk, needing min assist at trunk for advancement  Transfers:    Assistive devices: gait belt, 2-wheeled  adls w/ min cues in 3/5 opp over 3 consecutive sessions.   * Pt able to write name independently.      8. Pt will demonstrate topic maintenance across session w/ min cues in 3/5 opp over 3 consecutive sessions.    * Mr. Lopez maintained topics w/o cues and in appropriate contexts for approximatley 5 minutes each topic. He independently transitions appropriately to new topic.     9. Pt will label items/pictures presented in 3/5 opp to decrease anomia w/ min cues over 3 consecutive sessions.   * Labels pictures of common nouns in 5/5 opp independently.       10. Pt will respond to simple/moderate OE questions pertaining to adls w/ moderate cues 3/5 opp over 3 consecutive sessions.   * responds to simple OE questions pertaining to adls w/ mod cues in 2/5 opp.       ----------------------------------------------------------------------------------------------------      DYSPHAGIA THERAPY PLAN OF CARE:     Chance Lopez was seen this am in speech therapy office for formal therapy.      Long Term Goal:  Pt will accept least restrictive diet tolerance w/o overt s/s aspiration.      Short Term Goals:     1. Pt will perform OROM/SHANA exercises x3 sets x10 reps w/ min cues.  * GOAL MET     2. Pt will perform compensatory techniques during meals w/ min cues.  * GOAL MET           Thank you-  Claribel Lovell M.S., CFY-SLP     Visit Dx:  No diagnosis found.  Patient Active Problem List   Diagnosis   • Colon cancer screening   • Gastritis   • CVA (cerebral vascular accident) (CMS/HCC)     Past Medical History:   Diagnosis Date   • Diabetes mellitus (CMS/HCC)    • Diarrhea    • Full dentures     INSTRUCTED NO ADHESIVES THE DOS. REPORTS USUALLY ONLY WEARS UPPER PLATE.   • Hearing loss     REPORTS MORE LOSS ON LEFT SIDE BUT THAT HE HAS BILATERAL LOSS. NO USE OF HEARING AIDS.   • Hepatitis     REPORTS AS A CHILD.  UNSURE IF TYPE A OR B.   • History of acute pancreatitis     REPORTS PRIOR TO 2000   • History of gout    • Hypertension    •  walker and 1 person    Sit to stand: contact guard/touching/steadying assist    Stand to sit: contact guard/touching/steadying assist    Stand pivot: contact guard/touching/steadying assist  Training completed:    Tasks: sit to stand, stand to sit and stand pivot    Education details: body mechanics and patient safety    Pt transfers with 2ww, good use of hands, mostly steady throughout, slight sway initially with 2ww transfer from bed, self correcting, no overt LOB or buckling throughout, pt transfers from bed to recliner, contact guard overall with all transfers for 2ww positioning and safety, pt then transfers from recliner using chair arms appropriately to push off, initiating with rocking motion for momentum  Gait/Ambulation:     Assistance: contact guard/touching/steadying assist   Assistive device: 2-wheeled walker, 1 person and gait belt    Distance (ft): 4; 6  Training Completed:    Tasks: gait training on level surfaces    Education details: body mechanics and patient safety    Pt ambulating for pivot transfer with 2ww from bed to recliner, second trial from recliner forward and retro 3 steps, slow shuffling steps throughout, contact guard for safety and quality of movement      Interventions     Training provided: activity tolerance  Standing marching, lateral weight shifting, perturbations  Skilled input: Verbal instruction/cues  Verbal Consent: Writer verbally educated and received verbal consent for hand placement, positioning of patient, and techniques to be performed today from patient        ASSESSMENT    Impairments: range of motion, strength, balance deficits, safety awareness, endurance, activity tolerance, coordination and body habitus  Functional Limitations: all functional mobility     - Pt admitted for SOB, s/p fall 3x in past two days, currently complaining of decreased strength, decreased endurance, decreased balance, and family reporting of increased confusion  - See prior level of  Seasonal allergies    • Stroke (CMS/HCC)    • Wears glasses     RX GLASSES     Past Surgical History:   Procedure Laterality Date   • COLONOSCOPY N/A 11/26/2018    Procedure: COLONOSCOPY;  Surgeon: Ha Sykes MD;  Location: [facility] ENDOSCOPY;  Service: Gastroenterology   • ENDOSCOPY N/A 11/26/2018    Procedure: ESOPHAGOGASTRODUODENOSCOPY with cold forcep biopsy;  Surgeon: Ha Sykes MD;  Location: [facility] ENDOSCOPY;  Service: Gastroenterology   • TOOTH EXTRACTION        EDUCATION  The patient has been educated in the following areas:     Cognitive Impairment Communication Impairment.    Impression: Mr. Lopez continues to make progress towards his goals.     SLP Recommendation and Plan   Continue current POC to allow for maximal opportunities for improvement.     Thank you   Claribel Lovell M.S., CFY-SLP                   Time Calculation:         Therapy Charges for Today     Code Description Service Date Service Provider Modifiers Qty    04846685918 HC ST TREATMENT SPEECH 3 7/12/2021 Claribel Lovell MS, CFY-SLP GN 1    [id number] HC ST TREATMENT SPEECH 3 7/12/2021 Claribel Lovell MS, CFY-SLP GN 1                     Claribel Lovell MS, CFY-SLP  7/13/2021   function section below for more details.  - Pt confused throughout session, intermittently looking for cigarettes and mimicking smoking with imaginary cigarette in hand  - Increased effort and time for safety, setup, sequencing, task at hand, although pt agreeable and receptive throughout session  - Family reporting of pt being more confused at baseline, also reports of pt being in basement normally but when home will be living on first floor with no stairs to do  - Family not agreeable to PHILLIP at this time, then would recommend home with home therapy and 24 hour assist    Discharge Recommendations  Recommendation for Discharge: PT WI: Sub-acute nursing home, Home, Home therapy, 24 Hour assist (family declining PHILLIP, then recommending home with home therapy and 24 hour assist)         PT/OT Mobility Equipment for Discharge: TBD pending progress      PT Identified Barriers to Discharge: medical status, cognitive status     Skilled therapy is required to address these limitations in attempt to maximize the patient's independence.  Predicted patient presentation: Moderate (evolving) - Patient comorbidities and complexities, as defined above, may have varying impact on steady progress for prescribed plan of care.    End of Session:   Location: in chair  Safety measures: alarm system in place/re-engaged, call light within reach and lines intact  Handoff to: nurse    PLAN    Suggestions for next session as indicated: Progress bed mobility, transfers, ambulation distance, use of 2ww, safety education  PT Frequency: Once a day, 6 days/week  Frequency Comments: 1/6 by 7/10.      Interventions: balance, body mechanics, compensatory technique education, energy conservation, gait training, neuromuscular re-education, ROM, strengthening, bed mobility, endurance training, functional transfer training, stairs retraining and safety education PRIOR LIVING SITUATION:  Information Provided By:: patient, family (07/03/21 1009)  Type of  Home: House (07/03/21 1006)  Home Layout: One level;Laundry in basement (bedroom in basement) (07/03/21 1006)  # Steps to Enter: 3 (07/03/21 1006)  # Steps in the Home: 12 (07/03/21 1006)  Number of Rails: 1 (07/03/21 1006)  Lives With: Spouse;Son (07/03/21 1006)  Receives Help From: Family (07/03/21 1006)  Transportation: Spouse (07/03/21 1006)  Bathroom Shower/Tub: Tub/Shower unit (07/03/21 1006)  Bathroom Toilet: Raised (07/03/21 1006)  Bathroom Equipment: Grab bars in shower;Shower chair (07/03/21 1006)  Bathroom Accessibility: Entry Level (07/03/21 1006)  Home Equipment: Cane;Four-wheeled walker (07/03/21 1006)  Additional Comments: Normally no AD, uses cane and 4ww as needed, 4ww for outside mobility, no oxygen at home (07/03/21 1006)    PRIOR LEVEL OF FUNCTION:  Grooming: Total Assist (Total) (07/03/21 1006)  Bathing: Moderate Assist (Mod) (07/03/21 1006)  Upper Body Dressing: Maximal Assist (Max) (07/03/21 1006)  Lower Body Dressing: Maximal Assist (Max) (07/03/21 1006)  Toileting: Maximal Assist (Max) (07/03/21 1006)  Bed Mobility: Supervision (Supv) (07/03/21 1006)  Transfers: Modified Independent (07/03/21 1006)  Toilet Transfers: Modified Independent (07/03/21 1006)  Ambulation in the Home: Modified  Independent (07/03/21 1006)  Ambulation in the Community: Modified Independent (07/03/21 1006)  Steps into the Home: Modified Independent (07/03/21 1006)  Steps within the Home: Modified Independent (07/03/21 1006)  History of Falls in past year: Yes (tripping, legs giving out, weakness) (07/03/21 1006)  Number of falls in past year: 6 (07/03/21 1006)  Any fall with injury?: No (07/03/21 1006)  Prior Homemaking/IADLs: Needs assistance (07/03/21 1006)      Agreement to plan and goals: patient agrees with goals and treatment plan        GOALS  Review Date: 7/10/2021  Long Term Goals: (to be met by time of discharge from hospital)  Sit to supine: Patient will complete sit to supine independent.  Supine to sit:  Patient will complete supine to sit independent.  Sit to stand: Patient will complete sit to stand transfer with gait belt and 2-wheeled walker, modified independent.   Stand to sit: Patient will complete stand to sit transfer with gait belt and 2-wheeled walker, modified independent.   Ambulation (even): Patient will ambulate on even surface for 300 feet with gait belt and 2-wheeled walker, modified independent.     Documented in the chart in the following areas: Prior Level of Function. Pain. Assessment.      Therapy procedure time and total treatment time can be found documented on the Time Entry flowsheet   96

## 2023-02-08 ENCOUNTER — LAB REQUISITION (OUTPATIENT)
Dept: LAB | Facility: HOSPITAL | Age: 68
End: 2023-02-08
Payer: MEDICARE

## 2023-02-08 DIAGNOSIS — I10 ESSENTIAL (PRIMARY) HYPERTENSION: ICD-10-CM

## 2023-02-08 LAB — POTASSIUM SERPL-SCNC: 5.6 MMOL/L (ref 3.5–5.2)

## 2023-02-08 PROCEDURE — 84132 ASSAY OF SERUM POTASSIUM: CPT | Performed by: INTERNAL MEDICINE

## (undated) DEVICE — KT ORCA VLV SXN AIR/H2O W/SEAL 1P/U STRL

## (undated) DEVICE — Device

## (undated) DEVICE — MEDI-VAC NON-CONDUCTIVE SUCTION TUBING: Brand: CARDINAL HEALTH

## (undated) DEVICE — SYR LUER SLPTP 50ML

## (undated) DEVICE — ENDOGATOR AUXILIARY WATER JET CONNECTOR: Brand: ENDOGATOR

## (undated) DEVICE — JELLY,LUBE,STERILE,FLIP TOP,TUBE,2-OZ: Brand: MEDLINE

## (undated) DEVICE — TP SXN YANKR BULB STRL

## (undated) DEVICE — FRCP BIOP COLD ENDOJAW ALLGTR W/NDL 2.8X2300MM BLU

## (undated) DEVICE — GLV SURG TRIUMPH MICRO PF LTX 7.5 STRL

## (undated) DEVICE — PAD GRND REM POLYHESIVE A/ DISP

## (undated) DEVICE — GLV SURG SENSICARE W/ALOE PF LF 7.5 STRL

## (undated) DEVICE — CONMED SCOPE SAVER BITE BLOCK, 20X27 MM: Brand: SCOPE SAVER